# Patient Record
Sex: FEMALE | Race: WHITE | HISPANIC OR LATINO | URBAN - METROPOLITAN AREA
[De-identification: names, ages, dates, MRNs, and addresses within clinical notes are randomized per-mention and may not be internally consistent; named-entity substitution may affect disease eponyms.]

---

## 2017-01-15 ENCOUNTER — EMERGENCY (EMERGENCY)
Facility: HOSPITAL | Age: 32
LOS: 1 days | Discharge: HOME | End: 2017-01-15
Admitting: EMERGENCY MEDICINE
Payer: COMMERCIAL

## 2017-01-15 DIAGNOSIS — Z91.040 LATEX ALLERGY STATUS: ICD-10-CM

## 2017-01-15 DIAGNOSIS — R30.0 DYSURIA: ICD-10-CM

## 2017-01-15 DIAGNOSIS — N39.0 URINARY TRACT INFECTION, SITE NOT SPECIFIED: ICD-10-CM

## 2017-01-15 PROCEDURE — 99283 EMERGENCY DEPT VISIT LOW MDM: CPT

## 2017-01-15 PROCEDURE — 99283 EMERGENCY DEPT VISIT LOW MDM: CPT | Mod: 25

## 2017-01-15 PROCEDURE — 81025 URINE PREGNANCY TEST: CPT

## 2017-01-15 PROCEDURE — 81003 URINALYSIS AUTO W/O SCOPE: CPT

## 2017-01-15 PROCEDURE — 87491 CHLMYD TRACH DNA AMP PROBE: CPT

## 2017-01-15 PROCEDURE — 87591 N.GONORRHOEAE DNA AMP PROB: CPT

## 2017-01-15 PROCEDURE — 87086 URINE CULTURE/COLONY COUNT: CPT

## 2017-05-28 ENCOUNTER — EMERGENCY (EMERGENCY)
Facility: HOSPITAL | Age: 32
LOS: 1 days | Discharge: HOME | End: 2017-05-28
Attending: EMERGENCY MEDICINE | Admitting: EMERGENCY MEDICINE
Payer: COMMERCIAL

## 2017-05-28 VITALS
SYSTOLIC BLOOD PRESSURE: 104 MMHG | OXYGEN SATURATION: 97 % | TEMPERATURE: 99 F | DIASTOLIC BLOOD PRESSURE: 70 MMHG | RESPIRATION RATE: 14 BRPM | HEART RATE: 66 BPM

## 2017-05-28 VITALS
WEIGHT: 104.94 LBS | TEMPERATURE: 98 F | SYSTOLIC BLOOD PRESSURE: 124 MMHG | OXYGEN SATURATION: 98 % | HEIGHT: 66 IN | DIASTOLIC BLOOD PRESSURE: 84 MMHG | HEART RATE: 88 BPM | RESPIRATION RATE: 15 BRPM

## 2017-05-28 DIAGNOSIS — N30.00 ACUTE CYSTITIS WITHOUT HEMATURIA: ICD-10-CM

## 2017-05-28 DIAGNOSIS — Z91.040 LATEX ALLERGY STATUS: ICD-10-CM

## 2017-05-28 DIAGNOSIS — K52.9 NONINFECTIVE GASTROENTERITIS AND COLITIS, UNSPECIFIED: ICD-10-CM

## 2017-05-28 LAB
ALBUMIN SERPL ELPH-MCNC: 3.9 G/DL — SIGNIFICANT CHANGE UP (ref 3.3–5)
ALP SERPL-CCNC: 61 U/L — SIGNIFICANT CHANGE UP (ref 40–120)
ALT FLD-CCNC: 17 U/L — SIGNIFICANT CHANGE UP (ref 12–78)
ANION GAP SERPL CALC-SCNC: 10 MMOL/L — SIGNIFICANT CHANGE UP (ref 5–17)
APPEARANCE UR: CLEAR — SIGNIFICANT CHANGE UP
AST SERPL-CCNC: 20 U/L — SIGNIFICANT CHANGE UP (ref 15–37)
BILIRUB SERPL-MCNC: 0.3 MG/DL — SIGNIFICANT CHANGE UP (ref 0.2–1.2)
BILIRUB UR-MCNC: NEGATIVE — SIGNIFICANT CHANGE UP
BUN SERPL-MCNC: 6 MG/DL — LOW (ref 7–23)
CALCIUM SERPL-MCNC: 8.8 MG/DL — SIGNIFICANT CHANGE UP (ref 8.5–10.1)
CHLORIDE SERPL-SCNC: 109 MMOL/L — HIGH (ref 96–108)
CO2 SERPL-SCNC: 21 MMOL/L — LOW (ref 22–31)
COLOR SPEC: YELLOW — SIGNIFICANT CHANGE UP
CREAT SERPL-MCNC: 0.55 MG/DL — SIGNIFICANT CHANGE UP (ref 0.5–1.3)
DIFF PNL FLD: NEGATIVE — SIGNIFICANT CHANGE UP
GLUCOSE SERPL-MCNC: 116 MG/DL — HIGH (ref 70–99)
GLUCOSE UR QL: NEGATIVE — SIGNIFICANT CHANGE UP
HCG SERPL-ACNC: <1 MIU/ML — SIGNIFICANT CHANGE UP
HCT VFR BLD CALC: 43.3 % — SIGNIFICANT CHANGE UP (ref 34.5–45)
HGB BLD-MCNC: 14.3 G/DL — SIGNIFICANT CHANGE UP (ref 11.5–15.5)
KETONES UR-MCNC: ABNORMAL
LACTATE SERPL-SCNC: 2.9 MMOL/L — HIGH (ref 0.7–2)
LEUKOCYTE ESTERASE UR-ACNC: NEGATIVE — SIGNIFICANT CHANGE UP
LIDOCAIN IGE QN: 135 U/L — SIGNIFICANT CHANGE UP (ref 73–393)
LYMPHOCYTES # BLD AUTO: 20 % — SIGNIFICANT CHANGE UP (ref 13–44)
MCHC RBC-ENTMCNC: 31.3 PG — SIGNIFICANT CHANGE UP (ref 27–34)
MCHC RBC-ENTMCNC: 33 GM/DL — SIGNIFICANT CHANGE UP (ref 32–36)
MCV RBC AUTO: 94.8 FL — SIGNIFICANT CHANGE UP (ref 80–100)
MONOCYTES NFR BLD AUTO: 4 % — SIGNIFICANT CHANGE UP (ref 1–9)
NEUTROPHILS NFR BLD AUTO: 74 % — SIGNIFICANT CHANGE UP (ref 43–77)
NITRITE UR-MCNC: POSITIVE
PH UR: 9 — HIGH (ref 5–8)
PLATELET # BLD AUTO: 302 K/UL — SIGNIFICANT CHANGE UP (ref 150–400)
POTASSIUM SERPL-MCNC: 4.6 MMOL/L — SIGNIFICANT CHANGE UP (ref 3.5–5.3)
POTASSIUM SERPL-SCNC: 4.6 MMOL/L — SIGNIFICANT CHANGE UP (ref 3.5–5.3)
PROT SERPL-MCNC: 7.4 G/DL — SIGNIFICANT CHANGE UP (ref 6–8.3)
PROT UR-MCNC: NEGATIVE — SIGNIFICANT CHANGE UP
RBC # BLD: 4.57 M/UL — SIGNIFICANT CHANGE UP (ref 3.8–5.2)
RBC # FLD: 12.5 % — SIGNIFICANT CHANGE UP (ref 10.3–14.5)
SODIUM SERPL-SCNC: 140 MMOL/L — SIGNIFICANT CHANGE UP (ref 135–145)
SP GR SPEC: 1.01 — SIGNIFICANT CHANGE UP (ref 1.01–1.02)
UROBILINOGEN FLD QL: NEGATIVE — SIGNIFICANT CHANGE UP
WBC # BLD: 8.2 K/UL — SIGNIFICANT CHANGE UP (ref 3.8–10.5)
WBC # FLD AUTO: 8.2 K/UL — SIGNIFICANT CHANGE UP (ref 3.8–10.5)

## 2017-05-28 PROCEDURE — 74177 CT ABD & PELVIS W/CONTRAST: CPT

## 2017-05-28 PROCEDURE — 81001 URINALYSIS AUTO W/SCOPE: CPT

## 2017-05-28 PROCEDURE — 83605 ASSAY OF LACTIC ACID: CPT

## 2017-05-28 PROCEDURE — 96375 TX/PRO/DX INJ NEW DRUG ADDON: CPT

## 2017-05-28 PROCEDURE — 80307 DRUG TEST PRSMV CHEM ANLYZR: CPT

## 2017-05-28 PROCEDURE — 96376 TX/PRO/DX INJ SAME DRUG ADON: CPT

## 2017-05-28 PROCEDURE — 99284 EMERGENCY DEPT VISIT MOD MDM: CPT | Mod: 25

## 2017-05-28 PROCEDURE — 96361 HYDRATE IV INFUSION ADD-ON: CPT

## 2017-05-28 PROCEDURE — 96365 THER/PROPH/DIAG IV INF INIT: CPT

## 2017-05-28 PROCEDURE — 74177 CT ABD & PELVIS W/CONTRAST: CPT | Mod: 26

## 2017-05-28 PROCEDURE — 80053 COMPREHEN METABOLIC PANEL: CPT

## 2017-05-28 PROCEDURE — 84702 CHORIONIC GONADOTROPIN TEST: CPT

## 2017-05-28 PROCEDURE — 83690 ASSAY OF LIPASE: CPT

## 2017-05-28 PROCEDURE — 85027 COMPLETE CBC AUTOMATED: CPT

## 2017-05-28 PROCEDURE — 99285 EMERGENCY DEPT VISIT HI MDM: CPT

## 2017-05-28 RX ORDER — ONDANSETRON 8 MG/1
4 TABLET, FILM COATED ORAL ONCE
Qty: 0 | Refills: 0 | Status: COMPLETED | OUTPATIENT
Start: 2017-05-28 | End: 2017-05-28

## 2017-05-28 RX ORDER — CIPROFLOXACIN LACTATE 400MG/40ML
400 VIAL (ML) INTRAVENOUS EVERY 12 HOURS
Qty: 0 | Refills: 0 | Status: DISCONTINUED | OUTPATIENT
Start: 2017-05-28 | End: 2017-06-01

## 2017-05-28 RX ORDER — OXYCODONE HYDROCHLORIDE 5 MG/1
1 TABLET ORAL
Qty: 20 | Refills: 0 | OUTPATIENT
Start: 2017-05-28 | End: 2017-06-02

## 2017-05-28 RX ORDER — SODIUM CHLORIDE 9 MG/ML
1000 INJECTION INTRAMUSCULAR; INTRAVENOUS; SUBCUTANEOUS ONCE
Qty: 0 | Refills: 0 | Status: COMPLETED | OUTPATIENT
Start: 2017-05-28 | End: 2017-05-28

## 2017-05-28 RX ORDER — MOXIFLOXACIN HYDROCHLORIDE TABLETS, 400 MG 400 MG/1
1 TABLET, FILM COATED ORAL
Qty: 20 | Refills: 0 | OUTPATIENT
Start: 2017-05-28 | End: 2017-06-07

## 2017-05-28 RX ORDER — MORPHINE SULFATE 50 MG/1
4 CAPSULE, EXTENDED RELEASE ORAL ONCE
Qty: 0 | Refills: 0 | Status: DISCONTINUED | OUTPATIENT
Start: 2017-05-28 | End: 2017-05-28

## 2017-05-28 RX ORDER — FAMOTIDINE 10 MG/ML
20 INJECTION INTRAVENOUS ONCE
Qty: 0 | Refills: 0 | Status: COMPLETED | OUTPATIENT
Start: 2017-05-28 | End: 2017-05-28

## 2017-05-28 RX ORDER — METRONIDAZOLE 500 MG
1 TABLET ORAL
Qty: 30 | Refills: 0 | OUTPATIENT
Start: 2017-05-28 | End: 2017-06-07

## 2017-05-28 RX ORDER — METRONIDAZOLE 500 MG
500 TABLET ORAL ONCE
Qty: 0 | Refills: 0 | Status: COMPLETED | OUTPATIENT
Start: 2017-05-28 | End: 2017-05-28

## 2017-05-28 RX ORDER — METOCLOPRAMIDE HCL 10 MG
10 TABLET ORAL ONCE
Qty: 0 | Refills: 0 | Status: COMPLETED | OUTPATIENT
Start: 2017-05-28 | End: 2017-05-28

## 2017-05-28 RX ORDER — KETOROLAC TROMETHAMINE 30 MG/ML
30 SYRINGE (ML) INJECTION ONCE
Qty: 0 | Refills: 0 | Status: DISCONTINUED | OUTPATIENT
Start: 2017-05-28 | End: 2017-05-28

## 2017-05-28 RX ADMIN — Medication 100 MILLIGRAM(S): at 16:21

## 2017-05-28 RX ADMIN — ONDANSETRON 4 MILLIGRAM(S): 8 TABLET, FILM COATED ORAL at 15:39

## 2017-05-28 RX ADMIN — Medication 30 MILLIGRAM(S): at 16:21

## 2017-05-28 RX ADMIN — FAMOTIDINE 20 MILLIGRAM(S): 10 INJECTION INTRAVENOUS at 14:08

## 2017-05-28 RX ADMIN — ONDANSETRON 4 MILLIGRAM(S): 8 TABLET, FILM COATED ORAL at 13:23

## 2017-05-28 RX ADMIN — MORPHINE SULFATE 4 MILLIGRAM(S): 50 CAPSULE, EXTENDED RELEASE ORAL at 15:37

## 2017-05-28 RX ADMIN — SODIUM CHLORIDE 2000 MILLILITER(S): 9 INJECTION INTRAMUSCULAR; INTRAVENOUS; SUBCUTANEOUS at 14:00

## 2017-05-28 RX ADMIN — SODIUM CHLORIDE 2000 MILLILITER(S): 9 INJECTION INTRAMUSCULAR; INTRAVENOUS; SUBCUTANEOUS at 13:23

## 2017-05-28 RX ADMIN — ONDANSETRON 4 MILLIGRAM(S): 8 TABLET, FILM COATED ORAL at 14:32

## 2017-05-28 RX ADMIN — MORPHINE SULFATE 4 MILLIGRAM(S): 50 CAPSULE, EXTENDED RELEASE ORAL at 16:21

## 2017-05-28 RX ADMIN — Medication 200 MILLIGRAM(S): at 16:19

## 2017-05-28 RX ADMIN — Medication 30 MILLIGRAM(S): at 16:19

## 2017-05-28 NOTE — ED PROVIDER NOTE - OBJECTIVE STATEMENT
32 y/o F with hx of gastritis with c/o diffuse abdominal pain associated with nausea dn vomiting since this morning.  pt took Zofran at home with no relief of symptoms.  Pt is a poor historian and uncooperative with exam and history.  pt denies fevers, chills, diarrhea, constipations, recent travel or sick contacts.

## 2017-05-28 NOTE — ED PROVIDER NOTE - CONSTITUTIONAL, MLM
normal... well nourished, awake, alert, oriented to person, place, time/situation and in moderate apparent distress.

## 2017-05-28 NOTE — ED PROVIDER NOTE - MEDICAL DECISION MAKING DETAILS
pt with hx of gastritis with diffuse abd tenderness, burning and vomiting.  labs, UA, IVFs, pain control

## 2017-07-09 ENCOUNTER — EMERGENCY (EMERGENCY)
Facility: HOSPITAL | Age: 32
LOS: 1 days | Discharge: HOME | End: 2017-07-09
Attending: EMERGENCY MEDICINE | Admitting: EMERGENCY MEDICINE
Payer: COMMERCIAL

## 2017-07-09 VITALS
HEART RATE: 78 BPM | SYSTOLIC BLOOD PRESSURE: 102 MMHG | RESPIRATION RATE: 14 BRPM | DIASTOLIC BLOOD PRESSURE: 60 MMHG | TEMPERATURE: 98 F | OXYGEN SATURATION: 98 %

## 2017-07-09 VITALS
HEIGHT: 66 IN | TEMPERATURE: 98 F | SYSTOLIC BLOOD PRESSURE: 91 MMHG | WEIGHT: 110.01 LBS | DIASTOLIC BLOOD PRESSURE: 66 MMHG | OXYGEN SATURATION: 100 % | RESPIRATION RATE: 15 BRPM | HEART RATE: 81 BPM

## 2017-07-09 DIAGNOSIS — Z98.891 HISTORY OF UTERINE SCAR FROM PREVIOUS SURGERY: Chronic | ICD-10-CM

## 2017-07-09 DIAGNOSIS — Z87.442 PERSONAL HISTORY OF URINARY CALCULI: ICD-10-CM

## 2017-07-09 DIAGNOSIS — R11.2 NAUSEA WITH VOMITING, UNSPECIFIED: ICD-10-CM

## 2017-07-09 DIAGNOSIS — Z91.040 LATEX ALLERGY STATUS: ICD-10-CM

## 2017-07-09 DIAGNOSIS — Z98.890 OTHER SPECIFIED POSTPROCEDURAL STATES: ICD-10-CM

## 2017-07-09 DIAGNOSIS — N94.6 DYSMENORRHEA, UNSPECIFIED: ICD-10-CM

## 2017-07-09 LAB
ALBUMIN SERPL ELPH-MCNC: 4.1 G/DL — SIGNIFICANT CHANGE UP (ref 3.3–5)
ALP SERPL-CCNC: 74 U/L — SIGNIFICANT CHANGE UP (ref 40–120)
ALT FLD-CCNC: 19 U/L — SIGNIFICANT CHANGE UP (ref 12–78)
AMYLASE P1 CFR SERPL: 114 U/L — SIGNIFICANT CHANGE UP (ref 25–115)
ANION GAP SERPL CALC-SCNC: 5 MMOL/L — SIGNIFICANT CHANGE UP (ref 5–17)
APPEARANCE UR: ABNORMAL
AST SERPL-CCNC: 16 U/L — SIGNIFICANT CHANGE UP (ref 15–37)
BILIRUB SERPL-MCNC: 0.4 MG/DL — SIGNIFICANT CHANGE UP (ref 0.2–1.2)
BILIRUB UR-MCNC: NEGATIVE — SIGNIFICANT CHANGE UP
BUN SERPL-MCNC: 7 MG/DL — SIGNIFICANT CHANGE UP (ref 7–23)
CALCIUM SERPL-MCNC: 9 MG/DL — SIGNIFICANT CHANGE UP (ref 8.5–10.1)
CHLORIDE SERPL-SCNC: 109 MMOL/L — HIGH (ref 96–108)
CO2 SERPL-SCNC: 29 MMOL/L — SIGNIFICANT CHANGE UP (ref 22–31)
COLOR SPEC: YELLOW — SIGNIFICANT CHANGE UP
CREAT SERPL-MCNC: 0.66 MG/DL — SIGNIFICANT CHANGE UP (ref 0.5–1.3)
DIFF PNL FLD: ABNORMAL
GLUCOSE SERPL-MCNC: 92 MG/DL — SIGNIFICANT CHANGE UP (ref 70–99)
GLUCOSE UR QL: NEGATIVE — SIGNIFICANT CHANGE UP
HCT VFR BLD CALC: 44.9 % — SIGNIFICANT CHANGE UP (ref 34.5–45)
HGB BLD-MCNC: 14.6 G/DL — SIGNIFICANT CHANGE UP (ref 11.5–15.5)
KETONES UR-MCNC: NEGATIVE — SIGNIFICANT CHANGE UP
LEUKOCYTE ESTERASE UR-ACNC: NEGATIVE — SIGNIFICANT CHANGE UP
LIDOCAIN IGE QN: 163 U/L — SIGNIFICANT CHANGE UP (ref 73–393)
MCHC RBC-ENTMCNC: 31.2 PG — SIGNIFICANT CHANGE UP (ref 27–34)
MCHC RBC-ENTMCNC: 32.6 GM/DL — SIGNIFICANT CHANGE UP (ref 32–36)
MCV RBC AUTO: 95.7 FL — SIGNIFICANT CHANGE UP (ref 80–100)
NITRITE UR-MCNC: NEGATIVE — SIGNIFICANT CHANGE UP
PH UR: 7 — SIGNIFICANT CHANGE UP (ref 5–8)
PLATELET # BLD AUTO: 404 K/UL — HIGH (ref 150–400)
POTASSIUM SERPL-MCNC: 4.2 MMOL/L — SIGNIFICANT CHANGE UP (ref 3.5–5.3)
POTASSIUM SERPL-SCNC: 4.2 MMOL/L — SIGNIFICANT CHANGE UP (ref 3.5–5.3)
PROT SERPL-MCNC: 8 G/DL — SIGNIFICANT CHANGE UP (ref 6–8.3)
PROT UR-MCNC: NEGATIVE — SIGNIFICANT CHANGE UP
RBC # BLD: 4.69 M/UL — SIGNIFICANT CHANGE UP (ref 3.8–5.2)
RBC # FLD: 12.7 % — SIGNIFICANT CHANGE UP (ref 10.3–14.5)
SODIUM SERPL-SCNC: 143 MMOL/L — SIGNIFICANT CHANGE UP (ref 135–145)
SP GR SPEC: 1 — LOW (ref 1.01–1.02)
UROBILINOGEN FLD QL: NEGATIVE — SIGNIFICANT CHANGE UP
WBC # BLD: 13.8 K/UL — HIGH (ref 3.8–10.5)
WBC # FLD AUTO: 13.8 K/UL — HIGH (ref 3.8–10.5)

## 2017-07-09 PROCEDURE — 80053 COMPREHEN METABOLIC PANEL: CPT

## 2017-07-09 PROCEDURE — 96374 THER/PROPH/DIAG INJ IV PUSH: CPT

## 2017-07-09 PROCEDURE — 99284 EMERGENCY DEPT VISIT MOD MDM: CPT | Mod: 25

## 2017-07-09 PROCEDURE — 83690 ASSAY OF LIPASE: CPT

## 2017-07-09 PROCEDURE — 84702 CHORIONIC GONADOTROPIN TEST: CPT

## 2017-07-09 PROCEDURE — 85027 COMPLETE CBC AUTOMATED: CPT

## 2017-07-09 PROCEDURE — 82150 ASSAY OF AMYLASE: CPT

## 2017-07-09 PROCEDURE — 99285 EMERGENCY DEPT VISIT HI MDM: CPT

## 2017-07-09 PROCEDURE — 96375 TX/PRO/DX INJ NEW DRUG ADDON: CPT

## 2017-07-09 PROCEDURE — 81001 URINALYSIS AUTO W/SCOPE: CPT

## 2017-07-09 PROCEDURE — 87086 URINE CULTURE/COLONY COUNT: CPT

## 2017-07-09 RX ORDER — KETOROLAC TROMETHAMINE 30 MG/ML
30 SYRINGE (ML) INJECTION ONCE
Qty: 0 | Refills: 0 | Status: DISCONTINUED | OUTPATIENT
Start: 2017-07-09 | End: 2017-07-09

## 2017-07-09 RX ORDER — ONDANSETRON 8 MG/1
4 TABLET, FILM COATED ORAL ONCE
Qty: 0 | Refills: 0 | Status: COMPLETED | OUTPATIENT
Start: 2017-07-09 | End: 2017-07-09

## 2017-07-09 RX ORDER — SODIUM CHLORIDE 9 MG/ML
1000 INJECTION INTRAMUSCULAR; INTRAVENOUS; SUBCUTANEOUS ONCE
Qty: 0 | Refills: 0 | Status: COMPLETED | OUTPATIENT
Start: 2017-07-09 | End: 2017-07-09

## 2017-07-09 RX ORDER — FAMOTIDINE 10 MG/ML
20 INJECTION INTRAVENOUS ONCE
Qty: 0 | Refills: 0 | Status: COMPLETED | OUTPATIENT
Start: 2017-07-09 | End: 2017-07-09

## 2017-07-09 RX ADMIN — Medication 30 MILLIGRAM(S): at 16:08

## 2017-07-09 RX ADMIN — Medication 30 MILLIGRAM(S): at 16:27

## 2017-07-09 RX ADMIN — ONDANSETRON 4 MILLIGRAM(S): 8 TABLET, FILM COATED ORAL at 14:19

## 2017-07-09 RX ADMIN — FAMOTIDINE 20 MILLIGRAM(S): 10 INJECTION INTRAVENOUS at 14:18

## 2017-07-09 RX ADMIN — SODIUM CHLORIDE 2000 MILLILITER(S): 9 INJECTION INTRAMUSCULAR; INTRAVENOUS; SUBCUTANEOUS at 14:19

## 2017-07-09 NOTE — ED PROVIDER NOTE - CARE PLAN
Principal Discharge DX:	Non-intractable vomiting with nausea, unspecified vomiting type  Secondary Diagnosis:	Menses painful

## 2017-07-09 NOTE — ED ADULT NURSE NOTE - NS ED NURSE DC INFO COMPLEXITY
Patient asked questions/Returned Demonstration/Simple: Patient demonstrates quick and easy understanding/Verbalized Understanding

## 2017-07-09 NOTE — ED PROVIDER NOTE - OBJECTIVE STATEMENT
30 yo female with few hours of nausea and vomiting associated with weakness now here for evaluation. Menses began today with abdominal cramps.

## 2017-07-09 NOTE — ED PROVIDER NOTE - CONSTITUTIONAL, MLM
normal... Thin female, well nourished, awake, alert, oriented to person, place, time/situation and in mild apparent distress.

## 2017-07-10 LAB
CULTURE RESULTS: SIGNIFICANT CHANGE UP
SPECIMEN SOURCE: SIGNIFICANT CHANGE UP

## 2017-08-23 ENCOUNTER — EMERGENCY (EMERGENCY)
Facility: HOSPITAL | Age: 32
LOS: 1 days | Discharge: HOME | End: 2017-08-23
Admitting: EMERGENCY MEDICINE
Payer: COMMERCIAL

## 2017-08-23 DIAGNOSIS — Z91.040 LATEX ALLERGY STATUS: ICD-10-CM

## 2017-08-23 DIAGNOSIS — S70.11XA CONTUSION OF RIGHT THIGH, INITIAL ENCOUNTER: ICD-10-CM
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
## 2017-08-23 DIAGNOSIS — Z98.891 HISTORY OF UTERINE SCAR FROM PREVIOUS SURGERY: Chronic | ICD-10-CM
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
## 2017-08-23 DIAGNOSIS — Z79.899 OTHER LONG TERM (CURRENT) DRUG THERAPY: ICD-10-CM

## 2017-08-23 DIAGNOSIS — M79.1 MYALGIA: ICD-10-CM

## 2017-08-23 PROCEDURE — 99284 EMERGENCY DEPT VISIT MOD MDM: CPT

## 2017-08-24 PROCEDURE — 81025 URINE PREGNANCY TEST: CPT

## 2017-08-24 PROCEDURE — 99283 EMERGENCY DEPT VISIT LOW MDM: CPT | Mod: 25

## 2017-08-24 PROCEDURE — 96372 THER/PROPH/DIAG INJ SC/IM: CPT

## 2017-09-21 RX ORDER — ONDANSETRON 8 MG/1
1 TABLET, FILM COATED ORAL
Qty: 0 | Refills: 0 | COMMUNITY

## 2017-11-07 ENCOUNTER — EMERGENCY (EMERGENCY)
Facility: HOSPITAL | Age: 32
LOS: 1 days | End: 2017-11-07
Admitting: EMERGENCY MEDICINE
Payer: COMMERCIAL

## 2017-11-07 DIAGNOSIS — R11.2 NAUSEA WITH VOMITING, UNSPECIFIED: ICD-10-CM

## 2017-11-07 DIAGNOSIS — Z98.891 HISTORY OF UTERINE SCAR FROM PREVIOUS SURGERY: Chronic | ICD-10-CM

## 2017-11-07 DIAGNOSIS — Z98.890 OTHER SPECIFIED POSTPROCEDURAL STATES: ICD-10-CM

## 2017-11-07 DIAGNOSIS — F17.200 NICOTINE DEPENDENCE, UNSPECIFIED, UNCOMPLICATED: ICD-10-CM

## 2017-11-07 DIAGNOSIS — Z91.040 LATEX ALLERGY STATUS: ICD-10-CM

## 2017-11-07 DIAGNOSIS — Z79.899 OTHER LONG TERM (CURRENT) DRUG THERAPY: ICD-10-CM

## 2017-11-07 DIAGNOSIS — R10.9 UNSPECIFIED ABDOMINAL PAIN: ICD-10-CM

## 2017-11-07 PROCEDURE — 96375 TX/PRO/DX INJ NEW DRUG ADDON: CPT

## 2017-11-07 PROCEDURE — 81002 URINALYSIS NONAUTO W/O SCOPE: CPT

## 2017-11-07 PROCEDURE — 99284 EMERGENCY DEPT VISIT MOD MDM: CPT | Mod: 25

## 2017-11-07 PROCEDURE — 99284 EMERGENCY DEPT VISIT MOD MDM: CPT

## 2017-11-07 PROCEDURE — 96361 HYDRATE IV INFUSION ADD-ON: CPT

## 2017-11-07 PROCEDURE — 81025 URINE PREGNANCY TEST: CPT

## 2017-11-07 PROCEDURE — 96374 THER/PROPH/DIAG INJ IV PUSH: CPT

## 2017-11-28 ENCOUNTER — EMERGENCY (EMERGENCY)
Facility: HOSPITAL | Age: 32
LOS: 1 days | Discharge: HOME | End: 2017-11-28
Admitting: EMERGENCY MEDICINE
Payer: COMMERCIAL

## 2017-11-28 DIAGNOSIS — Z98.891 HISTORY OF UTERINE SCAR FROM PREVIOUS SURGERY: Chronic | ICD-10-CM

## 2017-11-28 DIAGNOSIS — Z79.899 OTHER LONG TERM (CURRENT) DRUG THERAPY: ICD-10-CM

## 2017-11-28 DIAGNOSIS — R10.84 GENERALIZED ABDOMINAL PAIN: ICD-10-CM

## 2017-11-28 DIAGNOSIS — M54.9 DORSALGIA, UNSPECIFIED: ICD-10-CM

## 2017-11-28 DIAGNOSIS — Z91.040 LATEX ALLERGY STATUS: ICD-10-CM

## 2017-11-28 DIAGNOSIS — R30.0 DYSURIA: ICD-10-CM

## 2017-11-28 PROCEDURE — 85027 COMPLETE CBC AUTOMATED: CPT

## 2017-11-28 PROCEDURE — 80053 COMPREHEN METABOLIC PANEL: CPT

## 2017-11-28 PROCEDURE — 81002 URINALYSIS NONAUTO W/O SCOPE: CPT

## 2017-11-28 PROCEDURE — 96374 THER/PROPH/DIAG INJ IV PUSH: CPT

## 2017-11-28 PROCEDURE — 81025 URINE PREGNANCY TEST: CPT

## 2017-11-28 PROCEDURE — 96361 HYDRATE IV INFUSION ADD-ON: CPT

## 2017-11-28 PROCEDURE — 96375 TX/PRO/DX INJ NEW DRUG ADDON: CPT

## 2017-11-28 PROCEDURE — 99284 EMERGENCY DEPT VISIT MOD MDM: CPT | Mod: 25

## 2017-11-28 PROCEDURE — 83690 ASSAY OF LIPASE: CPT

## 2017-11-28 PROCEDURE — 99284 EMERGENCY DEPT VISIT MOD MDM: CPT

## 2017-11-28 PROCEDURE — 81003 URINALYSIS AUTO W/O SCOPE: CPT

## 2018-01-04 ENCOUNTER — APPOINTMENT (OUTPATIENT)
Dept: PAIN MANAGEMENT | Facility: CLINIC | Age: 33
End: 2018-01-04

## 2018-01-28 RX ADMIN — Medication 30 MILLIGRAM(S): at 21:56

## 2018-01-31 ENCOUNTER — EMERGENCY (EMERGENCY)
Facility: HOSPITAL | Age: 33
LOS: 1 days | Discharge: HOME | End: 2018-01-31
Attending: EMERGENCY MEDICINE | Admitting: EMERGENCY MEDICINE
Payer: COMMERCIAL

## 2018-01-31 VITALS
TEMPERATURE: 98 F | HEART RATE: 78 BPM | DIASTOLIC BLOOD PRESSURE: 67 MMHG | OXYGEN SATURATION: 98 % | RESPIRATION RATE: 12 BRPM | WEIGHT: 110.01 LBS | SYSTOLIC BLOOD PRESSURE: 117 MMHG | HEIGHT: 66 IN

## 2018-01-31 DIAGNOSIS — Z98.891 HISTORY OF UTERINE SCAR FROM PREVIOUS SURGERY: Chronic | ICD-10-CM

## 2018-01-31 LAB
ALBUMIN SERPL ELPH-MCNC: 3.6 G/DL — SIGNIFICANT CHANGE UP (ref 3.3–5)
ALP SERPL-CCNC: 68 U/L — SIGNIFICANT CHANGE UP (ref 40–120)
ALT FLD-CCNC: 15 U/L — SIGNIFICANT CHANGE UP (ref 12–78)
AMYLASE P1 CFR SERPL: 98 U/L — SIGNIFICANT CHANGE UP (ref 25–115)
ANION GAP SERPL CALC-SCNC: 6 MMOL/L — SIGNIFICANT CHANGE UP (ref 5–17)
APPEARANCE UR: CLEAR — SIGNIFICANT CHANGE UP
AST SERPL-CCNC: 15 U/L — SIGNIFICANT CHANGE UP (ref 15–37)
BASOPHILS # BLD AUTO: 0.1 K/UL — SIGNIFICANT CHANGE UP (ref 0–0.2)
BASOPHILS NFR BLD AUTO: 0.9 % — SIGNIFICANT CHANGE UP (ref 0–2)
BILIRUB SERPL-MCNC: 0.4 MG/DL — SIGNIFICANT CHANGE UP (ref 0.2–1.2)
BILIRUB UR-MCNC: ABNORMAL
BUN SERPL-MCNC: 16 MG/DL — SIGNIFICANT CHANGE UP (ref 7–23)
CALCIUM SERPL-MCNC: 8.4 MG/DL — LOW (ref 8.5–10.1)
CHLORIDE SERPL-SCNC: 107 MMOL/L — SIGNIFICANT CHANGE UP (ref 96–108)
CO2 SERPL-SCNC: 28 MMOL/L — SIGNIFICANT CHANGE UP (ref 22–31)
COLOR SPEC: ABNORMAL
CREAT SERPL-MCNC: 0.62 MG/DL — SIGNIFICANT CHANGE UP (ref 0.5–1.3)
DIFF PNL FLD: NEGATIVE — SIGNIFICANT CHANGE UP
EOSINOPHIL # BLD AUTO: 0.1 K/UL — SIGNIFICANT CHANGE UP (ref 0–0.5)
EOSINOPHIL NFR BLD AUTO: 0.7 % — SIGNIFICANT CHANGE UP (ref 0–6)
GLUCOSE SERPL-MCNC: 84 MG/DL — SIGNIFICANT CHANGE UP (ref 70–99)
GLUCOSE UR QL: NEGATIVE — SIGNIFICANT CHANGE UP
HCG SERPL-ACNC: <1 MIU/ML — SIGNIFICANT CHANGE UP
HCT VFR BLD CALC: 37.9 % — SIGNIFICANT CHANGE UP (ref 34.5–45)
HGB BLD-MCNC: 12.4 G/DL — SIGNIFICANT CHANGE UP (ref 11.5–15.5)
KETONES UR-MCNC: ABNORMAL
LEUKOCYTE ESTERASE UR-ACNC: ABNORMAL
LIDOCAIN IGE QN: 206 U/L — SIGNIFICANT CHANGE UP (ref 73–393)
LYMPHOCYTES # BLD AUTO: 3.4 K/UL — HIGH (ref 1–3.3)
LYMPHOCYTES # BLD AUTO: 35.6 % — SIGNIFICANT CHANGE UP (ref 13–44)
MCHC RBC-ENTMCNC: 30.1 PG — SIGNIFICANT CHANGE UP (ref 27–34)
MCHC RBC-ENTMCNC: 32.8 GM/DL — SIGNIFICANT CHANGE UP (ref 32–36)
MCV RBC AUTO: 91.9 FL — SIGNIFICANT CHANGE UP (ref 80–100)
MONOCYTES # BLD AUTO: 0.8 K/UL — SIGNIFICANT CHANGE UP (ref 0–0.9)
MONOCYTES NFR BLD AUTO: 8.3 % — SIGNIFICANT CHANGE UP (ref 1–9)
NEUTROPHILS # BLD AUTO: 5.2 K/UL — SIGNIFICANT CHANGE UP (ref 1.8–7.4)
NEUTROPHILS NFR BLD AUTO: 54.5 % — SIGNIFICANT CHANGE UP (ref 43–77)
NITRITE UR-MCNC: POSITIVE
PH UR: 7 — SIGNIFICANT CHANGE UP (ref 5–8)
PLATELET # BLD AUTO: 346 K/UL — SIGNIFICANT CHANGE UP (ref 150–400)
POTASSIUM SERPL-MCNC: 4.1 MMOL/L — SIGNIFICANT CHANGE UP (ref 3.5–5.3)
POTASSIUM SERPL-SCNC: 4.1 MMOL/L — SIGNIFICANT CHANGE UP (ref 3.5–5.3)
PROT SERPL-MCNC: 7 G/DL — SIGNIFICANT CHANGE UP (ref 6–8.3)
PROT UR-MCNC: NEGATIVE — SIGNIFICANT CHANGE UP
RBC # BLD: 4.13 M/UL — SIGNIFICANT CHANGE UP (ref 3.8–5.2)
RBC # FLD: 12.2 % — SIGNIFICANT CHANGE UP (ref 10.3–14.5)
SODIUM SERPL-SCNC: 141 MMOL/L — SIGNIFICANT CHANGE UP (ref 135–145)
SP GR SPEC: 1.01 — SIGNIFICANT CHANGE UP (ref 1.01–1.02)
UROBILINOGEN FLD QL: 12
WBC # BLD: 9.6 K/UL — SIGNIFICANT CHANGE UP (ref 3.8–10.5)
WBC # FLD AUTO: 9.6 K/UL — SIGNIFICANT CHANGE UP (ref 3.8–10.5)

## 2018-01-31 PROCEDURE — 74176 CT ABD & PELVIS W/O CONTRAST: CPT | Mod: 26

## 2018-01-31 PROCEDURE — 99284 EMERGENCY DEPT VISIT MOD MDM: CPT

## 2018-01-31 RX ORDER — SODIUM CHLORIDE 9 MG/ML
1000 INJECTION INTRAMUSCULAR; INTRAVENOUS; SUBCUTANEOUS ONCE
Qty: 0 | Refills: 0 | Status: COMPLETED | OUTPATIENT
Start: 2018-01-31 | End: 2018-01-31

## 2018-01-31 RX ORDER — SODIUM CHLORIDE 9 MG/ML
3 INJECTION INTRAMUSCULAR; INTRAVENOUS; SUBCUTANEOUS ONCE
Qty: 0 | Refills: 0 | Status: COMPLETED | OUTPATIENT
Start: 2018-01-31 | End: 2018-01-31

## 2018-01-31 RX ORDER — KETOROLAC TROMETHAMINE 30 MG/ML
30 SYRINGE (ML) INJECTION ONCE
Qty: 0 | Refills: 0 | Status: DISCONTINUED | OUTPATIENT
Start: 2018-01-31 | End: 2018-01-31

## 2018-01-31 RX ORDER — OXYCODONE AND ACETAMINOPHEN 5; 325 MG/1; MG/1
1 TABLET ORAL ONCE
Qty: 0 | Refills: 0 | Status: DISCONTINUED | OUTPATIENT
Start: 2018-01-31 | End: 2018-01-31

## 2018-01-31 RX ORDER — ONDANSETRON 8 MG/1
4 TABLET, FILM COATED ORAL ONCE
Qty: 0 | Refills: 0 | Status: COMPLETED | OUTPATIENT
Start: 2018-01-31 | End: 2018-01-31

## 2018-01-31 RX ADMIN — SODIUM CHLORIDE 1000 MILLILITER(S): 9 INJECTION INTRAMUSCULAR; INTRAVENOUS; SUBCUTANEOUS at 21:25

## 2018-01-31 RX ADMIN — SODIUM CHLORIDE 3 MILLILITER(S): 9 INJECTION INTRAMUSCULAR; INTRAVENOUS; SUBCUTANEOUS at 20:02

## 2018-01-31 RX ADMIN — SODIUM CHLORIDE 1000 MILLILITER(S): 9 INJECTION INTRAMUSCULAR; INTRAVENOUS; SUBCUTANEOUS at 20:05

## 2018-01-31 RX ADMIN — ONDANSETRON 4 MILLIGRAM(S): 8 TABLET, FILM COATED ORAL at 20:08

## 2018-01-31 RX ADMIN — Medication 30 MILLIGRAM(S): at 21:26

## 2018-01-31 NOTE — ED PROVIDER NOTE - PLAN OF CARE
Return to the ED for any new or worsening symptoms  Take your medication as prescribed  Ceftin 1 tab 2 times a day you had your first dose here   Motrin per label instructions as needed for pain   Pyridium per label instructions as needed for pain   Zofran per label instructions as needed for nausea   Advance activity as tolerated   Follow up with your PMD in 2-3 days for a recheck

## 2018-01-31 NOTE — ED PROVIDER NOTE - OBJECTIVE STATEMENT
Pt is a 33 yo female who presents to the ED with cc of abdominal pain, dysuria.  PMHx of gastritis, h/o kidney stone did not require surgical intervention.  Pt reports the for the last several days she has been experiencing diffuse lower abdominal pain with radiation to her back.  She further reports subjective fevers, chills, nausea, dysuria, frequency, and urgency.  Denies V/D/C, CP, SOB, ext numbness or weakness.  Pt has taken Pyridium OTC with little to no relief.  She has not followed up with her PMD regarding these symptoms.  LMP 1 1/2 weeks ago

## 2018-01-31 NOTE — ED ADULT TRIAGE NOTE - CHIEF COMPLAINT QUOTE
patient with complain of lower abdomen and lower back pains, chills, nausea, frequency and burning with urination, hematuria

## 2018-01-31 NOTE — ED PROVIDER NOTE - CARE PLAN
Principal Discharge DX:	UTI (urinary tract infection)  Assessment and plan of treatment:	Return to the ED for any new or worsening symptoms  Take your medication as prescribed  Ceftin 1 tab 2 times a day you had your first dose here   Motrin per label instructions as needed for pain   Pyridium per label instructions as needed for pain   Zofran per label instructions as needed for nausea   Advance activity as tolerated   Follow up with your PMD in 2-3 days for a recheck  Secondary Diagnosis:	Abdominal pain  Secondary Diagnosis:	Nausea

## 2018-02-01 VITALS
DIASTOLIC BLOOD PRESSURE: 66 MMHG | HEART RATE: 65 BPM | OXYGEN SATURATION: 100 % | TEMPERATURE: 98 F | RESPIRATION RATE: 15 BRPM | SYSTOLIC BLOOD PRESSURE: 100 MMHG

## 2018-02-01 LAB
CULTURE RESULTS: SIGNIFICANT CHANGE UP
SPECIMEN SOURCE: SIGNIFICANT CHANGE UP

## 2018-02-01 PROCEDURE — 81001 URINALYSIS AUTO W/SCOPE: CPT

## 2018-02-01 PROCEDURE — 80053 COMPREHEN METABOLIC PANEL: CPT

## 2018-02-01 PROCEDURE — 99284 EMERGENCY DEPT VISIT MOD MDM: CPT | Mod: 25

## 2018-02-01 PROCEDURE — 74176 CT ABD & PELVIS W/O CONTRAST: CPT

## 2018-02-01 PROCEDURE — 83690 ASSAY OF LIPASE: CPT

## 2018-02-01 PROCEDURE — 84702 CHORIONIC GONADOTROPIN TEST: CPT

## 2018-02-01 PROCEDURE — 87086 URINE CULTURE/COLONY COUNT: CPT

## 2018-02-01 PROCEDURE — 96374 THER/PROPH/DIAG INJ IV PUSH: CPT

## 2018-02-01 PROCEDURE — 96375 TX/PRO/DX INJ NEW DRUG ADDON: CPT

## 2018-02-01 PROCEDURE — 85027 COMPLETE CBC AUTOMATED: CPT

## 2018-02-01 PROCEDURE — 82150 ASSAY OF AMYLASE: CPT

## 2018-02-01 RX ORDER — CEFUROXIME AXETIL 250 MG
500 TABLET ORAL ONCE
Qty: 0 | Refills: 0 | Status: COMPLETED | OUTPATIENT
Start: 2018-02-01 | End: 2018-02-01

## 2018-02-01 RX ORDER — PHENAZOPYRIDINE HCL 100 MG
1 TABLET ORAL
Qty: 6 | Refills: 0
Start: 2018-02-01 | End: 2018-02-02

## 2018-02-01 RX ORDER — IBUPROFEN 200 MG
1 TABLET ORAL
Qty: 24 | Refills: 0
Start: 2018-02-01 | End: 2018-02-06

## 2018-02-01 RX ORDER — CEFUROXIME AXETIL 250 MG
1 TABLET ORAL
Qty: 14 | Refills: 0
Start: 2018-02-01 | End: 2018-02-07

## 2018-02-01 RX ORDER — ONDANSETRON 8 MG/1
1 TABLET, FILM COATED ORAL
Qty: 12 | Refills: 0
Start: 2018-02-01 | End: 2018-02-04

## 2018-02-01 RX ADMIN — Medication 500 MILLIGRAM(S): at 00:32

## 2018-02-01 RX ADMIN — OXYCODONE AND ACETAMINOPHEN 1 TABLET(S): 5; 325 TABLET ORAL at 00:40

## 2018-02-01 RX ADMIN — OXYCODONE AND ACETAMINOPHEN 1 TABLET(S): 5; 325 TABLET ORAL at 00:10

## 2018-05-09 ENCOUNTER — EMERGENCY (EMERGENCY)
Facility: HOSPITAL | Age: 33
LOS: 1 days | Discharge: HOME | End: 2018-05-09
Admitting: EMERGENCY MEDICINE
Payer: COMMERCIAL

## 2018-05-09 DIAGNOSIS — Z87.09 PERSONAL HISTORY OF OTHER DISEASES OF THE RESPIRATORY SYSTEM: ICD-10-CM

## 2018-05-09 DIAGNOSIS — Z79.899 OTHER LONG TERM (CURRENT) DRUG THERAPY: ICD-10-CM

## 2018-05-09 DIAGNOSIS — Z87.19 PERSONAL HISTORY OF OTHER DISEASES OF THE DIGESTIVE SYSTEM: ICD-10-CM

## 2018-05-09 DIAGNOSIS — Z98.891 HISTORY OF UTERINE SCAR FROM PREVIOUS SURGERY: Chronic | ICD-10-CM

## 2018-05-09 DIAGNOSIS — R53.81 OTHER MALAISE: ICD-10-CM

## 2018-05-09 DIAGNOSIS — R51 HEADACHE: ICD-10-CM

## 2018-05-09 DIAGNOSIS — R30.0 DYSURIA: ICD-10-CM

## 2018-05-09 DIAGNOSIS — Z91.040 LATEX ALLERGY STATUS: ICD-10-CM

## 2018-05-09 DIAGNOSIS — R10.9 UNSPECIFIED ABDOMINAL PAIN: ICD-10-CM

## 2018-05-09 PROCEDURE — 99284 EMERGENCY DEPT VISIT MOD MDM: CPT

## 2018-05-10 PROCEDURE — 96374 THER/PROPH/DIAG INJ IV PUSH: CPT

## 2018-05-10 PROCEDURE — 81003 URINALYSIS AUTO W/O SCOPE: CPT

## 2018-05-10 PROCEDURE — 87086 URINE CULTURE/COLONY COUNT: CPT

## 2018-05-10 PROCEDURE — 99284 EMERGENCY DEPT VISIT MOD MDM: CPT | Mod: 25

## 2018-05-10 PROCEDURE — 85610 PROTHROMBIN TIME: CPT

## 2018-05-10 PROCEDURE — 80053 COMPREHEN METABOLIC PANEL: CPT

## 2018-05-10 PROCEDURE — 81025 URINE PREGNANCY TEST: CPT

## 2018-05-10 PROCEDURE — 85730 THROMBOPLASTIN TIME PARTIAL: CPT

## 2018-05-10 PROCEDURE — 85027 COMPLETE CBC AUTOMATED: CPT

## 2018-06-01 ENCOUNTER — EMERGENCY (EMERGENCY)
Facility: HOSPITAL | Age: 33
LOS: 1 days | Discharge: HOME | End: 2018-06-01
Admitting: INTERNAL MEDICINE
Payer: COMMERCIAL

## 2018-06-01 DIAGNOSIS — M54.2 CERVICALGIA: ICD-10-CM

## 2018-06-01 DIAGNOSIS — Z91.040 LATEX ALLERGY STATUS: ICD-10-CM
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
## 2018-06-01 DIAGNOSIS — Z98.891 HISTORY OF UTERINE SCAR FROM PREVIOUS SURGERY: Chronic | ICD-10-CM
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
## 2018-06-01 DIAGNOSIS — S16.1XXA STRAIN OF MUSCLE, FASCIA AND TENDON AT NECK LEVEL, INITIAL ENCOUNTER: ICD-10-CM

## 2018-06-01 DIAGNOSIS — Z79.899 OTHER LONG TERM (CURRENT) DRUG THERAPY: ICD-10-CM

## 2018-06-01 PROCEDURE — 99284 EMERGENCY DEPT VISIT MOD MDM: CPT | Mod: 25

## 2018-06-01 PROCEDURE — 72040 X-RAY EXAM NECK SPINE 2-3 VW: CPT

## 2018-06-01 PROCEDURE — 99283 EMERGENCY DEPT VISIT LOW MDM: CPT

## 2018-06-01 PROCEDURE — 81025 URINE PREGNANCY TEST: CPT

## 2018-06-01 PROCEDURE — 96372 THER/PROPH/DIAG INJ SC/IM: CPT

## 2018-06-01 PROCEDURE — 72040 X-RAY EXAM NECK SPINE 2-3 VW: CPT | Mod: 26

## 2018-07-31 ENCOUNTER — EMERGENCY (EMERGENCY)
Facility: HOSPITAL | Age: 33
LOS: 1 days | Discharge: HOME | End: 2018-07-31
Attending: EMERGENCY MEDICINE | Admitting: EMERGENCY MEDICINE
Payer: MEDICAID

## 2018-07-31 VITALS
OXYGEN SATURATION: 97 % | DIASTOLIC BLOOD PRESSURE: 71 MMHG | SYSTOLIC BLOOD PRESSURE: 102 MMHG | HEIGHT: 66 IN | WEIGHT: 110.89 LBS | RESPIRATION RATE: 17 BRPM | TEMPERATURE: 98 F | HEART RATE: 78 BPM

## 2018-07-31 VITALS
OXYGEN SATURATION: 98 % | SYSTOLIC BLOOD PRESSURE: 98 MMHG | DIASTOLIC BLOOD PRESSURE: 56 MMHG | RESPIRATION RATE: 18 BRPM | HEART RATE: 73 BPM

## 2018-07-31 DIAGNOSIS — Z98.891 HISTORY OF UTERINE SCAR FROM PREVIOUS SURGERY: Chronic | ICD-10-CM

## 2018-07-31 PROBLEM — K29.70 GASTRITIS, UNSPECIFIED, WITHOUT BLEEDING: Chronic | Status: ACTIVE | Noted: 2017-05-28

## 2018-07-31 PROBLEM — N20.0 CALCULUS OF KIDNEY: Chronic | Status: ACTIVE | Noted: 2017-07-09

## 2018-07-31 LAB
ALBUMIN SERPL ELPH-MCNC: 3.5 G/DL — SIGNIFICANT CHANGE UP (ref 3.3–5)
ALP SERPL-CCNC: 62 U/L — SIGNIFICANT CHANGE UP (ref 40–120)
ALT FLD-CCNC: 20 U/L DA — SIGNIFICANT CHANGE UP (ref 10–45)
ANION GAP SERPL CALC-SCNC: 6 MMOL/L — SIGNIFICANT CHANGE UP (ref 5–17)
AST SERPL-CCNC: 52 U/L — HIGH (ref 10–40)
BASOPHILS # BLD AUTO: 0.1 K/UL — SIGNIFICANT CHANGE UP (ref 0–0.2)
BASOPHILS NFR BLD AUTO: 1 % — SIGNIFICANT CHANGE UP (ref 0–2)
BILIRUB SERPL-MCNC: 0.5 MG/DL — SIGNIFICANT CHANGE UP (ref 0.2–1.2)
BUN SERPL-MCNC: 13 MG/DL — SIGNIFICANT CHANGE UP (ref 7–23)
CALCIUM SERPL-MCNC: 8.2 MG/DL — LOW (ref 8.4–10.5)
CHLORIDE SERPL-SCNC: 110 MMOL/L — HIGH (ref 96–108)
CO2 SERPL-SCNC: 25 MMOL/L — SIGNIFICANT CHANGE UP (ref 22–31)
CREAT SERPL-MCNC: 0.64 MG/DL — SIGNIFICANT CHANGE UP (ref 0.5–1.3)
EOSINOPHIL # BLD AUTO: 0.1 K/UL — SIGNIFICANT CHANGE UP (ref 0–0.5)
EOSINOPHIL NFR BLD AUTO: 1.1 % — SIGNIFICANT CHANGE UP (ref 0–6)
GLUCOSE SERPL-MCNC: 95 MG/DL — SIGNIFICANT CHANGE UP (ref 70–99)
HCT VFR BLD CALC: 40.6 % — SIGNIFICANT CHANGE UP (ref 34.5–45)
HGB BLD-MCNC: 13 G/DL — SIGNIFICANT CHANGE UP (ref 11.5–15.5)
LIDOCAIN IGE QN: 153 U/L — SIGNIFICANT CHANGE UP (ref 73–393)
LYMPHOCYTES # BLD AUTO: 1.6 K/UL — SIGNIFICANT CHANGE UP (ref 1–3.3)
LYMPHOCYTES # BLD AUTO: 27.6 % — SIGNIFICANT CHANGE UP (ref 13–44)
MCHC RBC-ENTMCNC: 29.7 PG — SIGNIFICANT CHANGE UP (ref 27–34)
MCHC RBC-ENTMCNC: 32 GM/DL — SIGNIFICANT CHANGE UP (ref 32–36)
MCV RBC AUTO: 92.8 FL — SIGNIFICANT CHANGE UP (ref 80–100)
MONOCYTES # BLD AUTO: 0.5 K/UL — SIGNIFICANT CHANGE UP (ref 0–0.9)
MONOCYTES NFR BLD AUTO: 8.1 % — SIGNIFICANT CHANGE UP (ref 1–9)
NEUTROPHILS # BLD AUTO: 3.5 K/UL — SIGNIFICANT CHANGE UP (ref 1.8–7.4)
NEUTROPHILS NFR BLD AUTO: 62.2 % — SIGNIFICANT CHANGE UP (ref 43–77)
PLATELET # BLD AUTO: 363 K/UL — SIGNIFICANT CHANGE UP (ref 150–400)
POTASSIUM SERPL-MCNC: 5.1 MMOL/L — SIGNIFICANT CHANGE UP (ref 3.5–5.3)
POTASSIUM SERPL-SCNC: 5.1 MMOL/L — SIGNIFICANT CHANGE UP (ref 3.5–5.3)
PROT SERPL-MCNC: 7.5 G/DL — SIGNIFICANT CHANGE UP (ref 6–8.3)
RBC # BLD: 4.38 M/UL — SIGNIFICANT CHANGE UP (ref 3.8–5.2)
RBC # FLD: 13 % — SIGNIFICANT CHANGE UP (ref 10.3–14.5)
SODIUM SERPL-SCNC: 141 MMOL/L — SIGNIFICANT CHANGE UP (ref 135–145)
WBC # BLD: 5.7 K/UL — SIGNIFICANT CHANGE UP (ref 3.8–10.5)
WBC # FLD AUTO: 5.7 K/UL — SIGNIFICANT CHANGE UP (ref 3.8–10.5)

## 2018-07-31 PROCEDURE — 99284 EMERGENCY DEPT VISIT MOD MDM: CPT | Mod: 25

## 2018-07-31 PROCEDURE — 81025 URINE PREGNANCY TEST: CPT

## 2018-07-31 PROCEDURE — 80053 COMPREHEN METABOLIC PANEL: CPT

## 2018-07-31 PROCEDURE — 96375 TX/PRO/DX INJ NEW DRUG ADDON: CPT

## 2018-07-31 PROCEDURE — 85027 COMPLETE CBC AUTOMATED: CPT

## 2018-07-31 PROCEDURE — 83690 ASSAY OF LIPASE: CPT

## 2018-07-31 PROCEDURE — 99284 EMERGENCY DEPT VISIT MOD MDM: CPT

## 2018-07-31 PROCEDURE — 96374 THER/PROPH/DIAG INJ IV PUSH: CPT

## 2018-07-31 RX ORDER — SODIUM CHLORIDE 9 MG/ML
3 INJECTION INTRAMUSCULAR; INTRAVENOUS; SUBCUTANEOUS ONCE
Qty: 0 | Refills: 0 | Status: COMPLETED | OUTPATIENT
Start: 2018-07-31 | End: 2018-07-31

## 2018-07-31 RX ORDER — KETOROLAC TROMETHAMINE 30 MG/ML
30 SYRINGE (ML) INJECTION ONCE
Qty: 0 | Refills: 0 | Status: DISCONTINUED | OUTPATIENT
Start: 2018-07-31 | End: 2018-07-31

## 2018-07-31 RX ORDER — ONDANSETRON 8 MG/1
4 TABLET, FILM COATED ORAL ONCE
Qty: 0 | Refills: 0 | Status: COMPLETED | OUTPATIENT
Start: 2018-07-31 | End: 2018-07-31

## 2018-07-31 RX ORDER — SODIUM CHLORIDE 9 MG/ML
1000 INJECTION INTRAMUSCULAR; INTRAVENOUS; SUBCUTANEOUS
Qty: 0 | Refills: 0 | Status: DISCONTINUED | OUTPATIENT
Start: 2018-07-31 | End: 2018-08-04

## 2018-07-31 RX ADMIN — SODIUM CHLORIDE 125 MILLILITER(S): 9 INJECTION INTRAMUSCULAR; INTRAVENOUS; SUBCUTANEOUS at 08:40

## 2018-07-31 RX ADMIN — Medication 30 MILLIGRAM(S): at 08:41

## 2018-07-31 RX ADMIN — SODIUM CHLORIDE 3 MILLILITER(S): 9 INJECTION INTRAMUSCULAR; INTRAVENOUS; SUBCUTANEOUS at 08:41

## 2018-07-31 RX ADMIN — ONDANSETRON 4 MILLIGRAM(S): 8 TABLET, FILM COATED ORAL at 08:47

## 2018-07-31 NOTE — ED ADULT NURSE NOTE - CAS DISCH CONDITION
Improved/pt. states abdominal cramping pain improved and denies any nausea upon discharge. States "I want to go home I feel better."

## 2018-07-31 NOTE — ED ADULT NURSE NOTE - OBJECTIVE STATEMENT
pt. presents to ED complaining of nausea and lower abdominal discomfort and lower back pain, LMP 6/21/18, denies any recent travel or fever

## 2018-07-31 NOTE — ED ADULT NURSE NOTE - NSIMPLEMENTINTERV_GEN_ALL_ED
Implemented All Universal Safety Interventions:  [place] to call system. Call bell, personal items and telephone within reach. Instruct patient to call for assistance. Room bathroom lighting operational. Non-slip footwear when patient is off stretcher. Physically safe environment: no spills, clutter or unnecessary equipment. Stretcher in lowest position, wheels locked, appropriate side rails in place.

## 2018-10-17 ENCOUNTER — EMERGENCY (EMERGENCY)
Facility: HOSPITAL | Age: 33
LOS: 1 days | Discharge: HOME | End: 2018-10-17
Attending: INTERNAL MEDICINE | Admitting: INTERNAL MEDICINE
Payer: MEDICAID

## 2018-10-17 VITALS
TEMPERATURE: 98 F | HEART RATE: 70 BPM | RESPIRATION RATE: 17 BRPM | WEIGHT: 115.08 LBS | HEIGHT: 66 IN | DIASTOLIC BLOOD PRESSURE: 68 MMHG | OXYGEN SATURATION: 99 % | SYSTOLIC BLOOD PRESSURE: 104 MMHG

## 2018-10-17 DIAGNOSIS — Z98.891 HISTORY OF UTERINE SCAR FROM PREVIOUS SURGERY: Chronic | ICD-10-CM

## 2018-10-17 DIAGNOSIS — R35.0 FREQUENCY OF MICTURITION: ICD-10-CM

## 2018-10-17 LAB
APPEARANCE UR: CLEAR — SIGNIFICANT CHANGE UP
BACTERIA # UR AUTO: NEGATIVE /HPF — SIGNIFICANT CHANGE UP
BILIRUB UR-MCNC: ABNORMAL
COLOR SPEC: YELLOW — SIGNIFICANT CHANGE UP
COMMENT - URINE: SIGNIFICANT CHANGE UP
DIFF PNL FLD: NEGATIVE — SIGNIFICANT CHANGE UP
EPI CELLS # UR: SIGNIFICANT CHANGE UP
GLUCOSE UR QL: NEGATIVE — SIGNIFICANT CHANGE UP
KETONES UR-MCNC: NEGATIVE — SIGNIFICANT CHANGE UP
LEUKOCYTE ESTERASE UR-ACNC: ABNORMAL
NITRITE UR-MCNC: NEGATIVE — SIGNIFICANT CHANGE UP
PH UR: 6.5 — SIGNIFICANT CHANGE UP (ref 5–8)
PROT UR-MCNC: NEGATIVE — SIGNIFICANT CHANGE UP
RBC CASTS # UR COMP ASSIST: NEGATIVE /HPF — SIGNIFICANT CHANGE UP (ref 0–4)
SP GR SPEC: 1.01 — SIGNIFICANT CHANGE UP (ref 1.01–1.02)
UROBILINOGEN FLD QL: NEGATIVE — SIGNIFICANT CHANGE UP
WBC UR QL: SIGNIFICANT CHANGE UP /HPF (ref 0–5)

## 2018-10-17 PROCEDURE — 87086 URINE CULTURE/COLONY COUNT: CPT

## 2018-10-17 PROCEDURE — 99283 EMERGENCY DEPT VISIT LOW MDM: CPT

## 2018-10-17 PROCEDURE — 81001 URINALYSIS AUTO W/SCOPE: CPT

## 2018-10-17 PROCEDURE — 81025 URINE PREGNANCY TEST: CPT

## 2018-10-17 PROCEDURE — 87186 SC STD MICRODIL/AGAR DIL: CPT

## 2018-10-17 RX ORDER — CEFDINIR 250 MG/5ML
1 POWDER, FOR SUSPENSION ORAL
Qty: 20 | Refills: 0 | OUTPATIENT
Start: 2018-10-17 | End: 2018-10-26

## 2018-10-17 RX ORDER — L.ACIDOPH/B.ANIMALIS/B.LONGUM 15B CELL
1 CAPSULE ORAL
Qty: 14 | Refills: 0 | OUTPATIENT
Start: 2018-10-17 | End: 2018-10-30

## 2018-10-17 RX ORDER — CEFTRIAXONE 500 MG/1
1 INJECTION, POWDER, FOR SOLUTION INTRAMUSCULAR; INTRAVENOUS ONCE
Qty: 0 | Refills: 0 | Status: COMPLETED | OUTPATIENT
Start: 2018-10-17 | End: 2018-10-17

## 2018-10-17 RX ORDER — CEFDINIR 250 MG/5ML
1 POWDER, FOR SUSPENSION ORAL
Qty: 20 | Refills: 0
Start: 2018-10-17 | End: 2018-10-26

## 2018-10-17 RX ORDER — L.ACIDOPH/B.ANIMALIS/B.LONGUM 15B CELL
1 CAPSULE ORAL
Qty: 14 | Refills: 0
Start: 2018-10-17 | End: 2018-10-30

## 2018-10-17 NOTE — ED PROVIDER NOTE - PLAN OF CARE
Rest, drink plenty of fluids  Take Bactrim DS two times a day for 10 days  Take OTC Motrin or Tylenol as directed, as needed for pain  Follow up with your PMD 2-3 days  Return to the ER for worsening Rest, drink plenty of fluids  Take Omnicef 300mg two times a day for 14 days.  Take OTC Motrin or Tylenol as directed, as needed for pain  Follow up with your PMD 2-3 days  Return to the ER for worsening Rest, drink plenty of fluids  Take Omnicef 300mg two times a day for 10 days.  Take OTC Motrin or Tylenol as directed, as needed for pain  Follow up with your PMD 2-3 days  Return to the ER for worsening

## 2018-10-17 NOTE — ED PROVIDER NOTE - CARE PLAN
Principal Discharge DX:	UTI (urinary tract infection)  Assessment and plan of treatment:	Rest, drink plenty of fluids  Take Bactrim DS two times a day for 10 days  Take OTC Motrin or Tylenol as directed, as needed for pain  Follow up with your PMD 2-3 days  Return to the ER for worsening Principal Discharge DX:	UTI (urinary tract infection)  Assessment and plan of treatment:	Rest, drink plenty of fluids  Take Omnicef 300mg two times a day for 14 days.  Take OTC Motrin or Tylenol as directed, as needed for pain  Follow up with your PMD 2-3 days  Return to the ER for worsening Principal Discharge DX:	UTI (urinary tract infection)  Assessment and plan of treatment:	Rest, drink plenty of fluids  Take Omnicef 300mg two times a day for 10 days.  Take OTC Motrin or Tylenol as directed, as needed for pain  Follow up with your PMD 2-3 days  Return to the ER for worsening

## 2018-10-17 NOTE — ED PROVIDER NOTE - OBJECTIVE STATEMENT
33 year old female, PMHx of nephrolithiasis, recurrent UTIs; presents to the ED complaining of "I have a UTI" x 4 days. Patient states she has been having worsening dysuria described as burning, urinary frequency x 4 days. For the past day she has also c/o bilateral low back pain described as a dull ache "not like my kidney stone pain". She also endorses mild nausea and chills but no fevers. She denies hematuria, vaginal discharge, vomiting, diarrhea or other complaints. Pts GYN is on vacation.

## 2018-10-17 NOTE — ED ADULT NURSE NOTE - NSIMPLEMENTINTERV_GEN_ALL_ED
Implemented All Universal Safety Interventions:  [place] to call system. Call bell, personal items and telephone within reach. Instruct patient to call for assistance. Room bathroom lighting operational. Non-slip footwear when patient is off stretcher. Physically safe environment: no spills, clutter or unnecessary equipment. Stretcher in lowest position, wheels locked, appropriate side rails in place.

## 2018-10-17 NOTE — ED PROVIDER NOTE - ATTENDING CONTRIBUTION TO CARE
33 y f cc dysuria , hx of renal stones, uti  pe suprapubic, bilateral cva tenderness   rx as pyelo  Dr. Lai:  I have reviewed and discussed with the PA/ resident the case specifics, including the history, physical assessment, evaluation, conclusion, laboratory results, and medical plan. I agree with the contents, and conclusions. I have personally examined, and interviewed the patient.

## 2018-10-17 NOTE — ED ADULT NURSE NOTE - OBJECTIVE STATEMENT
Pt presents to ED with complaints of lower back pain and lower abdominal cramping that began about 5 days ago. Pt reports burning on urination.

## 2018-12-19 NOTE — ED ADULT NURSE NOTE - BREATHING, MLM
[address] [facility] [place]. [place], [address]  Tel.  [phone]  Fax. [address] [facility] [address]  [place], [address] [facility]  Tel.  [phone]  Fax. [phone] [address]  Tel.  [phone]  Fax. [phone]       S>Sally Sueanne Cabot is a 59 y.o. female seen today with a donated positive airway pressure machine for regular home use. Patient got device from her brother. (Air Sense 10 - Auto Set) She also has a new mask she got from her brother (ResMed AirFit F-20, medium). · Physician orders were reviewed and pressure was set at 5-10 cmH2O. Minimum pressure was increased from 4 cm to 5 cm for patient comfort. · Mask evaluation was performed. She was likewise informed on the use of her mask. · Patient will use the mask she has for right now, but will contact her DME for supplies. · The patient demonstrated good understanding of the positive airway pressure device. O>    There were no vitals taken for this visit. A>  1. Obstructive sleep apnea (adult) (pediatric)      AHI = 14.      P>  · Follow-up Disposition:  · Return in about 2 months (around 2/20/2019). · General information regarding operations and maintenance of the device was provided. · She was provided information on sleep apnea including coresponding risk factors and the importance of proper treatment. · Provided patient with troubleshooting and device cleaning/maintainence information. · She was advised this donated device is a one time offer and referred to other sources for additional resupply and equipment. · She was asked to contact our office for any problems regarding her PAP therapy. Spontaneous, unlabored and symmetrical

## 2018-12-20 NOTE — ED PROVIDER NOTE - DISPOSITION TYPE
I have not received any consultations from [facility] [place].  Please request DISCHARGE

## 2019-01-15 ENCOUNTER — EMERGENCY (EMERGENCY)
Facility: HOSPITAL | Age: 34
LOS: 1 days | Discharge: HOME | End: 2019-01-15
Attending: EMERGENCY MEDICINE | Admitting: EMERGENCY MEDICINE
Payer: MEDICAID

## 2019-01-15 VITALS
OXYGEN SATURATION: 99 % | HEART RATE: 76 BPM | RESPIRATION RATE: 16 BRPM | TEMPERATURE: 98 F | SYSTOLIC BLOOD PRESSURE: 93 MMHG | DIASTOLIC BLOOD PRESSURE: 54 MMHG

## 2019-01-15 VITALS
SYSTOLIC BLOOD PRESSURE: 94 MMHG | RESPIRATION RATE: 18 BRPM | DIASTOLIC BLOOD PRESSURE: 69 MMHG | HEIGHT: 66 IN | TEMPERATURE: 97 F | HEART RATE: 81 BPM | WEIGHT: 110.01 LBS | OXYGEN SATURATION: 100 %

## 2019-01-15 DIAGNOSIS — Z98.891 HISTORY OF UTERINE SCAR FROM PREVIOUS SURGERY: Chronic | ICD-10-CM

## 2019-01-15 LAB
ALBUMIN SERPL ELPH-MCNC: 3.7 G/DL — SIGNIFICANT CHANGE UP (ref 3.3–5)
ALP SERPL-CCNC: 47 U/L — SIGNIFICANT CHANGE UP (ref 40–120)
ALT FLD-CCNC: 11 U/L DA — SIGNIFICANT CHANGE UP (ref 10–45)
ANION GAP SERPL CALC-SCNC: 7 MMOL/L — SIGNIFICANT CHANGE UP (ref 5–17)
ANISOCYTOSIS BLD QL: SLIGHT — SIGNIFICANT CHANGE UP
APPEARANCE UR: CLEAR — SIGNIFICANT CHANGE UP
AST SERPL-CCNC: 24 U/L — SIGNIFICANT CHANGE UP (ref 10–40)
BACTERIA # UR AUTO: NEGATIVE /HPF — SIGNIFICANT CHANGE UP
BASOPHILS NFR BLD AUTO: 1 % — SIGNIFICANT CHANGE UP (ref 0–2)
BILIRUB SERPL-MCNC: 0.4 MG/DL — SIGNIFICANT CHANGE UP (ref 0.2–1.2)
BILIRUB UR-MCNC: ABNORMAL
BUN SERPL-MCNC: 10 MG/DL — SIGNIFICANT CHANGE UP (ref 7–23)
CALCIUM SERPL-MCNC: 9 MG/DL — SIGNIFICANT CHANGE UP (ref 8.4–10.5)
CHLORIDE SERPL-SCNC: 103 MMOL/L — SIGNIFICANT CHANGE UP (ref 96–108)
CO2 SERPL-SCNC: 25 MMOL/L — SIGNIFICANT CHANGE UP (ref 22–31)
COLOR SPEC: YELLOW — SIGNIFICANT CHANGE UP
COMMENT - URINE: SIGNIFICANT CHANGE UP
CREAT SERPL-MCNC: 0.59 MG/DL — SIGNIFICANT CHANGE UP (ref 0.5–1.3)
DIFF PNL FLD: NEGATIVE — SIGNIFICANT CHANGE UP
EPI CELLS # UR: ABNORMAL
GLUCOSE SERPL-MCNC: 89 MG/DL — SIGNIFICANT CHANGE UP (ref 70–99)
GLUCOSE UR QL: NEGATIVE — SIGNIFICANT CHANGE UP
HCG SERPL-ACNC: HIGH MIU/ML
HCT VFR BLD CALC: 42.3 % — SIGNIFICANT CHANGE UP (ref 34.5–45)
HGB BLD-MCNC: 14.1 G/DL — SIGNIFICANT CHANGE UP (ref 11.5–15.5)
KETONES UR-MCNC: NEGATIVE — SIGNIFICANT CHANGE UP
LEUKOCYTE ESTERASE UR-ACNC: NEGATIVE — SIGNIFICANT CHANGE UP
LYMPHOCYTES # BLD AUTO: 28 % — SIGNIFICANT CHANGE UP (ref 13–44)
MAGNESIUM SERPL-MCNC: 1.7 MG/DL — SIGNIFICANT CHANGE UP (ref 1.6–2.6)
MCHC RBC-ENTMCNC: 30.6 PG — SIGNIFICANT CHANGE UP (ref 27–34)
MCHC RBC-ENTMCNC: 33.2 GM/DL — SIGNIFICANT CHANGE UP (ref 32–36)
MCV RBC AUTO: 92.2 FL — SIGNIFICANT CHANGE UP (ref 80–100)
MONOCYTES NFR BLD AUTO: 4 % — SIGNIFICANT CHANGE UP (ref 2–14)
NEUTROPHILS NFR BLD AUTO: 64 % — SIGNIFICANT CHANGE UP (ref 43–77)
NITRITE UR-MCNC: NEGATIVE — SIGNIFICANT CHANGE UP
PH UR: 7 — SIGNIFICANT CHANGE UP (ref 5–8)
PLAT MORPH BLD: NORMAL — SIGNIFICANT CHANGE UP
PLATELET # BLD AUTO: 244 K/UL — SIGNIFICANT CHANGE UP (ref 150–400)
POTASSIUM SERPL-MCNC: 5 MMOL/L — SIGNIFICANT CHANGE UP (ref 3.5–5.3)
POTASSIUM SERPL-SCNC: 5 MMOL/L — SIGNIFICANT CHANGE UP (ref 3.5–5.3)
PROT SERPL-MCNC: 7.3 G/DL — SIGNIFICANT CHANGE UP (ref 6–8.3)
PROT UR-MCNC: 15
RBC # BLD: 4.59 M/UL — SIGNIFICANT CHANGE UP (ref 3.8–5.2)
RBC # FLD: 12 % — SIGNIFICANT CHANGE UP (ref 10.3–14.5)
RBC BLD AUTO: ABNORMAL
RBC CASTS # UR COMP ASSIST: SIGNIFICANT CHANGE UP /HPF (ref 0–4)
SODIUM SERPL-SCNC: 135 MMOL/L — SIGNIFICANT CHANGE UP (ref 135–145)
SP GR SPEC: 1.01 — SIGNIFICANT CHANGE UP (ref 1.01–1.02)
UROBILINOGEN FLD QL: NEGATIVE — SIGNIFICANT CHANGE UP
VARIANT LYMPHS # BLD: 3 % — SIGNIFICANT CHANGE UP (ref 0–6)
WBC # BLD: 7.3 K/UL — SIGNIFICANT CHANGE UP (ref 3.8–10.5)
WBC # FLD AUTO: 7.3 K/UL — SIGNIFICANT CHANGE UP (ref 3.8–10.5)
WBC UR QL: SIGNIFICANT CHANGE UP /HPF (ref 0–5)

## 2019-01-15 PROCEDURE — 99284 EMERGENCY DEPT VISIT MOD MDM: CPT

## 2019-01-15 PROCEDURE — 87086 URINE CULTURE/COLONY COUNT: CPT

## 2019-01-15 PROCEDURE — 99284 EMERGENCY DEPT VISIT MOD MDM: CPT | Mod: 25

## 2019-01-15 PROCEDURE — 96374 THER/PROPH/DIAG INJ IV PUSH: CPT

## 2019-01-15 PROCEDURE — 85027 COMPLETE CBC AUTOMATED: CPT

## 2019-01-15 PROCEDURE — 96375 TX/PRO/DX INJ NEW DRUG ADDON: CPT

## 2019-01-15 PROCEDURE — 80053 COMPREHEN METABOLIC PANEL: CPT

## 2019-01-15 PROCEDURE — 84702 CHORIONIC GONADOTROPIN TEST: CPT

## 2019-01-15 PROCEDURE — 81001 URINALYSIS AUTO W/SCOPE: CPT

## 2019-01-15 PROCEDURE — 83735 ASSAY OF MAGNESIUM: CPT

## 2019-01-15 RX ORDER — FAMOTIDINE 10 MG/ML
20 INJECTION INTRAVENOUS ONCE
Qty: 0 | Refills: 0 | Status: COMPLETED | OUTPATIENT
Start: 2019-01-15 | End: 2019-01-15

## 2019-01-15 RX ORDER — ONDANSETRON 8 MG/1
1 TABLET, FILM COATED ORAL
Qty: 15 | Refills: 0
Start: 2019-01-15 | End: 2019-01-19

## 2019-01-15 RX ORDER — ONDANSETRON 8 MG/1
4 TABLET, FILM COATED ORAL ONCE
Qty: 0 | Refills: 0 | Status: COMPLETED | OUTPATIENT
Start: 2019-01-15 | End: 2019-01-15

## 2019-01-15 RX ORDER — SODIUM CHLORIDE 9 MG/ML
2000 INJECTION INTRAMUSCULAR; INTRAVENOUS; SUBCUTANEOUS ONCE
Qty: 0 | Refills: 0 | Status: COMPLETED | OUTPATIENT
Start: 2019-01-15 | End: 2019-01-15

## 2019-01-15 RX ADMIN — ONDANSETRON 4 MILLIGRAM(S): 8 TABLET, FILM COATED ORAL at 10:21

## 2019-01-15 RX ADMIN — SODIUM CHLORIDE 1333.33 MILLILITER(S): 9 INJECTION INTRAMUSCULAR; INTRAVENOUS; SUBCUTANEOUS at 10:21

## 2019-01-15 RX ADMIN — FAMOTIDINE 100 MILLIGRAM(S): 10 INJECTION INTRAVENOUS at 10:21

## 2019-01-15 NOTE — ED PROVIDER NOTE - MEDICAL DECISION MAKING DETAILS
Nausea and vomiting in pregnancy. Will give Pepcid and Zofran with fluids for hydration. Check lytes.

## 2019-01-15 NOTE — ED PROVIDER NOTE - NSFOLLOWUPINSTRUCTIONS_ED_ALL_ED_FT
Return to the ER should there be worsening or concerns.   Take Zofran Dissolving tabs every 8 hours as needed for nausea or vomiting  Take your vitamins.   Follow up with your doctor this week.

## 2019-01-15 NOTE — ED PROVIDER NOTE - OBJECTIVE STATEMENT
Patient presents  7 weeks preg LMP was on Thanksgiving and she is reporting intermittent daily vomiting with nausea. She was prescribed zofran by her PCP but she states that she can't take it because taking a pill makes her nauseous. She tries to eat and drink but often times has voimting. No abd pain, No vaginal bleeding. C/O weakness and dehydration.

## 2019-01-16 LAB
CULTURE RESULTS: SIGNIFICANT CHANGE UP
SPECIMEN SOURCE: SIGNIFICANT CHANGE UP

## 2019-01-27 ENCOUNTER — EMERGENCY (EMERGENCY)
Facility: HOSPITAL | Age: 34
LOS: 1 days | Discharge: HOME | End: 2019-01-27
Attending: EMERGENCY MEDICINE | Admitting: EMERGENCY MEDICINE
Payer: MEDICAID

## 2019-01-27 VITALS
HEART RATE: 100 BPM | DIASTOLIC BLOOD PRESSURE: 85 MMHG | SYSTOLIC BLOOD PRESSURE: 116 MMHG | TEMPERATURE: 98 F | RESPIRATION RATE: 18 BRPM | OXYGEN SATURATION: 100 % | WEIGHT: 110.01 LBS | HEIGHT: 66 IN

## 2019-01-27 DIAGNOSIS — Z98.891 HISTORY OF UTERINE SCAR FROM PREVIOUS SURGERY: Chronic | ICD-10-CM

## 2019-01-27 LAB
APPEARANCE UR: CLEAR — SIGNIFICANT CHANGE UP
BILIRUB UR-MCNC: ABNORMAL
COLOR SPEC: YELLOW — SIGNIFICANT CHANGE UP
DIFF PNL FLD: NEGATIVE — SIGNIFICANT CHANGE UP
GLUCOSE UR QL: NEGATIVE — SIGNIFICANT CHANGE UP
KETONES UR-MCNC: NEGATIVE — SIGNIFICANT CHANGE UP
LEUKOCYTE ESTERASE UR-ACNC: NEGATIVE — SIGNIFICANT CHANGE UP
NITRITE UR-MCNC: NEGATIVE — SIGNIFICANT CHANGE UP
PH UR: 6.5 — SIGNIFICANT CHANGE UP (ref 5–8)
PROT UR-MCNC: NEGATIVE — SIGNIFICANT CHANGE UP
SP GR SPEC: 1.02 — SIGNIFICANT CHANGE UP (ref 1.01–1.02)
UROBILINOGEN FLD QL: NEGATIVE — SIGNIFICANT CHANGE UP

## 2019-01-27 PROCEDURE — 99283 EMERGENCY DEPT VISIT LOW MDM: CPT | Mod: 25

## 2019-01-27 RX ORDER — NITROFURANTOIN MACROCRYSTAL 50 MG
100 CAPSULE ORAL ONCE
Qty: 0 | Refills: 0 | Status: COMPLETED | OUTPATIENT
Start: 2019-01-27 | End: 2019-01-27

## 2019-01-27 NOTE — ED PROVIDER NOTE - OBJECTIVE STATEMENT
pt with dysuria symptoms in early pregnancy, seen here a few days ago, had negative urine culture but with some bacteria in urine.  pt states that she went to her GYN who said she had a urine infection and was calling in a prescription but her pharmacy closed and is coming to have macrobid sent to a 24 hour pharmacy.  has confirmed IUP by gyn as per pt, no vaginal spotting or bleeding

## 2019-01-27 NOTE — ED ADULT NURSE NOTE - NSIMPLEMENTINTERV_GEN_ALL_ED
Implemented All Universal Safety Interventions:  [place] to call system. Call bell, personal items and telephone within reach. Instruct patient to call for assistance. Room bathroom lighting operational. Non-slip footwear when patient is off stretcher. Physically safe environment: no spills, clutter or unnecessary equipment. Stretcher in lowest position, wheels locked, appropriate side rails in place.

## 2019-01-27 NOTE — ED ADULT TRIAGE NOTE - CHIEF COMPLAINT QUOTE
C/O lower abdominal pain with nausea and painful urination. Pt states that "she is 9 weeks pregnant"

## 2019-01-27 NOTE — ED PROVIDER NOTE - NSFOLLOWUPINSTRUCTIONS_ED_ALL_ED_FT
-- Continue all previously prescribed medications as directed unless otherwise instructed.    -- Follow up with your primary care physician in 48-72 hours- bring copies of your results.    -- Return to the ER for worsening or persistent symptoms, and/or ANY NEW OR CONCERNING SYMPTOMS.    -- If you have issues obtaining follow up, please call: [phone] to obtain a [facility] doctor or specialist who takes your insurance in your area.

## 2019-01-28 VITALS
HEART RATE: 88 BPM | SYSTOLIC BLOOD PRESSURE: 112 MMHG | OXYGEN SATURATION: 100 % | RESPIRATION RATE: 18 BRPM | DIASTOLIC BLOOD PRESSURE: 80 MMHG

## 2019-01-28 PROCEDURE — 99283 EMERGENCY DEPT VISIT LOW MDM: CPT

## 2019-01-28 RX ORDER — NITROFURANTOIN MACROCRYSTAL 50 MG
1 CAPSULE ORAL
Qty: 20 | Refills: 0
Start: 2019-01-28 | End: 2019-02-06

## 2019-01-28 RX ADMIN — Medication 100 MILLIGRAM(S): at 00:07

## 2019-02-16 ENCOUNTER — EMERGENCY (EMERGENCY)
Facility: HOSPITAL | Age: 34
LOS: 1 days | Discharge: HOME | End: 2019-02-16
Attending: EMERGENCY MEDICINE | Admitting: EMERGENCY MEDICINE
Payer: MEDICAID

## 2019-02-16 VITALS
HEIGHT: 66 IN | WEIGHT: 111.99 LBS | SYSTOLIC BLOOD PRESSURE: 113 MMHG | RESPIRATION RATE: 18 BRPM | OXYGEN SATURATION: 95 % | DIASTOLIC BLOOD PRESSURE: 82 MMHG | HEART RATE: 86 BPM | TEMPERATURE: 99 F

## 2019-02-16 VITALS
RESPIRATION RATE: 17 BRPM | HEART RATE: 76 BPM | OXYGEN SATURATION: 99 % | TEMPERATURE: 99 F | SYSTOLIC BLOOD PRESSURE: 106 MMHG | DIASTOLIC BLOOD PRESSURE: 61 MMHG

## 2019-02-16 DIAGNOSIS — R51 HEADACHE: ICD-10-CM

## 2019-02-16 DIAGNOSIS — Z98.891 HISTORY OF UTERINE SCAR FROM PREVIOUS SURGERY: Chronic | ICD-10-CM

## 2019-02-16 LAB
ALBUMIN SERPL ELPH-MCNC: 3 G/DL — LOW (ref 3.3–5)
ALP SERPL-CCNC: 30 U/L — LOW (ref 40–120)
ALT FLD-CCNC: 17 U/L DA — SIGNIFICANT CHANGE UP (ref 10–45)
ANION GAP SERPL CALC-SCNC: 8 MMOL/L — SIGNIFICANT CHANGE UP (ref 5–17)
APPEARANCE UR: CLEAR — SIGNIFICANT CHANGE UP
AST SERPL-CCNC: 24 U/L — SIGNIFICANT CHANGE UP (ref 10–40)
BASOPHILS # BLD AUTO: 0.1 K/UL — SIGNIFICANT CHANGE UP (ref 0–0.2)
BASOPHILS NFR BLD AUTO: 0.7 % — SIGNIFICANT CHANGE UP (ref 0–2)
BILIRUB SERPL-MCNC: 0.3 MG/DL — SIGNIFICANT CHANGE UP (ref 0.2–1.2)
BILIRUB UR-MCNC: NEGATIVE — SIGNIFICANT CHANGE UP
BUN SERPL-MCNC: 10 MG/DL — SIGNIFICANT CHANGE UP (ref 7–23)
CALCIUM SERPL-MCNC: 8.7 MG/DL — SIGNIFICANT CHANGE UP (ref 8.4–10.5)
CHLORIDE SERPL-SCNC: 103 MMOL/L — SIGNIFICANT CHANGE UP (ref 96–108)
CO2 SERPL-SCNC: 26 MMOL/L — SIGNIFICANT CHANGE UP (ref 22–31)
COLOR SPEC: YELLOW — SIGNIFICANT CHANGE UP
CREAT SERPL-MCNC: 0.57 MG/DL — SIGNIFICANT CHANGE UP (ref 0.5–1.3)
DIFF PNL FLD: NEGATIVE — SIGNIFICANT CHANGE UP
EOSINOPHIL # BLD AUTO: 0.1 K/UL — SIGNIFICANT CHANGE UP (ref 0–0.5)
EOSINOPHIL NFR BLD AUTO: 1 % — SIGNIFICANT CHANGE UP (ref 0–6)
GLUCOSE SERPL-MCNC: 102 MG/DL — HIGH (ref 70–99)
GLUCOSE UR QL: NEGATIVE — SIGNIFICANT CHANGE UP
HCT VFR BLD CALC: 38.9 % — SIGNIFICANT CHANGE UP (ref 34.5–45)
HGB BLD-MCNC: 12.8 G/DL — SIGNIFICANT CHANGE UP (ref 11.5–15.5)
KETONES UR-MCNC: NEGATIVE — SIGNIFICANT CHANGE UP
LEUKOCYTE ESTERASE UR-ACNC: NEGATIVE — SIGNIFICANT CHANGE UP
LYMPHOCYTES # BLD AUTO: 1.9 K/UL — SIGNIFICANT CHANGE UP (ref 1–3.3)
LYMPHOCYTES # BLD AUTO: 25 % — SIGNIFICANT CHANGE UP (ref 13–44)
MCHC RBC-ENTMCNC: 30.8 PG — SIGNIFICANT CHANGE UP (ref 27–34)
MCHC RBC-ENTMCNC: 32.8 GM/DL — SIGNIFICANT CHANGE UP (ref 32–36)
MCV RBC AUTO: 94 FL — SIGNIFICANT CHANGE UP (ref 80–100)
MONOCYTES # BLD AUTO: 0.5 K/UL — SIGNIFICANT CHANGE UP (ref 0–0.9)
MONOCYTES NFR BLD AUTO: 6.3 % — SIGNIFICANT CHANGE UP (ref 2–14)
NEUTROPHILS # BLD AUTO: 5.1 K/UL — SIGNIFICANT CHANGE UP (ref 1.8–7.4)
NEUTROPHILS NFR BLD AUTO: 67 % — SIGNIFICANT CHANGE UP (ref 43–77)
NITRITE UR-MCNC: NEGATIVE — SIGNIFICANT CHANGE UP
PH UR: 7 — SIGNIFICANT CHANGE UP (ref 5–8)
PLATELET # BLD AUTO: 334 K/UL — SIGNIFICANT CHANGE UP (ref 150–400)
POTASSIUM SERPL-MCNC: 4.2 MMOL/L — SIGNIFICANT CHANGE UP (ref 3.5–5.3)
POTASSIUM SERPL-SCNC: 4.2 MMOL/L — SIGNIFICANT CHANGE UP (ref 3.5–5.3)
PROT SERPL-MCNC: 6.7 G/DL — SIGNIFICANT CHANGE UP (ref 6–8.3)
PROT UR-MCNC: NEGATIVE — SIGNIFICANT CHANGE UP
RBC # BLD: 4.14 M/UL — SIGNIFICANT CHANGE UP (ref 3.8–5.2)
RBC # FLD: 11.7 % — SIGNIFICANT CHANGE UP (ref 10.3–14.5)
SODIUM SERPL-SCNC: 137 MMOL/L — SIGNIFICANT CHANGE UP (ref 135–145)
SP GR SPEC: 1.01 — SIGNIFICANT CHANGE UP (ref 1.01–1.02)
UROBILINOGEN FLD QL: NEGATIVE — SIGNIFICANT CHANGE UP
WBC # BLD: 7.6 K/UL — SIGNIFICANT CHANGE UP (ref 3.8–10.5)
WBC # FLD AUTO: 7.6 K/UL — SIGNIFICANT CHANGE UP (ref 3.8–10.5)

## 2019-02-16 PROCEDURE — 85027 COMPLETE CBC AUTOMATED: CPT

## 2019-02-16 PROCEDURE — 96361 HYDRATE IV INFUSION ADD-ON: CPT

## 2019-02-16 PROCEDURE — 99284 EMERGENCY DEPT VISIT MOD MDM: CPT

## 2019-02-16 PROCEDURE — 80053 COMPREHEN METABOLIC PANEL: CPT

## 2019-02-16 PROCEDURE — 36415 COLL VENOUS BLD VENIPUNCTURE: CPT

## 2019-02-16 PROCEDURE — 87086 URINE CULTURE/COLONY COUNT: CPT

## 2019-02-16 PROCEDURE — 99284 EMERGENCY DEPT VISIT MOD MDM: CPT | Mod: 25

## 2019-02-16 PROCEDURE — 96374 THER/PROPH/DIAG INJ IV PUSH: CPT

## 2019-02-16 RX ORDER — SODIUM CHLORIDE 9 MG/ML
1000 INJECTION INTRAMUSCULAR; INTRAVENOUS; SUBCUTANEOUS ONCE
Qty: 0 | Refills: 0 | Status: COMPLETED | OUTPATIENT
Start: 2019-02-16 | End: 2019-02-16

## 2019-02-16 RX ORDER — ONDANSETRON 8 MG/1
4 TABLET, FILM COATED ORAL ONCE
Qty: 0 | Refills: 0 | Status: COMPLETED | OUTPATIENT
Start: 2019-02-16 | End: 2019-02-16

## 2019-02-16 RX ORDER — ACETAMINOPHEN 500 MG
975 TABLET ORAL ONCE
Qty: 0 | Refills: 0 | Status: COMPLETED | OUTPATIENT
Start: 2019-02-16 | End: 2019-02-16

## 2019-02-16 RX ORDER — SODIUM CHLORIDE 9 MG/ML
3 INJECTION INTRAMUSCULAR; INTRAVENOUS; SUBCUTANEOUS ONCE
Qty: 0 | Refills: 0 | Status: COMPLETED | OUTPATIENT
Start: 2019-02-16 | End: 2019-02-16

## 2019-02-16 RX ADMIN — SODIUM CHLORIDE 1000 MILLILITER(S): 9 INJECTION INTRAMUSCULAR; INTRAVENOUS; SUBCUTANEOUS at 09:52

## 2019-02-16 RX ADMIN — SODIUM CHLORIDE 1000 MILLILITER(S): 9 INJECTION INTRAMUSCULAR; INTRAVENOUS; SUBCUTANEOUS at 08:33

## 2019-02-16 RX ADMIN — ONDANSETRON 4 MILLIGRAM(S): 8 TABLET, FILM COATED ORAL at 10:18

## 2019-02-16 RX ADMIN — SODIUM CHLORIDE 2000 MILLILITER(S): 9 INJECTION INTRAMUSCULAR; INTRAVENOUS; SUBCUTANEOUS at 10:18

## 2019-02-16 RX ADMIN — SODIUM CHLORIDE 3 MILLILITER(S): 9 INJECTION INTRAMUSCULAR; INTRAVENOUS; SUBCUTANEOUS at 08:31

## 2019-02-16 NOTE — ED PROVIDER NOTE - NS ED ROS FT
Except as otherwise indicated in HPI:  CONSTITUTIONAL: Neg  HEENT: neg  CV: neg  Resp: neg  GI: +nausea, +emesis, no abd pain  : Neg  MSK: Neg  SKIN: Neg  NEURO: +ha  PSYCHIATRIC: Neg  Heme/Onc: Neg

## 2019-02-16 NOTE — ED PROVIDER NOTE - PHYSICAL EXAMINATION
Gen:  alert, awake, no acute distress  Head:  atraumatic, normocephalic  HEENT: PERRLA, EOMI, normal nose, dry oropharynx, no tonsillar edema, erythema, or exudate; full painless rom neck  CV:  rrr, nl S1, S2, no m/r/g  Pulm:  lungs CTA b/l  Abd: s/nt/nd, +BS  MSK:  moving all extremities, no back midline ttp, no stepoffs, no cva TTP  Neuro:  grossly intact, no focal deficits  Skin:  clear, dry, intact  Psych: AOx3, normal affect, no apparent risk to self or others

## 2019-02-16 NOTE — ED PROVIDER NOTE - OBJECTIVE STATEMENT
Pt , 3 months pregnant. C/o diffuse ha that started on , generalzed, also nausea and nbnb emesis 5 times since . no abd pain, fever or chills. n o cp, no sob. no neck pain or stiffness. no urinary symptoms.

## 2019-02-16 NOTE — ED PROVIDER NOTE - CLINICAL SUMMARY MEDICAL DECISION MAKING FREE TEXT BOX
pt pregnant with ha nausea and emesis, will hydrate, check bloodwork and urine, analgesia, re-assess

## 2019-02-16 NOTE — ED ADULT NURSE NOTE - NSIMPLEMENTINTERV_GEN_ALL_ED
Implemented All Universal Safety Interventions:  [place] to call system. Call bell, personal items and telephone within reach. Instruct patient to call for assistance. Room bathroom lighting operational. Non-slip footwear when patient is off stretcher. Physically safe environment: no spills, clutter or unnecessary equipment. Stretcher in lowest position, wheels locked, appropriate side rails in place.

## 2019-02-17 LAB
CULTURE RESULTS: SIGNIFICANT CHANGE UP
SPECIMEN SOURCE: SIGNIFICANT CHANGE UP

## 2019-02-22 ENCOUNTER — APPOINTMENT (OUTPATIENT)
Dept: ANTEPARTUM | Facility: CLINIC | Age: 34
End: 2019-02-22
Payer: MEDICAID

## 2019-02-22 ENCOUNTER — ASOB RESULT (OUTPATIENT)
Age: 34
End: 2019-02-22

## 2019-02-22 ENCOUNTER — APPOINTMENT (OUTPATIENT)
Dept: MATERNAL FETAL MEDICINE | Facility: CLINIC | Age: 34
End: 2019-02-22

## 2019-02-22 PROCEDURE — 36416 COLLJ CAPILLARY BLOOD SPEC: CPT

## 2019-02-22 PROCEDURE — 76813 OB US NUCHAL MEAS 1 GEST: CPT

## 2019-02-27 LAB
1ST TRIMESTER DATA: NORMAL
ADDENDUM DOC: NORMAL
AFP PNL SERPL: NORMAL
AFP SERPL-ACNC: NORMAL
CLINICAL BIOCHEMIST REVIEW: NORMAL
FREE BETA HCG 1ST TRIMESTER: NORMAL
Lab: NORMAL
NASAL BONE: PRESENT
NOTES NTD: NORMAL
NT: NORMAL
PAPP-A SERPL-ACNC: NORMAL
TRISOMY 18/3: NORMAL

## 2019-03-02 ENCOUNTER — EMERGENCY (EMERGENCY)
Facility: HOSPITAL | Age: 34
LOS: 1 days | Discharge: HOME | End: 2019-03-02
Attending: EMERGENCY MEDICINE | Admitting: EMERGENCY MEDICINE
Payer: MEDICAID

## 2019-03-02 VITALS
SYSTOLIC BLOOD PRESSURE: 113 MMHG | HEIGHT: 66 IN | DIASTOLIC BLOOD PRESSURE: 76 MMHG | OXYGEN SATURATION: 97 % | TEMPERATURE: 98 F | WEIGHT: 117.07 LBS | HEART RATE: 83 BPM | RESPIRATION RATE: 18 BRPM

## 2019-03-02 DIAGNOSIS — Z98.891 HISTORY OF UTERINE SCAR FROM PREVIOUS SURGERY: Chronic | ICD-10-CM

## 2019-03-02 LAB
ANION GAP SERPL CALC-SCNC: 7 MMOL/L — SIGNIFICANT CHANGE UP (ref 5–17)
BUN SERPL-MCNC: 14 MG/DL — SIGNIFICANT CHANGE UP (ref 7–23)
CALCIUM SERPL-MCNC: 9.6 MG/DL — SIGNIFICANT CHANGE UP (ref 8.4–10.5)
CHLORIDE SERPL-SCNC: 99 MMOL/L — SIGNIFICANT CHANGE UP (ref 96–108)
CO2 SERPL-SCNC: 25 MMOL/L — SIGNIFICANT CHANGE UP (ref 22–31)
CREAT SERPL-MCNC: 0.49 MG/DL — LOW (ref 0.5–1.3)
GLUCOSE SERPL-MCNC: 82 MG/DL — SIGNIFICANT CHANGE UP (ref 70–99)
POTASSIUM SERPL-MCNC: 5 MMOL/L — SIGNIFICANT CHANGE UP (ref 3.5–5.3)
POTASSIUM SERPL-SCNC: 5 MMOL/L — SIGNIFICANT CHANGE UP (ref 3.5–5.3)
SODIUM SERPL-SCNC: 131 MMOL/L — LOW (ref 135–145)

## 2019-03-02 PROCEDURE — 99284 EMERGENCY DEPT VISIT MOD MDM: CPT | Mod: 25

## 2019-03-02 PROCEDURE — 80053 COMPREHEN METABOLIC PANEL: CPT

## 2019-03-02 PROCEDURE — 96375 TX/PRO/DX INJ NEW DRUG ADDON: CPT

## 2019-03-02 PROCEDURE — 96365 THER/PROPH/DIAG IV INF INIT: CPT

## 2019-03-02 PROCEDURE — 85027 COMPLETE CBC AUTOMATED: CPT

## 2019-03-02 RX ORDER — FAMOTIDINE 10 MG/ML
20 INJECTION INTRAVENOUS ONCE
Qty: 0 | Refills: 0 | Status: COMPLETED | OUTPATIENT
Start: 2019-03-02 | End: 2019-03-02

## 2019-03-02 RX ORDER — ONDANSETRON 8 MG/1
4 TABLET, FILM COATED ORAL ONCE
Qty: 0 | Refills: 0 | Status: COMPLETED | OUTPATIENT
Start: 2019-03-02 | End: 2019-03-02

## 2019-03-02 RX ORDER — SODIUM CHLORIDE 9 MG/ML
1000 INJECTION INTRAMUSCULAR; INTRAVENOUS; SUBCUTANEOUS ONCE
Qty: 0 | Refills: 0 | Status: COMPLETED | OUTPATIENT
Start: 2019-03-02 | End: 2019-03-02

## 2019-03-02 RX ADMIN — FAMOTIDINE 100 MILLIGRAM(S): 10 INJECTION INTRAVENOUS at 23:38

## 2019-03-02 RX ADMIN — SODIUM CHLORIDE 2000 MILLILITER(S): 9 INJECTION INTRAMUSCULAR; INTRAVENOUS; SUBCUTANEOUS at 23:39

## 2019-03-02 RX ADMIN — ONDANSETRON 4 MILLIGRAM(S): 8 TABLET, FILM COATED ORAL at 23:38

## 2019-03-02 NOTE — ED PROVIDER NOTE - OBJECTIVE STATEMENT
Pertinent PMH/PSH/FHx/SHx and Review of Systems contained within:  32 y/o f G2PO about 14 weeks preg presents to ed c/o multiple episodes of vomiting all day today. No abdominal pain, vaginal bleeding or pelvic pain

## 2019-03-03 LAB
ALBUMIN SERPL ELPH-MCNC: 3.1 G/DL — LOW (ref 3.3–5)
ALP SERPL-CCNC: 37 U/L — LOW (ref 40–120)
ALT FLD-CCNC: 14 U/L DA — SIGNIFICANT CHANGE UP (ref 10–45)
APPEARANCE UR: CLEAR — SIGNIFICANT CHANGE UP
AST SERPL-CCNC: 42 U/L — HIGH (ref 10–40)
BASOPHILS # BLD AUTO: 0.1 K/UL — SIGNIFICANT CHANGE UP (ref 0–0.2)
BASOPHILS NFR BLD AUTO: 0.9 % — SIGNIFICANT CHANGE UP (ref 0–2)
BILIRUB SERPL-MCNC: 0.3 MG/DL — SIGNIFICANT CHANGE UP (ref 0.2–1.2)
BILIRUB UR-MCNC: NEGATIVE — SIGNIFICANT CHANGE UP
COLOR SPEC: YELLOW — SIGNIFICANT CHANGE UP
DIFF PNL FLD: NEGATIVE — SIGNIFICANT CHANGE UP
EOSINOPHIL # BLD AUTO: 0.1 K/UL — SIGNIFICANT CHANGE UP (ref 0–0.5)
EOSINOPHIL NFR BLD AUTO: 1.3 % — SIGNIFICANT CHANGE UP (ref 0–6)
GLUCOSE UR QL: NEGATIVE — SIGNIFICANT CHANGE UP
HCT VFR BLD CALC: 41.7 % — SIGNIFICANT CHANGE UP (ref 34.5–45)
HGB BLD-MCNC: 13.4 G/DL — SIGNIFICANT CHANGE UP (ref 11.5–15.5)
KETONES UR-MCNC: NEGATIVE — SIGNIFICANT CHANGE UP
LEUKOCYTE ESTERASE UR-ACNC: NEGATIVE — SIGNIFICANT CHANGE UP
LYMPHOCYTES # BLD AUTO: 29.2 % — SIGNIFICANT CHANGE UP (ref 13–44)
LYMPHOCYTES # BLD AUTO: 3 K/UL — SIGNIFICANT CHANGE UP (ref 1–3.3)
MCHC RBC-ENTMCNC: 29.9 PG — SIGNIFICANT CHANGE UP (ref 27–34)
MCHC RBC-ENTMCNC: 32.2 GM/DL — SIGNIFICANT CHANGE UP (ref 32–36)
MCV RBC AUTO: 92.8 FL — SIGNIFICANT CHANGE UP (ref 80–100)
MONOCYTES # BLD AUTO: 0.7 K/UL — SIGNIFICANT CHANGE UP (ref 0–0.9)
MONOCYTES NFR BLD AUTO: 7.1 % — SIGNIFICANT CHANGE UP (ref 1–9)
NEUTROPHILS # BLD AUTO: 6.2 K/UL — SIGNIFICANT CHANGE UP (ref 1.8–7.4)
NEUTROPHILS NFR BLD AUTO: 61.4 % — SIGNIFICANT CHANGE UP (ref 43–77)
NITRITE UR-MCNC: NEGATIVE — SIGNIFICANT CHANGE UP
PH UR: 7 — SIGNIFICANT CHANGE UP (ref 5–8)
PLATELET # BLD AUTO: 348 K/UL — SIGNIFICANT CHANGE UP (ref 150–400)
PROT SERPL-MCNC: 7.5 G/DL — SIGNIFICANT CHANGE UP (ref 6–8.3)
PROT UR-MCNC: NEGATIVE — SIGNIFICANT CHANGE UP
RBC # BLD: 4.49 M/UL — SIGNIFICANT CHANGE UP (ref 3.8–5.2)
RBC # FLD: 12 % — SIGNIFICANT CHANGE UP (ref 10.3–14.5)
SP GR SPEC: 1.01 — SIGNIFICANT CHANGE UP (ref 1.01–1.02)
UROBILINOGEN FLD QL: NEGATIVE — SIGNIFICANT CHANGE UP
WBC # BLD: 10.2 K/UL — SIGNIFICANT CHANGE UP (ref 3.8–10.5)
WBC # FLD AUTO: 10.2 K/UL — SIGNIFICANT CHANGE UP (ref 3.8–10.5)

## 2019-03-03 RX ADMIN — SODIUM CHLORIDE 1000 MILLILITER(S): 9 INJECTION INTRAMUSCULAR; INTRAVENOUS; SUBCUTANEOUS at 00:09

## 2019-03-03 RX ADMIN — FAMOTIDINE 20 MILLIGRAM(S): 10 INJECTION INTRAVENOUS at 00:08

## 2019-03-04 ENCOUNTER — APPOINTMENT (OUTPATIENT)
Dept: MATERNAL FETAL MEDICINE | Facility: CLINIC | Age: 34
End: 2019-03-04
Payer: MEDICAID

## 2019-03-04 VITALS
HEART RATE: 92 BPM | HEIGHT: 66 IN | WEIGHT: 119 LBS | BODY MASS INDEX: 19.13 KG/M2 | DIASTOLIC BLOOD PRESSURE: 70 MMHG | SYSTOLIC BLOOD PRESSURE: 98 MMHG

## 2019-03-04 DIAGNOSIS — O09.299 SUPERVISION OF PREGNANCY WITH OTHER POOR REPRODUCTIVE OR OBSTETRIC HISTORY, UNSPECIFIED TRIMESTER: ICD-10-CM

## 2019-03-04 DIAGNOSIS — Z87.59 PERSONAL HISTORY OF OTHER COMPLICATIONS OF PREGNANCY, CHILDBIRTH AND THE PUERPERIUM: ICD-10-CM

## 2019-03-04 DIAGNOSIS — Z87.51 PERSONAL HISTORY OF PRE-TERM LABOR: ICD-10-CM

## 2019-03-04 DIAGNOSIS — B97.7 PAPILLOMAVIRUS AS THE CAUSE OF DISEASES CLASSIFIED ELSEWHERE: ICD-10-CM

## 2019-03-04 DIAGNOSIS — F41.9 OTHER MENTAL DISORDERS COMPLICATING PREGNANCY, THIRD TRIMESTER: ICD-10-CM

## 2019-03-04 DIAGNOSIS — G43.909 MIGRAINE, UNSPECIFIED, NOT INTRACTABLE, W/OUT STATUS MIGRAINOSUS: ICD-10-CM

## 2019-03-04 DIAGNOSIS — Z83.3 FAMILY HISTORY OF DIABETES MELLITUS: ICD-10-CM

## 2019-03-04 DIAGNOSIS — O99.343 OTHER MENTAL DISORDERS COMPLICATING PREGNANCY, THIRD TRIMESTER: ICD-10-CM

## 2019-03-04 DIAGNOSIS — Z78.9 OTHER SPECIFIED HEALTH STATUS: ICD-10-CM

## 2019-03-04 DIAGNOSIS — Z82.49 FAMILY HISTORY OF ISCHEMIC HEART DISEASE AND OTHER DISEASES OF THE CIRCULATORY SYSTEM: ICD-10-CM

## 2019-03-04 PROCEDURE — 99203 OFFICE O/P NEW LOW 30 MIN: CPT

## 2019-03-04 RX ORDER — ASPIRIN 81 MG/1
81 TABLET, CHEWABLE ORAL DAILY
Refills: 0 | Status: ACTIVE | COMMUNITY
Start: 2019-03-04

## 2019-03-04 RX ORDER — MULTIVIT-MIN/FOLIC/VIT K/LYCOP 400-300MCG
28-0.8 TABLET ORAL DAILY
Refills: 0 | Status: ACTIVE | COMMUNITY
Start: 2019-03-04

## 2019-03-04 NOTE — DISCUSSION/SUMMARY
[FreeTextEntry1] : Patient is a 33-year-old  1010 being seen at  14 weeks for previous history of preeclampsia with  delivery. Her obstetrical history is significant for delivery in  of a live-born male  weighing 3 lbs. 10 oz. delivered at 31 weeks via  section for severe preeclampsia. Patient states that she was admitted approximately 2-3 days prior to delivery and was placed on medication and given  steroids prior to delivery. She had a routine postoperative course and denies antihypertensives in her postpartum period.\par \par Ultrascreen evaluation was performed on  which revealed low risk for fetal aneuploidy. Ultrasound was consistent with dates with no abnormalities noted and a normal nuchal translucency was seen. Vital signs today reveal blood pressure 90/70 and her weight is 119 pounds consistent with a BMI of 19.21KG.\par \par Management of this pregnancy was discussed. At this time the patient will continue on baby aspirin 81 mg p.o. daily until 36 weeks. The father of this pregnancy is a new father. Etiology of preeclampsia was discussed. Patient was advised that with any new father her risk of recurrent preeclampsia with elevated and we reviewed the utility of baby aspirin and its benefits in this clinical sitution. The patient understands that there is an increased risk for  delivery again with its inherent morbidity and mortality associated with this process. At this time routine prenatal care is recommended with followup as clinically indicated. She does complain of Hyperemesis as well as intermittent headaches,  but denies a history of migraine headaches. All of the above was discussed with the patient and the father of this pregnancy and all of their questions were answered.\par \par The patient's family history is significant for her mother having congestive heart failure, hypertensive disease and diabetes. Her past medical history is noncontributory. Her past surgical history significant for one  section. She has no known allergies to medications and denies alcohol, tobacco or drug use.\par \par Recommendations;\par \par #1. Baby aspirin 81 mg p.o. daily until 36 weeks.\par #2. Neurological evaluation for recurrent headaches.\par #3. Ultrasound at 20 weeks is recommended.\par #4. 3 hour glucose tolerance test between 24 and 28 weeks is recommended.\par #5. Follow maternal fetal medicine consultation as clinically indicated

## 2019-03-07 ENCOUNTER — APPOINTMENT (OUTPATIENT)
Dept: MATERNAL FETAL MEDICINE | Facility: CLINIC | Age: 34
End: 2019-03-07
Payer: MEDICAID

## 2019-03-07 ENCOUNTER — ASOB RESULT (OUTPATIENT)
Age: 34
End: 2019-03-07

## 2019-03-07 PROCEDURE — 99213 OFFICE O/P EST LOW 20 MIN: CPT

## 2019-03-18 ENCOUNTER — APPOINTMENT (OUTPATIENT)
Dept: MATERNAL FETAL MEDICINE | Facility: CLINIC | Age: 34
End: 2019-03-18

## 2019-03-25 LAB
1ST TRIMESTER DATA: NORMAL
2ND TRIMESTER DATA: NORMAL
AFP PNL SERPL: NORMAL
AFP SERPL-ACNC: NORMAL
AFP SERPL-ACNC: NORMAL
B-HCG FREE SERPL-MCNC: NORMAL
CLINICAL BIOCHEMIST REVIEW: NORMAL
FREE BETA HCG 1ST TRIMESTER: NORMAL
INHIBIN A SERPL-MCNC: NORMAL
NASAL BONE: PRESENT
NOTES NTD: NORMAL
NT: NORMAL
PAPP-A SERPL-ACNC: NORMAL
U ESTRIOL SERPL-SCNC: NORMAL

## 2019-04-15 ENCOUNTER — APPOINTMENT (OUTPATIENT)
Dept: MATERNAL FETAL MEDICINE | Facility: CLINIC | Age: 34
End: 2019-04-15
Payer: MEDICAID

## 2019-04-15 ENCOUNTER — ASOB RESULT (OUTPATIENT)
Age: 34
End: 2019-04-15

## 2019-04-15 ENCOUNTER — APPOINTMENT (OUTPATIENT)
Dept: ANTEPARTUM | Facility: CLINIC | Age: 34
End: 2019-04-15
Payer: MEDICAID

## 2019-04-15 PROCEDURE — 76811 OB US DETAILED SNGL FETUS: CPT

## 2019-04-15 PROCEDURE — 99214 OFFICE O/P EST MOD 30 MIN: CPT

## 2019-04-15 PROCEDURE — 99213 OFFICE O/P EST LOW 20 MIN: CPT | Mod: 25

## 2019-07-08 ENCOUNTER — ASOB RESULT (OUTPATIENT)
Age: 34
End: 2019-07-08

## 2019-07-08 ENCOUNTER — APPOINTMENT (OUTPATIENT)
Dept: ANTEPARTUM | Facility: CLINIC | Age: 34
End: 2019-07-08
Payer: MEDICAID

## 2019-07-08 PROCEDURE — 76816 OB US FOLLOW-UP PER FETUS: CPT

## 2019-07-08 PROCEDURE — 76819 FETAL BIOPHYS PROFIL W/O NST: CPT

## 2019-08-15 ENCOUNTER — APPOINTMENT (OUTPATIENT)
Dept: ANTEPARTUM | Facility: CLINIC | Age: 34
End: 2019-08-15
Payer: MEDICAID

## 2019-08-15 ENCOUNTER — ASOB RESULT (OUTPATIENT)
Age: 34
End: 2019-08-15

## 2019-08-15 PROCEDURE — 76819 FETAL BIOPHYS PROFIL W/O NST: CPT

## 2019-08-15 PROCEDURE — 76816 OB US FOLLOW-UP PER FETUS: CPT

## 2019-08-29 ENCOUNTER — TRANSCRIPTION ENCOUNTER (OUTPATIENT)
Age: 34
End: 2019-08-29

## 2019-09-04 ENCOUNTER — INPATIENT (INPATIENT)
Facility: HOSPITAL | Age: 34
LOS: 2 days | Discharge: HOME | DRG: 560 | End: 2019-09-07
Attending: OBSTETRICS & GYNECOLOGY | Admitting: OBSTETRICS & GYNECOLOGY
Payer: MEDICAID

## 2019-09-04 VITALS — WEIGHT: 171.96 LBS | HEIGHT: 66 IN

## 2019-09-04 DIAGNOSIS — Z98.891 HISTORY OF UTERINE SCAR FROM PREVIOUS SURGERY: Chronic | ICD-10-CM

## 2019-09-04 DIAGNOSIS — Z3A.00 WEEKS OF GESTATION OF PREGNANCY NOT SPECIFIED: ICD-10-CM

## 2019-09-04 LAB
APPEARANCE UR: CLEAR — SIGNIFICANT CHANGE UP
BASOPHILS # BLD AUTO: 0.03 K/UL — SIGNIFICANT CHANGE UP (ref 0–0.2)
BASOPHILS NFR BLD AUTO: 0.2 % — SIGNIFICANT CHANGE UP (ref 0–2)
BILIRUB UR-MCNC: NEGATIVE — SIGNIFICANT CHANGE UP
COLOR SPEC: YELLOW — SIGNIFICANT CHANGE UP
DIFF PNL FLD: NEGATIVE — SIGNIFICANT CHANGE UP
EOSINOPHIL # BLD AUTO: 0.03 K/UL — SIGNIFICANT CHANGE UP (ref 0–0.5)
EOSINOPHIL NFR BLD AUTO: 0.2 % — SIGNIFICANT CHANGE UP (ref 0–6)
GLUCOSE UR QL: NEGATIVE MG/DL — SIGNIFICANT CHANGE UP
HCT VFR BLD CALC: 39.6 % — SIGNIFICANT CHANGE UP (ref 34.5–45)
HGB BLD-MCNC: 12.5 G/DL — SIGNIFICANT CHANGE UP (ref 11.5–15.5)
IMM GRANULOCYTES NFR BLD AUTO: 0.7 % — SIGNIFICANT CHANGE UP (ref 0–1.5)
KETONES UR-MCNC: ABNORMAL
LEUKOCYTE ESTERASE UR-ACNC: NEGATIVE — SIGNIFICANT CHANGE UP
LYMPHOCYTES # BLD AUTO: 1.62 K/UL — SIGNIFICANT CHANGE UP (ref 1–3.3)
LYMPHOCYTES # BLD AUTO: 12.2 % — LOW (ref 13–44)
MCHC RBC-ENTMCNC: 28 PG — SIGNIFICANT CHANGE UP (ref 27–34)
MCHC RBC-ENTMCNC: 31.6 GM/DL — LOW (ref 32–36)
MCV RBC AUTO: 88.8 FL — SIGNIFICANT CHANGE UP (ref 80–100)
MONOCYTES # BLD AUTO: 0.94 K/UL — HIGH (ref 0–0.9)
MONOCYTES NFR BLD AUTO: 7.1 % — SIGNIFICANT CHANGE UP (ref 2–14)
NEUTROPHILS # BLD AUTO: 10.52 K/UL — HIGH (ref 1.8–7.4)
NEUTROPHILS NFR BLD AUTO: 79.6 % — HIGH (ref 43–77)
NITRITE UR-MCNC: NEGATIVE — SIGNIFICANT CHANGE UP
PH UR: 6.5 — SIGNIFICANT CHANGE UP (ref 5–8)
PLATELET # BLD AUTO: 259 K/UL — SIGNIFICANT CHANGE UP (ref 150–400)
PROT UR-MCNC: 15 MG/DL
RBC # BLD: 4.46 M/UL — SIGNIFICANT CHANGE UP (ref 3.8–5.2)
RBC # FLD: 15.1 % — HIGH (ref 10.3–14.5)
SP GR SPEC: 1.01 — SIGNIFICANT CHANGE UP (ref 1.01–1.02)
UROBILINOGEN FLD QL: NEGATIVE MG/DL — SIGNIFICANT CHANGE UP
WBC # BLD: 13.23 K/UL — HIGH (ref 3.8–10.5)
WBC # FLD AUTO: 13.23 K/UL — HIGH (ref 3.8–10.5)

## 2019-09-04 PROCEDURE — 85025 COMPLETE CBC W/AUTO DIFF WBC: CPT

## 2019-09-04 PROCEDURE — 90715 TDAP VACCINE 7 YRS/> IM: CPT

## 2019-09-04 PROCEDURE — 86850 RBC ANTIBODY SCREEN: CPT

## 2019-09-04 PROCEDURE — 85014 HEMATOCRIT: CPT

## 2019-09-04 PROCEDURE — 94760 N-INVAS EAR/PLS OXIMETRY 1: CPT

## 2019-09-04 PROCEDURE — 59050 FETAL MONITOR W/REPORT: CPT

## 2019-09-04 PROCEDURE — 86901 BLOOD TYPING SEROLOGIC RH(D): CPT

## 2019-09-04 PROCEDURE — 81001 URINALYSIS AUTO W/SCOPE: CPT

## 2019-09-04 PROCEDURE — G0463: CPT

## 2019-09-04 PROCEDURE — 85018 HEMOGLOBIN: CPT

## 2019-09-04 PROCEDURE — 86778 TOXOPLASMA ANTIBODY IGM: CPT

## 2019-09-04 PROCEDURE — 36415 COLL VENOUS BLD VENIPUNCTURE: CPT

## 2019-09-04 PROCEDURE — 86900 BLOOD TYPING SEROLOGIC ABO: CPT

## 2019-09-04 PROCEDURE — 86780 TREPONEMA PALLIDUM: CPT

## 2019-09-04 RX ORDER — AMPICILLIN TRIHYDRATE 250 MG
1 CAPSULE ORAL EVERY 4 HOURS
Refills: 0 | Status: DISCONTINUED | OUTPATIENT
Start: 2019-09-04 | End: 2019-09-05

## 2019-09-04 RX ORDER — AMPICILLIN TRIHYDRATE 250 MG
2 CAPSULE ORAL ONCE
Refills: 0 | Status: COMPLETED | OUTPATIENT
Start: 2019-09-04 | End: 2019-09-04

## 2019-09-04 RX ORDER — INFLUENZA VIRUS VACCINE 15; 15; 15; 15 UG/.5ML; UG/.5ML; UG/.5ML; UG/.5ML
0.5 SUSPENSION INTRAMUSCULAR ONCE
Refills: 0 | Status: DISCONTINUED | OUTPATIENT
Start: 2019-09-04 | End: 2019-09-07

## 2019-09-04 RX ORDER — BUTORPHANOL TARTRATE 2 MG/ML
2 INJECTION, SOLUTION INTRAMUSCULAR; INTRAVENOUS ONCE
Refills: 0 | Status: DISCONTINUED | OUTPATIENT
Start: 2019-09-04 | End: 2019-09-04

## 2019-09-04 RX ORDER — OXYTOCIN 10 UNIT/ML
333.33 VIAL (ML) INJECTION
Qty: 20 | Refills: 0 | Status: DISCONTINUED | OUTPATIENT
Start: 2019-09-04 | End: 2019-09-07

## 2019-09-04 RX ORDER — SODIUM CHLORIDE 9 MG/ML
1000 INJECTION, SOLUTION INTRAVENOUS
Refills: 0 | Status: DISCONTINUED | OUTPATIENT
Start: 2019-09-04 | End: 2019-09-05

## 2019-09-04 RX ORDER — CITRIC ACID/SODIUM CITRATE 300-500 MG
30 SOLUTION, ORAL ORAL ONCE
Refills: 0 | Status: COMPLETED | OUTPATIENT
Start: 2019-09-04 | End: 2019-09-04

## 2019-09-04 RX ADMIN — BUTORPHANOL TARTRATE 2 MILLIGRAM(S): 2 INJECTION, SOLUTION INTRAMUSCULAR; INTRAVENOUS at 21:33

## 2019-09-04 RX ADMIN — Medication 30 MILLILITER(S): at 21:20

## 2019-09-04 RX ADMIN — BUTORPHANOL TARTRATE 2 MILLIGRAM(S): 2 INJECTION, SOLUTION INTRAMUSCULAR; INTRAVENOUS at 22:15

## 2019-09-04 RX ADMIN — SODIUM CHLORIDE 125 MILLILITER(S): 9 INJECTION, SOLUTION INTRAVENOUS at 21:21

## 2019-09-04 RX ADMIN — Medication 216 GRAM(S): at 21:52

## 2019-09-04 NOTE — PATIENT PROFILE OB - BREASTFEEDING PROVIDES STABLE TEMPERATURE THROUGH SKIN TO SKIN CONTACT
Eyes with no visual disturbances.  Ears clean and dry and no hearing difficulties. Nose with pink mucosa and no drainage.  Mouth mucous membranes moist and pink.  No tenderness or swelling to throat or neck. Statement Selected

## 2019-09-04 NOTE — PATIENT PROFILE OB - NS PRO TDAP RECEIVED Y/N
Care of patient assumed.  Patient resting comfortably. Updated on plan of care. Refer to flow sheet or progress note for assessment and vital signs. Call bell in reach/patient voiced that she would call PRN. Bed locked and in lowest position, side rail up X1. Patient in no acute distress. Awaiting additional orders. Will continue to monitor/cardiac monitoring continued.     
Discharge instructions and rx x1 given, patient voiced understanding.  Discharged to home in stable condition, ambulatory out of ER w steady gait, NAD.    
no...

## 2019-09-05 LAB — T PALLIDUM AB TITR SER: NEGATIVE — SIGNIFICANT CHANGE UP

## 2019-09-05 RX ORDER — SODIUM CHLORIDE 9 MG/ML
3 INJECTION INTRAMUSCULAR; INTRAVENOUS; SUBCUTANEOUS EVERY 8 HOURS
Refills: 0 | Status: DISCONTINUED | OUTPATIENT
Start: 2019-09-05 | End: 2019-09-07

## 2019-09-05 RX ORDER — TETANUS TOXOID, REDUCED DIPHTHERIA TOXOID AND ACELLULAR PERTUSSIS VACCINE, ADSORBED 5; 2.5; 8; 8; 2.5 [IU]/.5ML; [IU]/.5ML; UG/.5ML; UG/.5ML; UG/.5ML
0.5 SUSPENSION INTRAMUSCULAR ONCE
Refills: 0 | Status: COMPLETED | OUTPATIENT
Start: 2019-09-05

## 2019-09-05 RX ORDER — OXYTOCIN 10 UNIT/ML
2 VIAL (ML) INJECTION
Qty: 30 | Refills: 0 | Status: DISCONTINUED | OUTPATIENT
Start: 2019-09-05 | End: 2019-09-07

## 2019-09-05 RX ORDER — DIPHENHYDRAMINE HCL 50 MG
25 CAPSULE ORAL EVERY 6 HOURS
Refills: 0 | Status: DISCONTINUED | OUTPATIENT
Start: 2019-09-05 | End: 2019-09-07

## 2019-09-05 RX ORDER — ONDANSETRON 8 MG/1
4 TABLET, FILM COATED ORAL ONCE
Refills: 0 | Status: COMPLETED | OUTPATIENT
Start: 2019-09-05 | End: 2019-09-05

## 2019-09-05 RX ORDER — GLYCERIN ADULT
1 SUPPOSITORY, RECTAL RECTAL AT BEDTIME
Refills: 0 | Status: DISCONTINUED | OUTPATIENT
Start: 2019-09-05 | End: 2019-09-07

## 2019-09-05 RX ORDER — KETOROLAC TROMETHAMINE 30 MG/ML
30 SYRINGE (ML) INJECTION ONCE
Refills: 0 | Status: DISCONTINUED | OUTPATIENT
Start: 2019-09-05 | End: 2019-09-05

## 2019-09-05 RX ORDER — ACETAMINOPHEN 500 MG
1000 TABLET ORAL ONCE
Refills: 0 | Status: COMPLETED | OUTPATIENT
Start: 2019-09-05 | End: 2019-09-05

## 2019-09-05 RX ORDER — OXYCODONE HYDROCHLORIDE 5 MG/1
5 TABLET ORAL ONCE
Refills: 0 | Status: DISCONTINUED | OUTPATIENT
Start: 2019-09-05 | End: 2019-09-07

## 2019-09-05 RX ORDER — ACETAMINOPHEN 500 MG
975 TABLET ORAL
Refills: 0 | Status: DISCONTINUED | OUTPATIENT
Start: 2019-09-05 | End: 2019-09-07

## 2019-09-05 RX ORDER — DOCUSATE SODIUM 100 MG
100 CAPSULE ORAL
Refills: 0 | Status: DISCONTINUED | OUTPATIENT
Start: 2019-09-05 | End: 2019-09-07

## 2019-09-05 RX ORDER — BENZOCAINE 10 %
1 GEL (GRAM) MUCOUS MEMBRANE EVERY 6 HOURS
Refills: 0 | Status: DISCONTINUED | OUTPATIENT
Start: 2019-09-05 | End: 2019-09-07

## 2019-09-05 RX ORDER — HYDROCORTISONE 1 %
1 OINTMENT (GRAM) TOPICAL EVERY 6 HOURS
Refills: 0 | Status: DISCONTINUED | OUTPATIENT
Start: 2019-09-05 | End: 2019-09-07

## 2019-09-05 RX ORDER — LANOLIN
1 OINTMENT (GRAM) TOPICAL EVERY 6 HOURS
Refills: 0 | Status: DISCONTINUED | OUTPATIENT
Start: 2019-09-05 | End: 2019-09-07

## 2019-09-05 RX ORDER — OXYTOCIN 10 UNIT/ML
333.33 VIAL (ML) INJECTION
Qty: 20 | Refills: 0 | Status: DISCONTINUED | OUTPATIENT
Start: 2019-09-05 | End: 2019-09-07

## 2019-09-05 RX ORDER — MAGNESIUM HYDROXIDE 400 MG/1
30 TABLET, CHEWABLE ORAL
Refills: 0 | Status: DISCONTINUED | OUTPATIENT
Start: 2019-09-05 | End: 2019-09-07

## 2019-09-05 RX ORDER — PRAMOXINE HYDROCHLORIDE 150 MG/15G
1 AEROSOL, FOAM RECTAL EVERY 4 HOURS
Refills: 0 | Status: DISCONTINUED | OUTPATIENT
Start: 2019-09-05 | End: 2019-09-07

## 2019-09-05 RX ORDER — OXYCODONE HYDROCHLORIDE 5 MG/1
5 TABLET ORAL
Refills: 0 | Status: DISCONTINUED | OUTPATIENT
Start: 2019-09-05 | End: 2019-09-07

## 2019-09-05 RX ORDER — DIBUCAINE 1 %
1 OINTMENT (GRAM) RECTAL EVERY 6 HOURS
Refills: 0 | Status: DISCONTINUED | OUTPATIENT
Start: 2019-09-05 | End: 2019-09-07

## 2019-09-05 RX ORDER — IBUPROFEN 200 MG
600 TABLET ORAL EVERY 6 HOURS
Refills: 0 | Status: COMPLETED | OUTPATIENT
Start: 2019-09-05 | End: 2020-08-03

## 2019-09-05 RX ORDER — AER TRAVELER 0.5 G/1
1 SOLUTION RECTAL; TOPICAL EVERY 4 HOURS
Refills: 0 | Status: DISCONTINUED | OUTPATIENT
Start: 2019-09-05 | End: 2019-09-07

## 2019-09-05 RX ORDER — SIMETHICONE 80 MG/1
80 TABLET, CHEWABLE ORAL EVERY 4 HOURS
Refills: 0 | Status: DISCONTINUED | OUTPATIENT
Start: 2019-09-05 | End: 2019-09-07

## 2019-09-05 RX ADMIN — Medication 30 MILLIGRAM(S): at 20:00

## 2019-09-05 RX ADMIN — Medication 400 MILLIGRAM(S): at 20:40

## 2019-09-05 RX ADMIN — ONDANSETRON 4 MILLIGRAM(S): 8 TABLET, FILM COATED ORAL at 06:18

## 2019-09-05 RX ADMIN — Medication 108 GRAM(S): at 02:02

## 2019-09-05 RX ADMIN — Medication 108 GRAM(S): at 09:59

## 2019-09-05 RX ADMIN — Medication 108 GRAM(S): at 17:50

## 2019-09-05 RX ADMIN — SODIUM CHLORIDE 125 MILLILITER(S): 9 INJECTION, SOLUTION INTRAVENOUS at 09:17

## 2019-09-05 RX ADMIN — Medication 108 GRAM(S): at 06:14

## 2019-09-05 RX ADMIN — Medication 1000 MILLIGRAM(S): at 21:52

## 2019-09-05 RX ADMIN — SODIUM CHLORIDE 125 MILLILITER(S): 9 INJECTION, SOLUTION INTRAVENOUS at 18:59

## 2019-09-05 RX ADMIN — Medication 2 MILLIUNIT(S)/MIN: at 12:23

## 2019-09-05 RX ADMIN — Medication 108 GRAM(S): at 14:04

## 2019-09-05 RX ADMIN — Medication 1000 MILLIUNIT(S)/MIN: at 20:15

## 2019-09-06 ENCOUNTER — TRANSCRIPTION ENCOUNTER (OUTPATIENT)
Age: 34
End: 2019-09-06

## 2019-09-06 LAB
HCT VFR BLD CALC: 33.4 % — LOW (ref 34.5–45)
HGB BLD-MCNC: 10.8 G/DL — LOW (ref 11.5–15.5)
T GONDII IGM SER QL: 3 AU/ML — SIGNIFICANT CHANGE UP
T GONDII IGM SER QL: NEGATIVE — SIGNIFICANT CHANGE UP

## 2019-09-06 RX ORDER — AER TRAVELER 0.5 G/1
1 SOLUTION RECTAL; TOPICAL
Qty: 0 | Refills: 0 | DISCHARGE
Start: 2019-09-06

## 2019-09-06 RX ORDER — DIBUCAINE 1 %
1 OINTMENT (GRAM) RECTAL
Qty: 0 | Refills: 0 | DISCHARGE
Start: 2019-09-06

## 2019-09-06 RX ORDER — IBUPROFEN 200 MG
600 TABLET ORAL EVERY 6 HOURS
Refills: 0 | Status: DISCONTINUED | OUTPATIENT
Start: 2019-09-06 | End: 2019-09-07

## 2019-09-06 RX ORDER — SIMETHICONE 80 MG/1
1 TABLET, CHEWABLE ORAL
Qty: 0 | Refills: 0 | DISCHARGE
Start: 2019-09-06

## 2019-09-06 RX ORDER — PRAMOXINE HYDROCHLORIDE 150 MG/15G
1 AEROSOL, FOAM RECTAL
Qty: 0 | Refills: 0 | DISCHARGE
Start: 2019-09-06

## 2019-09-06 RX ORDER — ACETAMINOPHEN 500 MG
3 TABLET ORAL
Qty: 0 | Refills: 0 | DISCHARGE
Start: 2019-09-06

## 2019-09-06 RX ADMIN — Medication 600 MILLIGRAM(S): at 06:31

## 2019-09-06 RX ADMIN — Medication 975 MILLIGRAM(S): at 04:25

## 2019-09-06 RX ADMIN — Medication 975 MILLIGRAM(S): at 23:45

## 2019-09-06 RX ADMIN — Medication 600 MILLIGRAM(S): at 12:06

## 2019-09-06 RX ADMIN — Medication 1 TABLET(S): at 12:06

## 2019-09-06 RX ADMIN — Medication 600 MILLIGRAM(S): at 22:00

## 2019-09-06 RX ADMIN — Medication 975 MILLIGRAM(S): at 09:46

## 2019-09-06 RX ADMIN — Medication 975 MILLIGRAM(S): at 03:28

## 2019-09-06 RX ADMIN — Medication 600 MILLIGRAM(S): at 22:30

## 2019-09-06 RX ADMIN — Medication 975 MILLIGRAM(S): at 17:50

## 2019-09-06 NOTE — DISCHARGE NOTE OB - SWOLLEN AREA ON THE LEG THAT IS PAINFUL, RED OR HOT
Statement Selected V-Y Flap Text: The defect edges were debeveled with a #15 scalpel blade.  Given the location of the defect, shape of the defect and the proximity to free margins a V-Y flap was deemed most appropriate.  Using a sterile surgical marker, an appropriate advancement flap was drawn incorporating the defect and placing the expected incisions within the relaxed skin tension lines where possible.    The area thus outlined was incised deep to adipose tissue with a #15 scalpel blade.  The skin margins were undermined to an appropriate distance in all directions utilizing iris scissors.

## 2019-09-06 NOTE — DISCHARGE NOTE OB - CARE PROVIDER_API CALL
Eve Munoz)  Obstetrics and Gynecology  [address] [facility] [address], [address]  [place] [address]  Phone: [phone]  Fax: [phone]  Follow Up Time:

## 2019-09-06 NOTE — DISCHARGE NOTE OB - PATIENT PORTAL LINK FT
You can access the FollowMyHealth Patient Portal offered by [facility] by registering at the following website: [email or web address]. By joining [employer]’s FollowMyHealth portal, you will also be able to view your health information using other applications (apps) compatible with our system.

## 2019-09-06 NOTE — PROGRESS NOTE ADULT - SUBJECTIVE AND OBJECTIVE BOX
Patient is a 32yo  now PPD#1 s/p spontaneous vaginal delivery    S:    No acute events overnight.  Pt seen and examined at bedside. Pt doing well.  Has no complaints.  Pain well controlled on current regiment.  Pt ambulating, tolerating PO, voiding w/o difficulty, +flatus/-BM.    O:    T(C): 36.9 (19 @ 06:00), Max: 37.2 (19 @ 02:01)  HR: 83 (19 @ 06:00) (83 - 111)  BP: 97/56 (19 @ 06:00) (97/56 - 135/76)  RR: 16 (19 @ 06:00) (16 - 18)  SpO2: 97% (19 @ 06:00) (96% - 99%)    Physical Exam  Breast: NT, non-engorged  Abdomen:  soft, NT, ND, BS+  Uterus:  firm below umbilicus  VE:  expectant lochia  Ext:  NT, nonedematous                          12.5   13.23 )-----------( 259      ( 04 Sep 2019 21:14 )             39.6

## 2019-09-06 NOTE — DISCHARGE NOTE OB - HOSPITAL COURSE
s/p vaginal delivery after c/s   both mom and infant remains in a stable condition during the course of stay

## 2019-09-06 NOTE — DISCHARGE NOTE OB - MEDICATION SUMMARY - MEDICATIONS TO TAKE
I will START or STAY ON the medications listed below when I get home from the hospital:     mg oral tablet  -- 1 tab(s) by mouth every 6 hours MDD:4 tabs  -- Do not take this drug if you are pregnant.  It is very important that you take or use this exactly as directed.  Do not skip doses or discontinue unless directed by your doctor.  May cause drowsiness or dizziness.  Obtain medical advice before taking any non-prescription drugs as some may affect the action of this medication.  Take with food or milk.    -- Indication: For vaginal delivery    acetaminophen 325 mg oral tablet  -- 3 tab(s) by mouth   -- Indication: For vaginal delivery     dibucaine 1% topical ointment  -- 1 application on skin every 6 hours, As needed, Perineal discomfort  -- Indication: For vaginal delivery     witch hazel 50% topical pad  -- 1 application on skin every 4 hours, As needed, Perineal discomfort  -- Indication: For vaginal delivery     pramoxine 1% topical cream  -- 1 application on skin every 4 hours, As needed, Moderate Pain (4-6)  -- Indication: For vaginal delivery     Prenatal Multivitamins with Folic Acid 1 mg oral tablet  -- 1 tab(s) by mouth once a day  -- Indication: For vaginal delivery     simethicone 80 mg oral tablet, chewable  -- 1 tab(s) by mouth every 4 hours, As needed, Gas  -- Indication: For vaginal delivery

## 2019-09-06 NOTE — DISCHARGE NOTE OB - CARE PLAN
Principal Discharge DX:	, delivered  Goal:	postpartum care  Assessment and plan of treatment:	pelvic rest a nd f/up in 2 wks

## 2019-09-06 NOTE — PROGRESS NOTE ADULT - SUBJECTIVE AND OBJECTIVE BOX
pt is s/p TOLAC   doing well    no complaints   on exam vss stable   breast nml b/l    abd is soft and uterus is firm   extremities nml   a/p   routine postpartum care   d/c home tomorrow    arnulfo garcia md

## 2019-09-06 NOTE — PROGRESS NOTE ADULT - ASSESSMENT
A/P:  Patient is a 32yo  now PPD#1 s/p spontaneous vaginal delivery  -Stable  -Voiding, tolerating PO, bowel function nml   -Advance care as tolerated   -Continue routine postpartum/postoperative care and education.  -Pt encouraged to increase ambulation and PO intake  -D/C day 2 if meeting all expected milestones

## 2019-09-07 VITALS
OXYGEN SATURATION: 98 % | SYSTOLIC BLOOD PRESSURE: 104 MMHG | HEART RATE: 77 BPM | DIASTOLIC BLOOD PRESSURE: 80 MMHG | RESPIRATION RATE: 16 BRPM | TEMPERATURE: 98 F

## 2019-09-07 RX ORDER — TETANUS TOXOID, REDUCED DIPHTHERIA TOXOID AND ACELLULAR PERTUSSIS VACCINE, ADSORBED 5; 2.5; 8; 8; 2.5 [IU]/.5ML; [IU]/.5ML; UG/.5ML; UG/.5ML; UG/.5ML
0.5 SUSPENSION INTRAMUSCULAR ONCE
Refills: 0 | Status: COMPLETED | OUTPATIENT
Start: 2019-09-07 | End: 2019-09-07

## 2019-09-07 RX ADMIN — TETANUS TOXOID, REDUCED DIPHTHERIA TOXOID AND ACELLULAR PERTUSSIS VACCINE, ADSORBED 0.5 MILLILITER(S): 5; 2.5; 8; 8; 2.5 SUSPENSION INTRAMUSCULAR at 06:53

## 2019-09-07 RX ADMIN — Medication 975 MILLIGRAM(S): at 06:53

## 2019-09-07 RX ADMIN — Medication 600 MILLIGRAM(S): at 04:03

## 2019-09-07 RX ADMIN — Medication 600 MILLIGRAM(S): at 09:31

## 2019-09-07 RX ADMIN — Medication 975 MILLIGRAM(S): at 00:30

## 2019-09-07 RX ADMIN — Medication 600 MILLIGRAM(S): at 04:30

## 2019-09-07 RX ADMIN — Medication 975 MILLIGRAM(S): at 07:15

## 2019-09-07 NOTE — PROGRESS NOTE ADULT - ASSESSMENT
Patient is a 33y woman PPD# 2    Plan:  Follow up with OB gyn in 1 week  Increase H20 for urinary urgency  Routine perineal care bottle

## 2019-09-07 NOTE — PROGRESS NOTE ADULT - SUBJECTIVE AND OBJECTIVE BOX
Patient is a 33y woman PPD# 2    Subjective:  -  No acute overnight events.   - She feels well, pain is well controlled.   - She is ambulating and tolerating a regular diet.   - +Flatus, +BM. Patient is voiding without difficulty but admits urgency.  - She denies nausea/vomiting, breathing problems, headache and visual changes.  - Lochia wnl.  - Breastfeeding without difficulty.    Vital Signs Last 24 Hrs  T(C): 36.4 (07 Sep 2019 08:15), Max: 36.7 (06 Sep 2019 20:37)  T(F): 97.6 (07 Sep 2019 08:15), Max: 98 (06 Sep 2019 20:37)  HR: 77 (07 Sep 2019 08:15) (77 - 103)  BP: 104/80 (07 Sep 2019 08:15) (97/65 - 114/60)  BP(mean): --  RR: 16 (07 Sep 2019 08:15) (16 - 16)  SpO2: 98% (07 Sep 2019 08:15) (98% - 100%)    Physical exam:  General: NAD. Appears well.  Cardio: RRR  Pulm: CTABL, no increased work of breathing   Breast: not engorged, no erythema   Abdomen: soft, nontender, nondistended, firm uterine fundus.  Pelvic: Normal lochia noted.  Ext: No DVT signs, warm extremities, no edema.    Allergies    latex (Unknown)  No Known Drug Allergies    Intolerances    Reglan (Unknown)      LABS:                        10.8   x     )-----------( x        ( 06 Sep 2019 07:15 )             33.4

## 2019-09-10 DIAGNOSIS — R39.15 URGENCY OF URINATION: ICD-10-CM

## 2019-09-10 DIAGNOSIS — Z3A.40 40 WEEKS GESTATION OF PREGNANCY: ICD-10-CM

## 2019-09-10 DIAGNOSIS — O34.211 MATERNAL CARE FOR LOW TRANSVERSE SCAR FROM PREVIOUS CESAREAN DELIVERY: ICD-10-CM

## 2020-06-25 ENCOUNTER — EMERGENCY (EMERGENCY)
Facility: HOSPITAL | Age: 35
LOS: 1 days | Discharge: HOME | End: 2020-06-25
Attending: EMERGENCY MEDICINE | Admitting: EMERGENCY MEDICINE
Payer: MEDICAID

## 2020-06-25 VITALS
RESPIRATION RATE: 14 BRPM | SYSTOLIC BLOOD PRESSURE: 112 MMHG | DIASTOLIC BLOOD PRESSURE: 74 MMHG | HEIGHT: 66 IN | WEIGHT: 137.57 LBS | HEART RATE: 79 BPM | OXYGEN SATURATION: 99 % | TEMPERATURE: 98 F

## 2020-06-25 DIAGNOSIS — Z98.891 HISTORY OF UTERINE SCAR FROM PREVIOUS SURGERY: Chronic | ICD-10-CM

## 2020-06-25 LAB
APPEARANCE UR: CLEAR — SIGNIFICANT CHANGE UP
BACTERIA # UR AUTO: ABNORMAL /HPF
BILIRUB UR-MCNC: NEGATIVE — SIGNIFICANT CHANGE UP
COLOR SPEC: YELLOW — SIGNIFICANT CHANGE UP
COMMENT - URINE: SIGNIFICANT CHANGE UP
DIFF PNL FLD: NEGATIVE — SIGNIFICANT CHANGE UP
EPI CELLS # UR: SIGNIFICANT CHANGE UP
GLUCOSE UR QL: NEGATIVE — SIGNIFICANT CHANGE UP
KETONES UR-MCNC: ABNORMAL
LEUKOCYTE ESTERASE UR-ACNC: NEGATIVE — SIGNIFICANT CHANGE UP
NITRITE UR-MCNC: NEGATIVE — SIGNIFICANT CHANGE UP
PH UR: 6.5 — SIGNIFICANT CHANGE UP (ref 5–8)
PROT UR-MCNC: 15
RBC CASTS # UR COMP ASSIST: SIGNIFICANT CHANGE UP /HPF (ref 0–4)
SP GR SPEC: 1.01 — SIGNIFICANT CHANGE UP (ref 1.01–1.02)
UROBILINOGEN FLD QL: 1
WBC UR QL: ABNORMAL /HPF (ref 0–5)

## 2020-06-25 PROCEDURE — 87086 URINE CULTURE/COLONY COUNT: CPT

## 2020-06-25 PROCEDURE — 81001 URINALYSIS AUTO W/SCOPE: CPT

## 2020-06-25 PROCEDURE — 81025 URINE PREGNANCY TEST: CPT

## 2020-06-25 PROCEDURE — 99283 EMERGENCY DEPT VISIT LOW MDM: CPT

## 2020-06-25 PROCEDURE — 99284 EMERGENCY DEPT VISIT MOD MDM: CPT

## 2020-06-25 RX ORDER — CEPHALEXIN 500 MG
1 CAPSULE ORAL
Qty: 14 | Refills: 0
Start: 2020-06-25 | End: 2020-07-01

## 2020-06-25 RX ORDER — PHENAZOPYRIDINE HCL 100 MG
1 TABLET ORAL
Qty: 6 | Refills: 0
Start: 2020-06-25 | End: 2020-06-26

## 2020-06-25 RX ORDER — CEPHALEXIN 500 MG
500 CAPSULE ORAL ONCE
Refills: 0 | Status: COMPLETED | OUTPATIENT
Start: 2020-06-25 | End: 2020-06-25

## 2020-06-25 RX ORDER — PHENAZOPYRIDINE HCL 100 MG
200 TABLET ORAL ONCE
Refills: 0 | Status: COMPLETED | OUTPATIENT
Start: 2020-06-25 | End: 2020-06-25

## 2020-06-25 RX ADMIN — Medication 200 MILLIGRAM(S): at 22:30

## 2020-06-25 RX ADMIN — Medication 500 MILLIGRAM(S): at 22:29

## 2020-06-25 NOTE — ED PROVIDER NOTE - PATIENT PORTAL LINK FT
You can access the FollowMyHealth Patient Portal offered by [facility] by registering at the following website: [email or web address]. By joining [employer]’s FollowMyHealth portal, you will also be able to view your health information using other applications (apps) compatible with our system.

## 2020-06-25 NOTE — ED PROVIDER NOTE - OBJECTIVE STATEMENT
Pt is 35 y/o female with PMhx of kidney stones and frequent UTIs here for eval of dysuria, urinary frequency, urgency and lower back as well as suprapubic pressure x 3 days - the above sx pt has experienced in the past and was diagnosed with UTI. Pt denies fever, chills, denies nausea, vomiting, denies hematuria,. vaginal bleeding or discharge, denies pelvic pain; sexually active in monogamous relationship, denies hx of STDs; LMP - 1 week ago;   pt denies ext weakness or numbness, denies saddle anesthesia, urinary retention,  chronic steroids use, IVDU, denies hx of malignancy, denies hx of spinal surgeries or epidural injections;

## 2020-06-25 NOTE — ED PROVIDER NOTE - CLINICAL SUMMARY MEDICAL DECISION MAKING FREE TEXT BOX
UTI sx x 3 days, pt well appearing afebrile, abd soft, nt/nd, normoactive bs, no CVAT b/l, sx similar to ones pt experienced withy UTI in the past - will tx with keflex, ua cx UTI sx x 3 days, pt well appearing afebrile, abd soft, nt/nd, normoactive bs, no CVAT b/l, sx similar to ones pt experienced withy UTI in the past - will tx with keflex, ua cx sent; Vásquez: UTI sx x 3 days, pt well appearing afebrile, abd soft, nt/nd, normoactive bs, no CVAT b/l, sx similar to ones pt experienced withy UTI in the past - will tx with keflex, ua cx sent;

## 2020-06-25 NOTE — ED PROVIDER NOTE - NSFOLLOWUPINSTRUCTIONS_ED_ALL_ED_FT
PLEASE DRINK PLENTY OF FLUIDS, TAKE THE ANTIBIOTICS AS PRESCRIBED - FINISH THE FULL COURSE EVEN IF YOU START FEELING BETTER. IF THE ANTIBIOTICS DON'T WORK AGAINST THE BACTERIA IN URINE, WE WILL GIVE YOU A CALL AND CHANGE THEM. FOLLOW UP WITH YOUR DOCTOR WITHIN NEXT 4 DAYS. RETURN TO ER FOR FEVER, ABDOMINAL/LOWER BACK PAIN, NAUSEA, VOMITING, FEELING SICK.  Urinary Tract Infection, Adult     A urinary tract infection (UTI) is an infection of any part of the urinary tract. The urinary tract includes the kidneys, ureters, bladder, and urethra. These organs make, store, and get rid of urine in the body.  Your health care provider may use other names to describe the infection. An upper UTI affects the ureters and kidneys (pyelonephritis). A lower UTI affects the bladder (cystitis) and urethra (urethritis).  What are the causes?  Most urinary tract infections are caused by bacteria in your genital area, around the entrance to your urinary tract (urethra). These bacteria grow and cause inflammation of your urinary tract.  What increases the risk?  You are more likely to develop this condition if:  You have a urinary catheter that stays in place (indwelling).You are not able to control when you urinate or have a bowel movement (you have incontinence).You are female and you:  Use a spermicide or diaphragm for birth control.Have low estrogen levels.Are pregnant.You have certain genes that increase your risk (genetics).You are sexually active.You take antibiotic medicines.You have a condition that causes your flow of urine to slow down, such as:  An enlarged prostate, if you are male.Blockage in your urethra (stricture).A kidney stone.A nerve condition that affects your bladder control (neurogenic bladder).Not getting enough to drink, or not urinating often.You have certain medical conditions, such as:  Diabetes.A weak disease-fighting system (immunesystem).Sickle cell disease.Gout.Spinal cord injury.What are the signs or symptoms?  Symptoms of this condition include:  Needing to urinate right away (urgently).Frequent urination or passing small amounts of urine frequently.Pain or burning with urination.Blood in the urine.Urine that smells bad or unusual.Trouble urinating.Cloudy urine.Vaginal discharge, if you are female.Pain in the abdomen or the lower back.You may also have:  Vomiting or a decreased appetite.Confusion.Irritability or tiredness.A fever.Diarrhea.The first symptom in older adults may be confusion. In some cases, they may not have any symptoms until the infection has worsened.  How is this diagnosed?  This condition is diagnosed based on your medical history and a physical exam. You may also have other tests, including:  Urine tests.Blood tests.Tests for sexually transmitted infections (STIs).If you have had more than one UTI, a cystoscopy or imaging studies may be done to determine the cause of the infections.  How is this treated?  Treatment for this condition includes:  Antibiotic medicine.Over-the-counter medicines to treat discomfort.Drinking enough water to stay hydrated.If you have frequent infections or have other conditions such as a kidney stone, you may need to see a health care provider who specializes in the urinary tract (urologist).  In rare cases, urinary tract infections can cause sepsis. Sepsis is a life-threatening condition that occurs when the body responds to an infection. Sepsis is treated in the hospital with IV antibiotics, fluids, and other medicines.  Follow these instructions at home:     Medicines     Take over-the-counter and prescription medicines only as told by your health care provider.If you were prescribed an antibiotic medicine, take it as told by your health care provider. Do not stop using the antibiotic even if you start to feel better.General instructions     Make sure you:  Empty your bladder often and completely. Do not hold urine for long periods of time.Empty your bladder after sex.Wipe from front to back after a bowel movement if you are female. Use each tissue one time when you wipe.Drink enough fluid to keep your urine pale yellow.Keep all follow-up visits as told by your health care provider. This is important.Contact a health care provider if:  Your symptoms do not get better after 1–2 days.Your symptoms go away and then return.Get help right away if you have:  Severe pain in your back or your lower abdomen.A fever.Nausea or vomiting.Summary  A urinary tract infection (UTI) is an infection of any part of the urinary tract, which includes the kidneys, ureters, bladder, and urethra.Most urinary tract infections are caused by bacteria in your genital area, around the entrance to your urinary tract (urethra).Treatment for this condition often includes antibiotic medicines.If you were prescribed an antibiotic medicine, take it as told by your health care provider. Do not stop using the antibiotic even if you start to feel better.Keep all follow-up visits as told by your health care provider. This is important.

## 2020-06-27 LAB
CULTURE RESULTS: NO GROWTH — SIGNIFICANT CHANGE UP
SPECIMEN SOURCE: SIGNIFICANT CHANGE UP

## 2020-08-22 NOTE — ED PROVIDER NOTE - MEDICAL DECISION MAKING DETAILS
food given by EMS
34 yo F pw dysuria and urinary frequency, worsening x 4 days cw previous utis, not cw previous h/o nephrolithiasis. Given back pain and chills will cover for early pyelonephritis, and send culture.

## 2020-08-28 NOTE — PATIENT PROFILE OB - AMNIOTIC FLUID AMOUNT, LABOR
within normal limits
What Type Of Note Output Would You Prefer (Optional)?: Standard Output
How Severe Is Your Rash?: moderate
Is This A New Presentation, Or A Follow-Up?: Rash

## 2020-10-16 ENCOUNTER — EMERGENCY (EMERGENCY)
Facility: HOSPITAL | Age: 35
LOS: 1 days | Discharge: HOME | End: 2020-10-16
Attending: EMERGENCY MEDICINE | Admitting: INTERNAL MEDICINE
Payer: MEDICAID

## 2020-10-16 VITALS
SYSTOLIC BLOOD PRESSURE: 101 MMHG | HEIGHT: 66 IN | TEMPERATURE: 98 F | DIASTOLIC BLOOD PRESSURE: 71 MMHG | OXYGEN SATURATION: 98 % | HEART RATE: 72 BPM | RESPIRATION RATE: 18 BRPM | WEIGHT: 125 LBS

## 2020-10-16 DIAGNOSIS — Z98.891 HISTORY OF UTERINE SCAR FROM PREVIOUS SURGERY: Chronic | ICD-10-CM

## 2020-10-16 DIAGNOSIS — N32.89 OTHER SPECIFIED DISORDERS OF BLADDER: ICD-10-CM

## 2020-10-16 LAB
APPEARANCE UR: ABNORMAL
BACTERIA # UR AUTO: ABNORMAL /HPF
BILIRUB UR-MCNC: NEGATIVE — SIGNIFICANT CHANGE UP
COLOR SPEC: YELLOW — SIGNIFICANT CHANGE UP
COMMENT - URINE: SIGNIFICANT CHANGE UP
DIFF PNL FLD: NEGATIVE — SIGNIFICANT CHANGE UP
EPI CELLS # UR: ABNORMAL
GLUCOSE UR QL: NEGATIVE — SIGNIFICANT CHANGE UP
KETONES UR-MCNC: NEGATIVE — SIGNIFICANT CHANGE UP
LEUKOCYTE ESTERASE UR-ACNC: NEGATIVE — SIGNIFICANT CHANGE UP
NITRITE UR-MCNC: NEGATIVE — SIGNIFICANT CHANGE UP
PH UR: 6 — SIGNIFICANT CHANGE UP (ref 5–8)
PROT UR-MCNC: 15
RBC CASTS # UR COMP ASSIST: SIGNIFICANT CHANGE UP /HPF (ref 0–4)
SP GR SPEC: 1.02 — SIGNIFICANT CHANGE UP (ref 1.01–1.02)
UROBILINOGEN FLD QL: NEGATIVE — SIGNIFICANT CHANGE UP
WBC UR QL: SIGNIFICANT CHANGE UP /HPF (ref 0–5)

## 2020-10-16 PROCEDURE — 81025 URINE PREGNANCY TEST: CPT

## 2020-10-16 PROCEDURE — 81001 URINALYSIS AUTO W/SCOPE: CPT

## 2020-10-16 PROCEDURE — 99284 EMERGENCY DEPT VISIT MOD MDM: CPT

## 2020-10-16 PROCEDURE — 87086 URINE CULTURE/COLONY COUNT: CPT

## 2020-10-16 RX ORDER — PHENAZOPYRIDINE HCL 100 MG
1 TABLET ORAL
Qty: 6 | Refills: 0
Start: 2020-10-16 | End: 2020-10-17

## 2020-10-16 RX ORDER — PHENAZOPYRIDINE HCL 100 MG
200 TABLET ORAL ONCE
Refills: 0 | Status: COMPLETED | OUTPATIENT
Start: 2020-10-16 | End: 2020-10-16

## 2020-10-16 RX ORDER — FLUCONAZOLE 150 MG/1
1 TABLET ORAL
Qty: 1 | Refills: 0
Start: 2020-10-16

## 2020-10-16 RX ORDER — CEFUROXIME AXETIL 250 MG
500 TABLET ORAL ONCE
Refills: 0 | Status: COMPLETED | OUTPATIENT
Start: 2020-10-16 | End: 2020-10-16

## 2020-10-16 RX ORDER — CEFUROXIME AXETIL 250 MG
1 TABLET ORAL
Qty: 14 | Refills: 0
Start: 2020-10-16 | End: 2020-10-22

## 2020-10-16 RX ADMIN — Medication 200 MILLIGRAM(S): at 19:40

## 2020-10-16 RX ADMIN — Medication 500 MILLIGRAM(S): at 19:28

## 2020-10-16 NOTE — ED PROVIDER NOTE - ATTENDING CONTRIBUTION TO CARE
Andrei with BLANCHE Landeros. 36 y/o F with PMH of kidney stones and freq UTIs presents to the ED with c/o dysuria, frequency and lower abd pain. No vomiting. No fever. H/O similar in the past.   Pt states this does not feel like her kidney stones.    Exam WNL except suprapubic tenderness. No CVA tenderness. Non toxic appearing. Vitals reviewed. Ceftin ordered. LMP 10/1 (ended ~10/5). Culture sent. UCG neg.   I performed a face to face bedside interview with patient regarding history of present illness, review of symptoms and past medical history. I completed an independent physical exam.  I have discussed the patient's plan of care with Physician Assistant (PA). I agree with note as stated above, having amended the EMR as needed to reflect my findings.   This includes History of Present Illness, HIV, Past Medical/Surgical/Family/Social History, Allergies and Home Medications, Review of Systems, Physical Exam, and any Progress Notes during the time I functioned as the attending physician for this patient.

## 2020-10-16 NOTE — ED ADULT NURSE NOTE - OBJECTIVE STATEMENT
P kai alert, came to the ER due to burning and frequency on urination for 2 days now. Denies fever or cough or SOB.

## 2020-10-16 NOTE — ED PROVIDER NOTE - CLINICAL SUMMARY MEDICAL DECISION MAKING FREE TEXT BOX
34 y/o F with PMH of kidney stones and freq UTIs presents to the ED with c/o dysuria, frequency and lower abd pain. No vomiting. No fever. H/O similar in the past.   Pt states this does not feel like her kidney stones.   Exam WNL except suprapubic tenderness. No CVA tenderness. Non toxic appearing. Vitals reviewed. Ceftin ordered. LMP 10/1 (ended ~10/5). Culture sent. UCG neg. Worsening, continued or ANY new concerning symptoms return to the emergency department.

## 2020-10-16 NOTE — ED PROVIDER NOTE - OBJECTIVE STATEMENT
34 y/o F with PMH of kidney stones and freq UTIs presents to the ED with c/o 36 y/o F with PMH of kidney stones and freq UTIs presents to the ED with c/o dysuria, frequency and lower abd pain. No vomiting. No fever. H/O similar in the past.   Pt states this does not feel like her kidney stones. 34 y/o F with PMH of kidney stones and freq UTIs presents to the ED with c/o dysuria, frequency and lower abd pain x 2 days. No vomiting. No fever. H/O similar in the past. Pt states this does not feel like her kidney stones.

## 2020-10-16 NOTE — ED PROVIDER NOTE - PATIENT PORTAL LINK FT
You can access the FollowMyHealth Patient Portal offered by [facility] by registering at the following website: [email or web address]. By joining [employer]’s FollowMyHealth portal, you will also be able to view your health information using other applications (apps) compatible with our system.

## 2020-10-17 LAB
CULTURE RESULTS: SIGNIFICANT CHANGE UP
SPECIMEN SOURCE: SIGNIFICANT CHANGE UP

## 2020-10-26 ENCOUNTER — EMERGENCY (EMERGENCY)
Facility: HOSPITAL | Age: 35
LOS: 1 days | Discharge: HOME | End: 2020-10-26
Attending: EMERGENCY MEDICINE | Admitting: EMERGENCY MEDICINE
Payer: MEDICAID

## 2020-10-26 VITALS
DIASTOLIC BLOOD PRESSURE: 62 MMHG | HEART RATE: 101 BPM | OXYGEN SATURATION: 100 % | SYSTOLIC BLOOD PRESSURE: 89 MMHG | TEMPERATURE: 98 F | HEIGHT: 66 IN | RESPIRATION RATE: 20 BRPM

## 2020-10-26 VITALS
SYSTOLIC BLOOD PRESSURE: 126 MMHG | OXYGEN SATURATION: 98 % | RESPIRATION RATE: 18 BRPM | DIASTOLIC BLOOD PRESSURE: 76 MMHG | HEART RATE: 61 BPM

## 2020-10-26 DIAGNOSIS — Z98.891 HISTORY OF UTERINE SCAR FROM PREVIOUS SURGERY: Chronic | ICD-10-CM

## 2020-10-26 DIAGNOSIS — R11.2 NAUSEA WITH VOMITING, UNSPECIFIED: ICD-10-CM

## 2020-10-26 LAB
ALBUMIN SERPL ELPH-MCNC: 4.1 G/DL — SIGNIFICANT CHANGE UP (ref 3.3–5)
ALP SERPL-CCNC: 77 U/L — SIGNIFICANT CHANGE UP (ref 40–120)
ALT FLD-CCNC: 25 U/L — SIGNIFICANT CHANGE UP (ref 10–45)
ANION GAP SERPL CALC-SCNC: 12 MMOL/L — SIGNIFICANT CHANGE UP (ref 5–17)
AST SERPL-CCNC: 26 U/L — SIGNIFICANT CHANGE UP (ref 10–40)
BASOPHILS # BLD AUTO: 0.05 K/UL — SIGNIFICANT CHANGE UP (ref 0–0.2)
BASOPHILS NFR BLD AUTO: 0.3 % — SIGNIFICANT CHANGE UP (ref 0–2)
BILIRUB SERPL-MCNC: 0.4 MG/DL — SIGNIFICANT CHANGE UP (ref 0.2–1.2)
BUN SERPL-MCNC: 15 MG/DL — SIGNIFICANT CHANGE UP (ref 7–23)
CALCIUM SERPL-MCNC: 9 MG/DL — SIGNIFICANT CHANGE UP (ref 8.4–10.5)
CHLORIDE SERPL-SCNC: 104 MMOL/L — SIGNIFICANT CHANGE UP (ref 96–108)
CO2 SERPL-SCNC: 24 MMOL/L — SIGNIFICANT CHANGE UP (ref 22–31)
CREAT SERPL-MCNC: 0.87 MG/DL — SIGNIFICANT CHANGE UP (ref 0.5–1.3)
EOSINOPHIL # BLD AUTO: 0.02 K/UL — SIGNIFICANT CHANGE UP (ref 0–0.5)
EOSINOPHIL NFR BLD AUTO: 0.1 % — SIGNIFICANT CHANGE UP (ref 0–6)
GLUCOSE SERPL-MCNC: 138 MG/DL — HIGH (ref 70–99)
HCG SERPL-ACNC: <1 MIU/ML — SIGNIFICANT CHANGE UP
HCT VFR BLD CALC: 40.6 % — SIGNIFICANT CHANGE UP (ref 34.5–45)
HGB BLD-MCNC: 13.2 G/DL — SIGNIFICANT CHANGE UP (ref 11.5–15.5)
IMM GRANULOCYTES NFR BLD AUTO: 0.6 % — SIGNIFICANT CHANGE UP (ref 0–1.5)
LIDOCAIN IGE QN: 111 U/L — SIGNIFICANT CHANGE UP (ref 73–393)
LYMPHOCYTES # BLD AUTO: 0.97 K/UL — LOW (ref 1–3.3)
LYMPHOCYTES # BLD AUTO: 4.9 % — LOW (ref 13–44)
MCHC RBC-ENTMCNC: 29 PG — SIGNIFICANT CHANGE UP (ref 27–34)
MCHC RBC-ENTMCNC: 32.5 GM/DL — SIGNIFICANT CHANGE UP (ref 32–36)
MCV RBC AUTO: 89.2 FL — SIGNIFICANT CHANGE UP (ref 80–100)
MONOCYTES # BLD AUTO: 0.68 K/UL — SIGNIFICANT CHANGE UP (ref 0–0.9)
MONOCYTES NFR BLD AUTO: 3.5 % — SIGNIFICANT CHANGE UP (ref 2–14)
NEUTROPHILS # BLD AUTO: 17.84 K/UL — HIGH (ref 1.8–7.4)
NEUTROPHILS NFR BLD AUTO: 90.6 % — HIGH (ref 43–77)
NRBC # BLD: 0 /100 WBCS — SIGNIFICANT CHANGE UP (ref 0–0)
PLATELET # BLD AUTO: 349 K/UL — SIGNIFICANT CHANGE UP (ref 150–400)
POTASSIUM SERPL-MCNC: 4.2 MMOL/L — SIGNIFICANT CHANGE UP (ref 3.5–5.3)
POTASSIUM SERPL-SCNC: 4.2 MMOL/L — SIGNIFICANT CHANGE UP (ref 3.5–5.3)
PROT SERPL-MCNC: 8.2 G/DL — SIGNIFICANT CHANGE UP (ref 6–8.3)
RBC # BLD: 4.55 M/UL — SIGNIFICANT CHANGE UP (ref 3.8–5.2)
RBC # FLD: 14.2 % — SIGNIFICANT CHANGE UP (ref 10.3–14.5)
SODIUM SERPL-SCNC: 140 MMOL/L — SIGNIFICANT CHANGE UP (ref 135–145)
WBC # BLD: 19.67 K/UL — HIGH (ref 3.8–10.5)
WBC # FLD AUTO: 19.67 K/UL — HIGH (ref 3.8–10.5)

## 2020-10-26 PROCEDURE — 96375 TX/PRO/DX INJ NEW DRUG ADDON: CPT

## 2020-10-26 PROCEDURE — 96376 TX/PRO/DX INJ SAME DRUG ADON: CPT

## 2020-10-26 PROCEDURE — 99284 EMERGENCY DEPT VISIT MOD MDM: CPT

## 2020-10-26 PROCEDURE — 83690 ASSAY OF LIPASE: CPT

## 2020-10-26 PROCEDURE — 96374 THER/PROPH/DIAG INJ IV PUSH: CPT

## 2020-10-26 PROCEDURE — 85025 COMPLETE CBC W/AUTO DIFF WBC: CPT

## 2020-10-26 PROCEDURE — 96361 HYDRATE IV INFUSION ADD-ON: CPT

## 2020-10-26 PROCEDURE — 99284 EMERGENCY DEPT VISIT MOD MDM: CPT | Mod: 25

## 2020-10-26 PROCEDURE — 84702 CHORIONIC GONADOTROPIN TEST: CPT

## 2020-10-26 PROCEDURE — 36415 COLL VENOUS BLD VENIPUNCTURE: CPT

## 2020-10-26 PROCEDURE — 80053 COMPREHEN METABOLIC PANEL: CPT

## 2020-10-26 RX ORDER — DIPHENHYDRAMINE HCL 50 MG
25 CAPSULE ORAL ONCE
Refills: 0 | Status: COMPLETED | OUTPATIENT
Start: 2020-10-26 | End: 2020-10-26

## 2020-10-26 RX ORDER — ONDANSETRON 8 MG/1
4 TABLET, FILM COATED ORAL ONCE
Refills: 0 | Status: COMPLETED | OUTPATIENT
Start: 2020-10-26 | End: 2020-10-26

## 2020-10-26 RX ORDER — FAMOTIDINE 10 MG/ML
20 INJECTION INTRAVENOUS DAILY
Refills: 0 | Status: DISCONTINUED | OUTPATIENT
Start: 2020-10-26 | End: 2020-10-29

## 2020-10-26 RX ORDER — ONDANSETRON 8 MG/1
1 TABLET, FILM COATED ORAL
Qty: 20 | Refills: 0
Start: 2020-10-26

## 2020-10-26 RX ORDER — SODIUM CHLORIDE 9 MG/ML
2000 INJECTION INTRAMUSCULAR; INTRAVENOUS; SUBCUTANEOUS ONCE
Refills: 0 | Status: COMPLETED | OUTPATIENT
Start: 2020-10-26 | End: 2020-10-26

## 2020-10-26 RX ADMIN — ONDANSETRON 4 MILLIGRAM(S): 8 TABLET, FILM COATED ORAL at 06:00

## 2020-10-26 RX ADMIN — ONDANSETRON 4 MILLIGRAM(S): 8 TABLET, FILM COATED ORAL at 05:00

## 2020-10-26 RX ADMIN — Medication 25 MILLIGRAM(S): at 05:15

## 2020-10-26 RX ADMIN — FAMOTIDINE 20 MILLIGRAM(S): 10 INJECTION INTRAVENOUS at 05:00

## 2020-10-26 RX ADMIN — FAMOTIDINE 100 MILLIGRAM(S): 10 INJECTION INTRAVENOUS at 04:30

## 2020-10-26 RX ADMIN — SODIUM CHLORIDE 2000 MILLILITER(S): 9 INJECTION INTRAMUSCULAR; INTRAVENOUS; SUBCUTANEOUS at 04:30

## 2020-10-26 RX ADMIN — SODIUM CHLORIDE 2000 MILLILITER(S): 9 INJECTION INTRAMUSCULAR; INTRAVENOUS; SUBCUTANEOUS at 06:45

## 2020-10-26 RX ADMIN — Medication 25 MILLIGRAM(S): at 06:14

## 2020-10-26 RX ADMIN — ONDANSETRON 4 MILLIGRAM(S): 8 TABLET, FILM COATED ORAL at 04:30

## 2020-10-26 NOTE — ED PROVIDER NOTE - CLINICAL SUMMARY MEDICAL DECISION MAKING FREE TEXT BOX
34yo F c nausea/vomiting. benign abd. possible gastritis, pud, food poisoining, gastroenteritis. check labs, lytes. give iv fluids, zofran, pepcid. reassess 34yo F c nausea/vomiting. benign abd. possible gastritis, pud, food poisoining, gastroenteritis. check labs, lytes. give iv fluids, zofran, pepcid. reassess    0630 pt feeling better after zofran. no vomiting after triage. abd still nontender. tolerated po fluid.

## 2020-10-26 NOTE — ED PROVIDER NOTE - CARE PROVIDER_API CALL
SHANT FUENTES  GASTROENTEROLOGY  [address], [place] [address]  Phone: [phone]  Fax: [phone]  Follow Up Time: 1-3 Days

## 2020-10-26 NOTE — ED ADULT NURSE NOTE - NSFALLRSKASSESSDT_ED_ALL_ED
26-Oct-2020 04:52
I will SWITCH the dose or number of times a day I take the medications listed below when I get home from the hospital:  None

## 2020-10-26 NOTE — ED PROVIDER NOTE - GASTROINTESTINAL [+], MLM
ABDOMINAL PAIN/VOMITING/NAUSEA Area L Indication Text: Tumors in this location are included in Area L (trunk and extremities).  Mohs surgery is indicated for larger tumors, or tumors with aggressive histologic features, in these anatomic locations.

## 2020-10-26 NOTE — ED PROVIDER NOTE - PATIENT PORTAL LINK FT
You can access the FollowMyHealth Patient Portal offered by [facility] by registering at the following website: [email or web address]. By joining [employer]’s FollowMyHealth portal, you will also be able to view your health information using other applications (apps) compatible with our system.

## 2020-10-26 NOTE — ED PROVIDER NOTE - NSFOLLOWUPINSTRUCTIONS_ED_ALL_ED_FT
take ondansetron (zofran) as directed for nausea      Acute Nausea and Vomiting    WHAT YOU NEED TO KNOW:    Acute nausea and vomiting start suddenly, worsen quickly, and last a short time.    DISCHARGE INSTRUCTIONS:    Return to the emergency department if:   •You see blood in your vomit or your bowel movements.      •You have sudden, severe pain in your chest and upper abdomen after hard vomiting or retching.      •You have swelling in your neck and chest.       •You are dizzy, cold, and thirsty and your eyes and mouth are dry.      •You are urinating very little or not at all.      •You have muscle weakness, leg cramps, and trouble breathing.       •Your heart is beating much faster than normal.       •You continue to vomit for more than 48 hours.       Contact your healthcare provider if:   •You have frequent dry heaves (vomiting but nothing comes out).      •Your nausea and vomiting does not get better or go away after you use medicine.      •You have questions or concerns about your condition or treatment.      Medicines: You may need any of the following:   •Medicines may be given to calm your stomach and stop your vomiting. You may also need medicines to help you feel more relaxed or to stop nausea and vomiting caused by motion sickness.      •Gastrointestinal stimulants are used to help empty your stomach and bowels. This may help decrease nausea and vomiting.      •Take your medicine as directed. Contact your healthcare provider if you think your medicine is not helping or if you have side effects. Tell him or her if you are allergic to any medicine. Keep a list of the medicines, vitamins, and herbs you take. Include the amounts, and when and why you take them. Bring the list or the pill bottles to follow-up visits. Carry your medicine list with you in case of an emergency.      Prevent or manage acute nausea and vomiting:   •Do not drink alcohol. Alcohol may upset or irritate your stomach. Too much alcohol can also cause acute nausea and vomiting.      •Control stress. Headaches due to stress may cause nausea and vomiting. Find ways to relax and manage your stress. Get more rest and sleep.      •Drink more liquids as directed. Vomiting can lead to dehydration. It is important to drink more liquids to help replace lost body fluids. Ask your healthcare provider how much liquid to drink each day and which liquids are best for you. Your provider may recommend that you drink an oral rehydration solution (ORS). ORS contains water, salts, and sugar that are needed to replace the lost body fluids. Ask what kind of ORS to use, how much to drink, and where to get it.      •Eat smaller meals, more often. Eat small amounts of food every 2 to 3 hours, even if you are not hungry. Food in your stomach may decrease your nausea.      •Talk to your healthcare provider before you take over-the-counter (OTC) medicines. These medicines can cause serious problems if you use certain other medicines, or you have a medical condition. You may have problems if you use too much or use them for longer than the label says. Follow directions on the label carefully.       Follow up with your healthcare provider as directed: Write down your questions so you remember to ask them during your follow-up visits.

## 2020-10-26 NOTE — ED PROVIDER NOTE - OBJECTIVE STATEMENT
pt c/o vomiting, severe, nbnb, for last 2 hrs, assoc c stomach cramps. pt states she ate something that didn't agree with her, and that she gets these episodes due to her gastritis.  no diarrhea, no cory/bloody stools. no fever, chest pain, sob.

## 2020-10-27 ENCOUNTER — EMERGENCY (EMERGENCY)
Facility: HOSPITAL | Age: 35
LOS: 1 days | Discharge: HOME | End: 2020-10-27
Attending: EMERGENCY MEDICINE | Admitting: EMERGENCY MEDICINE
Payer: MEDICAID

## 2020-10-27 VITALS
HEIGHT: 66 IN | OXYGEN SATURATION: 100 % | DIASTOLIC BLOOD PRESSURE: 68 MMHG | TEMPERATURE: 98 F | RESPIRATION RATE: 17 BRPM | WEIGHT: 125 LBS | HEART RATE: 83 BPM | SYSTOLIC BLOOD PRESSURE: 118 MMHG

## 2020-10-27 DIAGNOSIS — R11.10 VOMITING, UNSPECIFIED: ICD-10-CM

## 2020-10-27 DIAGNOSIS — Z98.891 HISTORY OF UTERINE SCAR FROM PREVIOUS SURGERY: Chronic | ICD-10-CM

## 2020-10-27 LAB
ALBUMIN SERPL ELPH-MCNC: 4.1 G/DL — SIGNIFICANT CHANGE UP (ref 3.3–5)
ALP SERPL-CCNC: 70 U/L — SIGNIFICANT CHANGE UP (ref 40–120)
ALT FLD-CCNC: 21 U/L — SIGNIFICANT CHANGE UP (ref 10–45)
ANION GAP SERPL CALC-SCNC: 12 MMOL/L — SIGNIFICANT CHANGE UP (ref 5–17)
AST SERPL-CCNC: 24 U/L — SIGNIFICANT CHANGE UP (ref 10–40)
BASOPHILS # BLD AUTO: 0.03 K/UL — SIGNIFICANT CHANGE UP (ref 0–0.2)
BASOPHILS NFR BLD AUTO: 0.2 % — SIGNIFICANT CHANGE UP (ref 0–2)
BILIRUB SERPL-MCNC: 0.4 MG/DL — SIGNIFICANT CHANGE UP (ref 0.2–1.2)
BUN SERPL-MCNC: 12 MG/DL — SIGNIFICANT CHANGE UP (ref 7–23)
CALCIUM SERPL-MCNC: 9.1 MG/DL — SIGNIFICANT CHANGE UP (ref 8.4–10.5)
CHLORIDE SERPL-SCNC: 102 MMOL/L — SIGNIFICANT CHANGE UP (ref 96–108)
CO2 SERPL-SCNC: 24 MMOL/L — SIGNIFICANT CHANGE UP (ref 22–31)
CREAT SERPL-MCNC: 0.91 MG/DL — SIGNIFICANT CHANGE UP (ref 0.5–1.3)
EOSINOPHIL # BLD AUTO: 0 K/UL — SIGNIFICANT CHANGE UP (ref 0–0.5)
EOSINOPHIL NFR BLD AUTO: 0 % — SIGNIFICANT CHANGE UP (ref 0–6)
GLUCOSE SERPL-MCNC: 116 MG/DL — HIGH (ref 70–99)
HCT VFR BLD CALC: 45.1 % — HIGH (ref 34.5–45)
HGB BLD-MCNC: 14.9 G/DL — SIGNIFICANT CHANGE UP (ref 11.5–15.5)
IMM GRANULOCYTES NFR BLD AUTO: 0.3 % — SIGNIFICANT CHANGE UP (ref 0–1.5)
LYMPHOCYTES # BLD AUTO: 1.15 K/UL — SIGNIFICANT CHANGE UP (ref 1–3.3)
LYMPHOCYTES # BLD AUTO: 8.6 % — LOW (ref 13–44)
MCHC RBC-ENTMCNC: 29.1 PG — SIGNIFICANT CHANGE UP (ref 27–34)
MCHC RBC-ENTMCNC: 33 GM/DL — SIGNIFICANT CHANGE UP (ref 32–36)
MCV RBC AUTO: 88.1 FL — SIGNIFICANT CHANGE UP (ref 80–100)
MONOCYTES # BLD AUTO: 0.71 K/UL — SIGNIFICANT CHANGE UP (ref 0–0.9)
MONOCYTES NFR BLD AUTO: 5.3 % — SIGNIFICANT CHANGE UP (ref 2–14)
NEUTROPHILS # BLD AUTO: 11.38 K/UL — HIGH (ref 1.8–7.4)
NEUTROPHILS NFR BLD AUTO: 85.6 % — HIGH (ref 43–77)
NRBC # BLD: 0 /100 WBCS — SIGNIFICANT CHANGE UP (ref 0–0)
PLATELET # BLD AUTO: 361 K/UL — SIGNIFICANT CHANGE UP (ref 150–400)
POTASSIUM SERPL-MCNC: 3.9 MMOL/L — SIGNIFICANT CHANGE UP (ref 3.5–5.3)
POTASSIUM SERPL-SCNC: 3.9 MMOL/L — SIGNIFICANT CHANGE UP (ref 3.5–5.3)
PROT SERPL-MCNC: 8.2 G/DL — SIGNIFICANT CHANGE UP (ref 6–8.3)
RBC # BLD: 5.12 M/UL — SIGNIFICANT CHANGE UP (ref 3.8–5.2)
RBC # FLD: 14.3 % — SIGNIFICANT CHANGE UP (ref 10.3–14.5)
SODIUM SERPL-SCNC: 138 MMOL/L — SIGNIFICANT CHANGE UP (ref 135–145)
WBC # BLD: 13.31 K/UL — HIGH (ref 3.8–10.5)
WBC # FLD AUTO: 13.31 K/UL — HIGH (ref 3.8–10.5)

## 2020-10-27 PROCEDURE — 36415 COLL VENOUS BLD VENIPUNCTURE: CPT

## 2020-10-27 PROCEDURE — 85025 COMPLETE CBC W/AUTO DIFF WBC: CPT

## 2020-10-27 PROCEDURE — 99284 EMERGENCY DEPT VISIT MOD MDM: CPT

## 2020-10-27 PROCEDURE — 96375 TX/PRO/DX INJ NEW DRUG ADDON: CPT

## 2020-10-27 PROCEDURE — 99284 EMERGENCY DEPT VISIT MOD MDM: CPT | Mod: 25

## 2020-10-27 PROCEDURE — 80053 COMPREHEN METABOLIC PANEL: CPT

## 2020-10-27 PROCEDURE — 96365 THER/PROPH/DIAG IV INF INIT: CPT

## 2020-10-27 RX ORDER — SODIUM CHLORIDE 9 MG/ML
2000 INJECTION INTRAMUSCULAR; INTRAVENOUS; SUBCUTANEOUS ONCE
Refills: 0 | Status: COMPLETED | OUTPATIENT
Start: 2020-10-27 | End: 2020-10-27

## 2020-10-27 RX ORDER — ONDANSETRON 8 MG/1
8 TABLET, FILM COATED ORAL ONCE
Refills: 0 | Status: COMPLETED | OUTPATIENT
Start: 2020-10-27 | End: 2020-10-27

## 2020-10-27 RX ORDER — FAMOTIDINE 10 MG/ML
20 INJECTION INTRAVENOUS ONCE
Refills: 0 | Status: COMPLETED | OUTPATIENT
Start: 2020-10-27 | End: 2020-10-27

## 2020-10-27 RX ADMIN — FAMOTIDINE 20 MILLIGRAM(S): 10 INJECTION INTRAVENOUS at 09:41

## 2020-10-27 RX ADMIN — FAMOTIDINE 100 MILLIGRAM(S): 10 INJECTION INTRAVENOUS at 09:11

## 2020-10-27 RX ADMIN — SODIUM CHLORIDE 2000 MILLILITER(S): 9 INJECTION INTRAMUSCULAR; INTRAVENOUS; SUBCUTANEOUS at 09:12

## 2020-10-27 RX ADMIN — ONDANSETRON 8 MILLIGRAM(S): 8 TABLET, FILM COATED ORAL at 09:12

## 2020-10-27 RX ADMIN — Medication 1 MILLIGRAM(S): at 09:12

## 2020-10-27 NOTE — ED PROVIDER NOTE - NONTENDER LOCATION
left upper quadrant/right upper quadrant/left costovertebral angle/right lower quadrant/suprapubic/left lower quadrant/right costovertebral angle

## 2020-10-27 NOTE — ED ADULT NURSE NOTE - CHIEF COMPLAINT
Principal Discharge DX:	Status post total hip replacement, right  Goal:	Improve quality of life  Instructions for follow-up, activity and diet:	Your new total hip replacement requires proper care.  Your surgical care provider is your best resource for information.  Your Physical Therapy /Occupational Therapy will include Ambulation, Transfers , Stairs, ADLs (activities of daily living), range of motion, and isometrics.  Your participation is vital for the fullest recovery and best results.  You may bear full weight as tolerated with rolling walker, cane or assistive device.    TOTAL HIP PRECAUTIONS   Do not bend your hip more than 90 degrees.   Do not rotate your leg drastically inward.   Continue abduction pillow while in bed.   Sit in Hip Chair or a chair that does not allow your to bend more than 90 degrees.  Do not sit on low chairs or low toilet seats.  Do not take a tub bath yet.   Do not resume driving until you have your surgeon’s permission.     Keep incision clean and dry.  Change the dressing daily if there is drainage noted.  When there is no drainage the wound may be open to air.   The wound is closed with either sutures (stiches) or Prineo (glued on mesh tape.)  Both sutures and Prineo are removed 2 weeks after surgery at rehab facility or in surgeon's office.  If there is no wet drainage you may shower and pat dry with a clean towel.
The patient is a 35y Female complaining of vomiting.

## 2020-10-27 NOTE — ED PROVIDER NOTE - PATIENT PORTAL LINK FT
You can access the FollowMyHealth Patient Portal offered by [facility] by registering at the following website: [email or web address]. By joining [employer]’s FollowMyHealth portal, you will also be able to view your health information using other applications (apps) compatible with our system.

## 2020-10-27 NOTE — ED PROVIDER NOTE - NSFOLLOWUPINSTRUCTIONS_ED_ALL_ED_FT
Cyclic Vomiting Syndrome    WHAT YOU NEED TO KNOW:    Cyclic vomiting syndrome is a condition that causes you to vomit many times in a row for no known reason. It is important to prevent dehydration and other serious complications from repeated vomiting. Work with your healthcare provider to manage or prevent episodes.    DISCHARGE INSTRUCTIONS:    Return to the emergency department if:   •You see blood in your vomit or your bowel movements.      •You have sudden, severe pain in your chest and upper abdomen after hard vomiting or retching.      •You have swelling in your neck and chest.       •You are dizzy, cold, and thirsty, and your eyes and mouth are dry.      •You are urinating very little or not at all.      •You have muscle weakness, leg cramps, and trouble breathing.      •Your heart is beating much faster than normal.      •You continue to vomit for more than 48 hours.      Contact your healthcare provider if:   •You have frequent dry heaves (vomiting but nothing comes out).      •You have questions or concerns about your condition or care.      Medicines: You may need any of the following:   •Migraine medicine may be used to prevent or stop migraine headaches. This may be given if you have migraines or are at risk because of a family history of migraines. You may need to take this medicine to prevent migraines or to stop a migraine that has started.      •Antinausea medicine may be needed to control your nausea.      •Medicine may be given to control the amount of acid your stomach makes.      •Take your medicine as directed. Contact your healthcare provider if you think your medicine is not helping or if you have side effects. Tell him or her if you are allergic to any medicine. Keep a list of the medicines, vitamins, and herbs you take. Include the amounts, and when and why you take them. Bring the list or the pill bottles to follow-up visits. Carry your medicine list with you in case of an emergency.      Prevent or manage episodes:   •Avoid triggers. Certain foods can trigger episodes, such as chocolate, cheese, and monosodium glutamate (MSG). Caffeine can also trigger an episode. Your healthcare provider or dietitian can help you identify foods that trigger an episode. This will help you create meal plans to avoid those triggers. Other triggers include too much exercise, motion sickness, or being in hot weather too long.      •Drink more liquids as directed. Vomiting can lead to dehydration. It is important to drink more liquids to help replace lost body fluids. Ask your healthcare provider how much liquid to drink each day and which liquids are best for you. Your provider may recommend that you drink an oral rehydration solution (ORS). ORS contains water, salts, and sugar that are needed to replace the lost body fluids. Ask what kind of ORS to use, how much to drink, and where to get it.      •Eat smaller meals, more often. Eat small amounts of food every 2 to 3 hours, even if you are not hungry. Food in your stomach may decrease your nausea.      •Control stress. Stress or anxiety can trigger an episode. Find ways to relax and manage your stress. Get more rest and sleep.       •Do not drink alcohol. Alcohol may upset or irritate your stomach. Too much alcohol can also cause nausea and vomiting.      •Do not use marijuana (cannabis). Repeated use of marijuana over a long period of time (chronic use) can cause episodes. This is called cannabis hyperemesis syndrome. If you have an episode caused by marijuana use, a hot shower may relieve your symptoms. Ask your healthcare provider for information if want to quit using marijuana and need help quitting.      Follow up with your healthcare provider as directed: Write down your questions so you remember to ask them during your visits.

## 2020-10-27 NOTE — ED ADULT NURSE NOTE - NSIMPLEMENTINTERV_GEN_ALL_ED
Implemented All Universal Safety Interventions:  [place] to call system. Call bell, personal items and telephone within reach. Instruct patient to call for assistance. Room bathroom lighting operational. Non-slip footwear when patient is off stretcher. Physically safe environment: no spills, clutter or unnecessary equipment. Stretcher in lowest position, wheels locked, appropriate side rails in place.

## 2020-10-27 NOTE — ED PROVIDER NOTE - OBJECTIVE STATEMENT
35F presents to the ED c/o vomiting. She states it is severe vomiting, non bilious and non bloody. She notes that she was here on 10/26 for same and has not felt better since. She has associated stomach cramping. She believes she ate something that didn't agree with her and she gets these episodes every now and again due to gastritis. No diarrhea. No black or bloody stool. No fever/chest pain or SOB.   Pt smoke marijuana.

## 2020-10-27 NOTE — ED PROVIDER NOTE - CLINICAL SUMMARY MEDICAL DECISION MAKING FREE TEXT BOX
35F presents to the ED c/o vomiting. She states it is severe vomiting, non bilious and non bloody. She notes that she was here on 10/26 for same and has not felt better since. She has associated stomach cramping. She believes she ate something that didn't agree with her and she gets these episodes every now and again due to gastritis. No diarrhea. No black or bloody stool. No fever/chest pain or SOB.   Pt smokes marijuana.   Pt with only tnederness to midepigastrum. She is overly anxious with exaggerated affect. Will tx with zofran, fluids, pepcid and ativan. Likely cyclical vomiting as this occurs every few months, she is a marijuana user. Pt will req f/u with Gastro to eval for other etiologies. 35F presents to the ED c/o vomiting. She states it is severe vomiting, non bilious and non bloody. She notes that she was here on 10/26 for same and has not felt better since. She has associated stomach cramping. She believes she ate something that didn't agree with her and she gets these episodes every now and again due to gastritis. No diarrhea. No black or bloody stool. No fever/chest pain or SOB.   Pt smokes marijuana.   Pt with only tnederness to midepigastrum. She is overly anxious with exaggerated affect. Will tx with zofran, fluids, pepcid and ativan. Likely cyclical vomiting as this occurs every few months, she is a marijuana user. Pt will req f/u with Gastro to eval for other etiologies.  Improved; Slept in ED for 3 hrs. No further retching, heaving or vomiting. Stable for d/c. She has zofran ODT already and understands to take Pepcid prn.

## 2020-10-27 NOTE — ED ADULT NURSE NOTE - OBJECTIVE STATEMENT
Pt presents to ER with chief complaint of nausea vomiting and abdominal pain since yesterday. Pt reports being seen in the ER yesterday and has have relief. Pt demanding iv placement with "zofran and pepcid now". Pt is agitated, md Millard made aware

## 2021-03-09 ENCOUNTER — EMERGENCY (EMERGENCY)
Facility: HOSPITAL | Age: 36
LOS: 1 days | Discharge: HOME | End: 2021-03-09
Attending: INTERNAL MEDICINE | Admitting: INTERNAL MEDICINE
Payer: MEDICAID

## 2021-03-09 VITALS
TEMPERATURE: 97 F | OXYGEN SATURATION: 99 % | HEIGHT: 66 IN | SYSTOLIC BLOOD PRESSURE: 110 MMHG | DIASTOLIC BLOOD PRESSURE: 65 MMHG | RESPIRATION RATE: 20 BRPM | WEIGHT: 123.02 LBS

## 2021-03-09 VITALS
OXYGEN SATURATION: 100 % | SYSTOLIC BLOOD PRESSURE: 109 MMHG | DIASTOLIC BLOOD PRESSURE: 72 MMHG | RESPIRATION RATE: 17 BRPM | HEART RATE: 68 BPM | TEMPERATURE: 97 F

## 2021-03-09 DIAGNOSIS — Z98.891 HISTORY OF UTERINE SCAR FROM PREVIOUS SURGERY: Chronic | ICD-10-CM

## 2021-03-09 LAB
ALBUMIN SERPL ELPH-MCNC: 4 G/DL — SIGNIFICANT CHANGE UP (ref 3.3–5)
ALP SERPL-CCNC: 78 U/L — SIGNIFICANT CHANGE UP (ref 40–120)
ALT FLD-CCNC: 20 U/L — SIGNIFICANT CHANGE UP (ref 10–45)
ANION GAP SERPL CALC-SCNC: 11 MMOL/L — SIGNIFICANT CHANGE UP (ref 5–17)
AST SERPL-CCNC: 24 U/L — SIGNIFICANT CHANGE UP (ref 10–40)
BASOPHILS # BLD AUTO: 0.06 K/UL — SIGNIFICANT CHANGE UP (ref 0–0.2)
BASOPHILS NFR BLD AUTO: 0.5 % — SIGNIFICANT CHANGE UP (ref 0–2)
BILIRUB SERPL-MCNC: 0.3 MG/DL — SIGNIFICANT CHANGE UP (ref 0.2–1.2)
BUN SERPL-MCNC: 11 MG/DL — SIGNIFICANT CHANGE UP (ref 7–23)
CALCIUM SERPL-MCNC: 9.1 MG/DL — SIGNIFICANT CHANGE UP (ref 8.4–10.5)
CHLORIDE SERPL-SCNC: 105 MMOL/L — SIGNIFICANT CHANGE UP (ref 96–108)
CO2 SERPL-SCNC: 23 MMOL/L — SIGNIFICANT CHANGE UP (ref 22–31)
CREAT SERPL-MCNC: 0.66 MG/DL — SIGNIFICANT CHANGE UP (ref 0.5–1.3)
EOSINOPHIL # BLD AUTO: 0.05 K/UL — SIGNIFICANT CHANGE UP (ref 0–0.5)
EOSINOPHIL NFR BLD AUTO: 0.4 % — SIGNIFICANT CHANGE UP (ref 0–6)
GLUCOSE SERPL-MCNC: 123 MG/DL — HIGH (ref 70–99)
HCT VFR BLD CALC: 43.8 % — SIGNIFICANT CHANGE UP (ref 34.5–45)
HGB BLD-MCNC: 13.8 G/DL — SIGNIFICANT CHANGE UP (ref 11.5–15.5)
IMM GRANULOCYTES NFR BLD AUTO: 0.4 % — SIGNIFICANT CHANGE UP (ref 0–1.5)
LIDOCAIN IGE QN: 171 U/L — SIGNIFICANT CHANGE UP (ref 73–393)
LYMPHOCYTES # BLD AUTO: 16.7 % — SIGNIFICANT CHANGE UP (ref 13–44)
LYMPHOCYTES # BLD AUTO: 2.02 K/UL — SIGNIFICANT CHANGE UP (ref 1–3.3)
MCHC RBC-ENTMCNC: 29.4 PG — SIGNIFICANT CHANGE UP (ref 27–34)
MCHC RBC-ENTMCNC: 31.5 GM/DL — LOW (ref 32–36)
MCV RBC AUTO: 93.2 FL — SIGNIFICANT CHANGE UP (ref 80–100)
MONOCYTES # BLD AUTO: 0.48 K/UL — SIGNIFICANT CHANGE UP (ref 0–0.9)
MONOCYTES NFR BLD AUTO: 4 % — SIGNIFICANT CHANGE UP (ref 2–14)
NEUTROPHILS # BLD AUTO: 9.4 K/UL — HIGH (ref 1.8–7.4)
NEUTROPHILS NFR BLD AUTO: 78 % — HIGH (ref 43–77)
NRBC # BLD: 0 /100 WBCS — SIGNIFICANT CHANGE UP (ref 0–0)
PLATELET # BLD AUTO: 400 K/UL — SIGNIFICANT CHANGE UP (ref 150–400)
POTASSIUM SERPL-MCNC: 4.4 MMOL/L — SIGNIFICANT CHANGE UP (ref 3.5–5.3)
POTASSIUM SERPL-SCNC: 4.4 MMOL/L — SIGNIFICANT CHANGE UP (ref 3.5–5.3)
PROT SERPL-MCNC: 7.9 G/DL — SIGNIFICANT CHANGE UP (ref 6–8.3)
RBC # BLD: 4.7 M/UL — SIGNIFICANT CHANGE UP (ref 3.8–5.2)
RBC # FLD: 13.6 % — SIGNIFICANT CHANGE UP (ref 10.3–14.5)
SODIUM SERPL-SCNC: 139 MMOL/L — SIGNIFICANT CHANGE UP (ref 135–145)
WBC # BLD: 12.06 K/UL — HIGH (ref 3.8–10.5)
WBC # FLD AUTO: 12.06 K/UL — HIGH (ref 3.8–10.5)

## 2021-03-09 PROCEDURE — 96375 TX/PRO/DX INJ NEW DRUG ADDON: CPT

## 2021-03-09 PROCEDURE — 71045 X-RAY EXAM CHEST 1 VIEW: CPT | Mod: 26

## 2021-03-09 PROCEDURE — 83690 ASSAY OF LIPASE: CPT

## 2021-03-09 PROCEDURE — 99284 EMERGENCY DEPT VISIT MOD MDM: CPT

## 2021-03-09 PROCEDURE — 71045 X-RAY EXAM CHEST 1 VIEW: CPT

## 2021-03-09 PROCEDURE — 96365 THER/PROPH/DIAG IV INF INIT: CPT

## 2021-03-09 PROCEDURE — 85025 COMPLETE CBC W/AUTO DIFF WBC: CPT

## 2021-03-09 PROCEDURE — 80053 COMPREHEN METABOLIC PANEL: CPT

## 2021-03-09 PROCEDURE — 96361 HYDRATE IV INFUSION ADD-ON: CPT

## 2021-03-09 PROCEDURE — 99284 EMERGENCY DEPT VISIT MOD MDM: CPT | Mod: 25

## 2021-03-09 PROCEDURE — 36415 COLL VENOUS BLD VENIPUNCTURE: CPT

## 2021-03-09 RX ORDER — ONDANSETRON 8 MG/1
4 TABLET, FILM COATED ORAL ONCE
Refills: 0 | Status: COMPLETED | OUTPATIENT
Start: 2021-03-09 | End: 2021-03-09

## 2021-03-09 RX ORDER — HYDROXYZINE HCL 10 MG
1 TABLET ORAL
Qty: 30 | Refills: 0
Start: 2021-03-09 | End: 2021-03-18

## 2021-03-09 RX ORDER — FAMOTIDINE 10 MG/ML
20 INJECTION INTRAVENOUS ONCE
Refills: 0 | Status: COMPLETED | OUTPATIENT
Start: 2021-03-09 | End: 2021-03-09

## 2021-03-09 RX ORDER — SODIUM CHLORIDE 9 MG/ML
1000 INJECTION INTRAMUSCULAR; INTRAVENOUS; SUBCUTANEOUS ONCE
Refills: 0 | Status: COMPLETED | OUTPATIENT
Start: 2021-03-09 | End: 2021-03-09

## 2021-03-09 RX ORDER — DIAZEPAM 5 MG
5 TABLET ORAL ONCE
Refills: 0 | Status: DISCONTINUED | OUTPATIENT
Start: 2021-03-09 | End: 2021-03-09

## 2021-03-09 RX ORDER — ONDANSETRON 8 MG/1
1 TABLET, FILM COATED ORAL
Qty: 30 | Refills: 0
Start: 2021-03-09 | End: 2021-03-18

## 2021-03-09 RX ADMIN — FAMOTIDINE 100 MILLIGRAM(S): 10 INJECTION INTRAVENOUS at 09:37

## 2021-03-09 RX ADMIN — Medication 5 MILLIGRAM(S): at 10:47

## 2021-03-09 RX ADMIN — SODIUM CHLORIDE 1000 MILLILITER(S): 9 INJECTION INTRAMUSCULAR; INTRAVENOUS; SUBCUTANEOUS at 09:03

## 2021-03-09 RX ADMIN — ONDANSETRON 4 MILLIGRAM(S): 8 TABLET, FILM COATED ORAL at 09:02

## 2021-03-09 RX ADMIN — SODIUM CHLORIDE 1000 MILLILITER(S): 9 INJECTION INTRAMUSCULAR; INTRAVENOUS; SUBCUTANEOUS at 10:00

## 2021-03-09 RX ADMIN — Medication 1 MILLIGRAM(S): at 09:02

## 2021-03-09 RX ADMIN — FAMOTIDINE 20 MILLIGRAM(S): 10 INJECTION INTRAVENOUS at 10:07

## 2021-03-09 RX ADMIN — ONDANSETRON 4 MILLIGRAM(S): 8 TABLET, FILM COATED ORAL at 10:48

## 2021-03-09 NOTE — ED PROVIDER NOTE - CARE PLAN
Principal Discharge DX:	Epigastric abdominal pain   Principal Discharge DX:	Epigastric abdominal pain  Secondary Diagnosis:	Acute anxiety

## 2021-03-09 NOTE — ED PROVIDER NOTE - NSFOLLOWUPCLINICS_GEN_ALL_ED_FT
[facility]  Psychiatry  [address] [place] [address]  Phone: [phone]  Fax:   Follow Up Time:

## 2021-03-09 NOTE — ED ADULT NURSE NOTE - NSIMPLEMENTINTERV_GEN_ALL_ED
Implemented All Universal Safety Interventions:  [place] to call system. Call bell, personal items and telephone within reach. Instruct patient to call for assistance. Room bathroom lighting operational. Non-slip footwear when patient is off stretcher. Physically safe environment: no spills, clutter or unnecessary equipment. Stretcher in lowest position, wheels locked, appropriate side rails in place.

## 2021-03-09 NOTE — ED PROVIDER NOTE - NSFOLLOWUPINSTRUCTIONS_ED_ALL_ED_FT
Rest, drink plenty of fluids  Advance activity as tolerated  Continue all previously prescribed medications as directed  Follow up with your PMD 2-3 days- bring copies of your results  Return to the ER for worsening  ativan at night only  walk in to psych

## 2021-03-09 NOTE — ED PROVIDER NOTE - PATIENT PORTAL LINK FT
You can access the FollowMyHealth Patient Portal offered by [facility] by registering at the following website: [email or web address]. By joining [employer]’s FollowMyHealth portal, you will also be able to view your health information using other applications (apps) compatible with our system.

## 2021-03-09 NOTE — ED PROVIDER NOTE - CLINICAL SUMMARY MEDICAL DECISION MAKING FREE TEXT BOX
35 y f cc upper abd pain nausea vomiting came to ed bib spouse hx of chronic gastritis   onset gradual   locations upper abdomen   duration today   characteristics upper abd pain vomiting  context hx of chronic gastritis   aggravating factors none   relieving factors none  timming  intermittent  severity moderate  plan labs iv hydration pepcid zofran  also helped by ativan 35 y f cc upper abd pain nausea vomiting came to ed bib spouse hx of chronic gastritis   onset gradual   locations upper abdomen   duration today   characteristics upper abd pain vomiting  context hx of chronic gastritis   aggravating factors none   relieving factors none  timming  intermittent  severity moderate  plan labs iv hydration pepcid zofran  also helped by ativan, later valium   labs nl

## 2021-04-08 ENCOUNTER — EMERGENCY (EMERGENCY)
Facility: HOSPITAL | Age: 36
LOS: 1 days | Discharge: HOME | End: 2021-04-08
Attending: EMERGENCY MEDICINE | Admitting: INTERNAL MEDICINE
Payer: MEDICAID

## 2021-04-08 VITALS
RESPIRATION RATE: 16 BRPM | OXYGEN SATURATION: 97 % | DIASTOLIC BLOOD PRESSURE: 77 MMHG | SYSTOLIC BLOOD PRESSURE: 112 MMHG | HEART RATE: 74 BPM

## 2021-04-08 VITALS
RESPIRATION RATE: 18 BRPM | SYSTOLIC BLOOD PRESSURE: 111 MMHG | HEIGHT: 66 IN | DIASTOLIC BLOOD PRESSURE: 75 MMHG | HEART RATE: 89 BPM | TEMPERATURE: 98 F | OXYGEN SATURATION: 98 % | WEIGHT: 115.08 LBS

## 2021-04-08 DIAGNOSIS — Z98.891 HISTORY OF UTERINE SCAR FROM PREVIOUS SURGERY: Chronic | ICD-10-CM

## 2021-04-08 LAB
ALBUMIN SERPL ELPH-MCNC: 4.1 G/DL — SIGNIFICANT CHANGE UP (ref 3.3–5)
ALP SERPL-CCNC: 69 U/L — SIGNIFICANT CHANGE UP (ref 40–120)
ALT FLD-CCNC: 24 U/L — SIGNIFICANT CHANGE UP (ref 10–45)
ANION GAP SERPL CALC-SCNC: 13 MMOL/L — SIGNIFICANT CHANGE UP (ref 5–17)
APPEARANCE UR: ABNORMAL
AST SERPL-CCNC: 73 U/L — HIGH (ref 10–40)
BACTERIA # UR AUTO: NEGATIVE /HPF — SIGNIFICANT CHANGE UP
BASOPHILS # BLD AUTO: 0.03 K/UL — SIGNIFICANT CHANGE UP (ref 0–0.2)
BASOPHILS NFR BLD AUTO: 0.1 % — SIGNIFICANT CHANGE UP (ref 0–2)
BILIRUB SERPL-MCNC: 0.7 MG/DL — SIGNIFICANT CHANGE UP (ref 0.2–1.2)
BILIRUB UR-MCNC: NEGATIVE — SIGNIFICANT CHANGE UP
BUN SERPL-MCNC: 11 MG/DL — SIGNIFICANT CHANGE UP (ref 7–23)
CALCIUM SERPL-MCNC: 9.4 MG/DL — SIGNIFICANT CHANGE UP (ref 8.4–10.5)
CHLORIDE SERPL-SCNC: 102 MMOL/L — SIGNIFICANT CHANGE UP (ref 96–108)
CO2 SERPL-SCNC: 23 MMOL/L — SIGNIFICANT CHANGE UP (ref 22–31)
COLOR SPEC: YELLOW — SIGNIFICANT CHANGE UP
CREAT SERPL-MCNC: 0.77 MG/DL — SIGNIFICANT CHANGE UP (ref 0.5–1.3)
DIFF PNL FLD: ABNORMAL
EOSINOPHIL # BLD AUTO: 0 K/UL — SIGNIFICANT CHANGE UP (ref 0–0.5)
EOSINOPHIL NFR BLD AUTO: 0 % — SIGNIFICANT CHANGE UP (ref 0–6)
EPI CELLS # UR: SIGNIFICANT CHANGE UP
GLUCOSE SERPL-MCNC: 110 MG/DL — HIGH (ref 70–99)
GLUCOSE UR QL: NEGATIVE — SIGNIFICANT CHANGE UP
HCT VFR BLD CALC: 42.1 % — SIGNIFICANT CHANGE UP (ref 34.5–45)
HGB BLD-MCNC: 13.7 G/DL — SIGNIFICANT CHANGE UP (ref 11.5–15.5)
IMM GRANULOCYTES NFR BLD AUTO: 0.8 % — SIGNIFICANT CHANGE UP (ref 0–1.5)
KETONES UR-MCNC: ABNORMAL
LEUKOCYTE ESTERASE UR-ACNC: NEGATIVE — SIGNIFICANT CHANGE UP
LIDOCAIN IGE QN: 124 U/L — SIGNIFICANT CHANGE UP (ref 73–393)
LYMPHOCYTES # BLD AUTO: 1.11 K/UL — SIGNIFICANT CHANGE UP (ref 1–3.3)
LYMPHOCYTES # BLD AUTO: 4.2 % — LOW (ref 13–44)
MCHC RBC-ENTMCNC: 29.5 PG — SIGNIFICANT CHANGE UP (ref 27–34)
MCHC RBC-ENTMCNC: 32.5 GM/DL — SIGNIFICANT CHANGE UP (ref 32–36)
MCV RBC AUTO: 90.7 FL — SIGNIFICANT CHANGE UP (ref 80–100)
MONOCYTES # BLD AUTO: 0.89 K/UL — SIGNIFICANT CHANGE UP (ref 0–0.9)
MONOCYTES NFR BLD AUTO: 3.4 % — SIGNIFICANT CHANGE UP (ref 2–14)
NEUTROPHILS # BLD AUTO: 24.19 K/UL — HIGH (ref 1.8–7.4)
NEUTROPHILS NFR BLD AUTO: 91.5 % — HIGH (ref 43–77)
NITRITE UR-MCNC: NEGATIVE — SIGNIFICANT CHANGE UP
NRBC # BLD: 0 /100 WBCS — SIGNIFICANT CHANGE UP (ref 0–0)
PH UR: 7 — SIGNIFICANT CHANGE UP (ref 5–8)
PLATELET # BLD AUTO: 357 K/UL — SIGNIFICANT CHANGE UP (ref 150–400)
POTASSIUM SERPL-MCNC: 3.9 MMOL/L — SIGNIFICANT CHANGE UP (ref 3.5–5.3)
POTASSIUM SERPL-SCNC: 3.9 MMOL/L — SIGNIFICANT CHANGE UP (ref 3.5–5.3)
PROT SERPL-MCNC: 8.2 G/DL — SIGNIFICANT CHANGE UP (ref 6–8.3)
PROT UR-MCNC: 15
RBC # BLD: 4.64 M/UL — SIGNIFICANT CHANGE UP (ref 3.8–5.2)
RBC # FLD: 13.9 % — SIGNIFICANT CHANGE UP (ref 10.3–14.5)
RBC CASTS # UR COMP ASSIST: SIGNIFICANT CHANGE UP /HPF (ref 0–4)
SODIUM SERPL-SCNC: 138 MMOL/L — SIGNIFICANT CHANGE UP (ref 135–145)
SP GR SPEC: 1.01 — SIGNIFICANT CHANGE UP (ref 1.01–1.02)
UROBILINOGEN FLD QL: NEGATIVE — SIGNIFICANT CHANGE UP
WBC # BLD: 26.44 K/UL — HIGH (ref 3.8–10.5)
WBC # FLD AUTO: 26.44 K/UL — HIGH (ref 3.8–10.5)
WBC UR QL: SIGNIFICANT CHANGE UP /HPF (ref 0–5)

## 2021-04-08 PROCEDURE — 74177 CT ABD & PELVIS W/CONTRAST: CPT

## 2021-04-08 PROCEDURE — 85025 COMPLETE CBC W/AUTO DIFF WBC: CPT

## 2021-04-08 PROCEDURE — 81001 URINALYSIS AUTO W/SCOPE: CPT

## 2021-04-08 PROCEDURE — 96365 THER/PROPH/DIAG IV INF INIT: CPT | Mod: XU

## 2021-04-08 PROCEDURE — 96375 TX/PRO/DX INJ NEW DRUG ADDON: CPT

## 2021-04-08 PROCEDURE — 99284 EMERGENCY DEPT VISIT MOD MDM: CPT | Mod: 25

## 2021-04-08 PROCEDURE — 74177 CT ABD & PELVIS W/CONTRAST: CPT | Mod: 26,MA

## 2021-04-08 PROCEDURE — 83690 ASSAY OF LIPASE: CPT

## 2021-04-08 PROCEDURE — 36415 COLL VENOUS BLD VENIPUNCTURE: CPT

## 2021-04-08 PROCEDURE — 87086 URINE CULTURE/COLONY COUNT: CPT

## 2021-04-08 PROCEDURE — 99285 EMERGENCY DEPT VISIT HI MDM: CPT

## 2021-04-08 PROCEDURE — 80053 COMPREHEN METABOLIC PANEL: CPT

## 2021-04-08 RX ORDER — SODIUM CHLORIDE 9 MG/ML
1000 INJECTION, SOLUTION INTRAVENOUS
Refills: 0 | Status: DISCONTINUED | OUTPATIENT
Start: 2021-04-08 | End: 2021-04-12

## 2021-04-08 RX ORDER — FAMOTIDINE 10 MG/ML
20 INJECTION INTRAVENOUS ONCE
Refills: 0 | Status: COMPLETED | OUTPATIENT
Start: 2021-04-08 | End: 2021-04-08

## 2021-04-08 RX ORDER — SODIUM CHLORIDE 9 MG/ML
1000 INJECTION INTRAMUSCULAR; INTRAVENOUS; SUBCUTANEOUS ONCE
Refills: 0 | Status: COMPLETED | OUTPATIENT
Start: 2021-04-08 | End: 2021-04-08

## 2021-04-08 RX ORDER — ONDANSETRON 8 MG/1
4 TABLET, FILM COATED ORAL ONCE
Refills: 0 | Status: COMPLETED | OUTPATIENT
Start: 2021-04-08 | End: 2021-04-08

## 2021-04-08 RX ORDER — FAMOTIDINE 10 MG/ML
20 INJECTION INTRAVENOUS ONCE
Refills: 0 | Status: DISCONTINUED | OUTPATIENT
Start: 2021-04-08 | End: 2021-04-08

## 2021-04-08 RX ORDER — FAMOTIDINE 10 MG/ML
1 INJECTION INTRAVENOUS
Qty: 10 | Refills: 0
Start: 2021-04-08 | End: 2021-04-12

## 2021-04-08 RX ORDER — SUCRALFATE 1 G
10 TABLET ORAL
Qty: 100 | Refills: 0
Start: 2021-04-08 | End: 2021-04-12

## 2021-04-08 RX ORDER — SUCRALFATE 1 G
1 TABLET ORAL ONCE
Refills: 0 | Status: COMPLETED | OUTPATIENT
Start: 2021-04-08 | End: 2021-04-08

## 2021-04-08 RX ORDER — ONDANSETRON 8 MG/1
1 TABLET, FILM COATED ORAL
Qty: 20 | Refills: 0
Start: 2021-04-08 | End: 2021-04-12

## 2021-04-08 RX ADMIN — Medication 1 MILLIGRAM(S): at 19:32

## 2021-04-08 RX ADMIN — SODIUM CHLORIDE 1000 MILLILITER(S): 9 INJECTION, SOLUTION INTRAVENOUS at 20:59

## 2021-04-08 RX ADMIN — ONDANSETRON 4 MILLIGRAM(S): 8 TABLET, FILM COATED ORAL at 19:19

## 2021-04-08 RX ADMIN — FAMOTIDINE 100 MILLIGRAM(S): 10 INJECTION INTRAVENOUS at 19:24

## 2021-04-08 RX ADMIN — SODIUM CHLORIDE 1000 MILLILITER(S): 9 INJECTION INTRAMUSCULAR; INTRAVENOUS; SUBCUTANEOUS at 20:20

## 2021-04-08 RX ADMIN — SODIUM CHLORIDE 1000 MILLILITER(S): 9 INJECTION INTRAMUSCULAR; INTRAVENOUS; SUBCUTANEOUS at 19:20

## 2021-04-08 RX ADMIN — FAMOTIDINE 20 MILLIGRAM(S): 10 INJECTION INTRAVENOUS at 19:54

## 2021-04-08 NOTE — ED ADULT NURSE NOTE - OBJECTIVE STATEMENT
Patient reports having abdominal pain associated with nausea and vomiting. Patient reports having abdominal pain associated with nausea and vomiting. Pt states her  has been helping her at home with the kids, but has to go back to work tomorrow. Pt concerned about her condition and how she will manage without her . Pt states she is unable to keep food or water down. Pt states she has had abdominal issues for many years.

## 2021-04-08 NOTE — ED PROVIDER NOTE - NSFOLLOWUPINSTRUCTIONS_ED_ALL_ED_FT
Follow up with your PMD within 1-2 days.   Follow up with a Gastroenterologist within the week- referral above or you can call: Find a Physician helpline ([phone]) for assistance   Show copies of your reports given to you.  Start Pantoprazole 40mg 1 tab daily 30 minutes before breakfast.   Take all of your medications as previously prescribed.  Decrease your spicy and acidic food intake, do not eat after 7 pm, sleep with your bed elevated.  Worsening, continued or ANY new concerning symptoms return to the Emergency Department.     Gastritis, Adult  Gastritis is inflammation of the stomach. There are two kinds of gastritis:  Acute gastritis. This kind develops suddenly.Chronic gastritis. This kind is much more common and lasts for a long time.Gastritis happens when the lining of the stomach becomes weak or gets damaged. Without treatment, gastritis can lead to stomach bleeding and ulcers.  What are the causes?  This condition may be caused by:  An infection.Drinking too much alcohol.Certain medicines. These include steroids, antibiotics, and some over-the-counter medicines, such as aspirin or ibuprofen.Having too much acid in the stomach.A disease of the intestines or stomach.Stress.An allergic reaction.Crohn's disease.Some cancer treatments (radiation).Sometimes the cause of this condition is not known.  What are the signs or symptoms?  Symptoms of this condition include:  Pain or a burning sensation in the upper abdomen.Nausea.Vomiting.An uncomfortable feeling of fullness after eating.Weight loss.Bad breath.Blood in your vomit or stools.In some cases, there are no symptoms.  How is this diagnosed?  This condition may be diagnosed with:  Your medical history and a description of your symptoms.A physical exam.Tests. These can include:  Blood tests.Stool tests.A test in which a thin, flexible instrument with a light and a camera is passed down the esophagus and into the stomach (upper endoscopy).A test in which a sample of tissue is taken for testing (biopsy).How is this treated?  This condition may be treated with medicines. The medicines that are used vary depending on the cause of the gastritis:  If the condition is caused by a bacterial infection, you may be given antibiotic medicines.If the condition is caused by too much acid in the stomach, you may be given medicines called H2 blockers, proton pump inhibitors, or antacids.Treatment may also involve stopping the use of certain medicines, such as aspirin, ibuprofen, or other NSAIDs.  Follow these instructions at home:  Medicines     Take over-the-counter and prescription medicines only as told by your health care provider.If you were prescribed an antibiotic medicine, take it as told by your health care provider. Do not stop taking the antibiotic even if you start to feel better.Eating and drinking        Eat small, frequent meals instead of large meals.Avoid foods and drinks that make your symptoms worse.Drink enough fluid to keep your urine pale yellow.Alcohol use     Do not drink alcohol if:  Your health care provider tells you not to drink.You are pregnant, may be pregnant, or are planning to become pregnant.If you drink alcohol:  Limit your use to:  0–1 drink a day for women.0–2 drinks a day for men.Be aware of how much alcohol is in your drink. In the [place] one drink equals one 12 oz bottle of beer (355 mL), one 5 oz glass of wine (148 mL), or one 1½ oz glass of hard liquor (44 mL).General instructions     Talk with your health care provider about ways to manage stress, such as getting regular exercise or practicing deep breathing, meditation, or yoga.Do not use any products that contain nicotine or tobacco, such as cigarettes and e-cigarettes. If you need help quitting, ask your health care provider.Keep all follow-up visits as told by your health care provider. This is important.Contact a health care provider if:  Your symptoms get worse.Your symptoms return after treatment.Get help right away if:  You vomit blood or material that looks like coffee grounds.You have black or dark red stools.You are unable to keep fluids down.Your abdominal pain gets worse.You have a fever.You do not feel better after one week.Summary  Gastritis is inflammation of the lining of the stomach that can occur suddenly (acute) or develop slowly over time (chronic).This condition is diagnosed with a medical history, a physical exam, or tests.This condition may be treated with medicines to treat infection or medicines to reduce the amount of acid in your stomach.Follow your health care provider's instructions about taking medicines, making changes to your diet, and knowing when to call for help. Follow up with your PMD within 1-2 days.   Follow up with a Gastroenterologist within the week- referral above or you can call: Find a Physician helpline ([phone]) for assistance. Show copies of your reports given to you.  Start Pepcid 20mg 1 tab 2x/day  Start Carafate 1g/10ml take before meals  Take Zofran 4mg dissolve under your tongue as needed for nausea/vomiting   Take all of your other medications as previously prescribed.  Decrease your spicy and acidic food intake, do not eat after 7 pm, sleep with your bed elevated.  Worsening, continued or ANY new concerning symptoms return to the Emergency Department.     Gastritis, Adult  Gastritis is inflammation of the stomach. There are two kinds of gastritis:  Acute gastritis. This kind develops suddenly.Chronic gastritis. This kind is much more common and lasts for a long time.Gastritis happens when the lining of the stomach becomes weak or gets damaged. Without treatment, gastritis can lead to stomach bleeding and ulcers.  What are the causes?  This condition may be caused by:  An infection.Drinking too much alcohol.Certain medicines. These include steroids, antibiotics, and some over-the-counter medicines, such as aspirin or ibuprofen.Having too much acid in the stomach.A disease of the intestines or stomach.Stress.An allergic reaction.Crohn's disease.Some cancer treatments (radiation).Sometimes the cause of this condition is not known.  What are the signs or symptoms?  Symptoms of this condition include:  Pain or a burning sensation in the upper abdomen.Nausea.Vomiting.An uncomfortable feeling of fullness after eating.Weight loss.Bad breath.Blood in your vomit or stools.In some cases, there are no symptoms.  How is this diagnosed?  This condition may be diagnosed with:  Your medical history and a description of your symptoms.A physical exam.Tests. These can include:  Blood tests.Stool tests.A test in which a thin, flexible instrument with a light and a camera is passed down the esophagus and into the stomach (upper endoscopy).A test in which a sample of tissue is taken for testing (biopsy).How is this treated?  This condition may be treated with medicines. The medicines that are used vary depending on the cause of the gastritis:  If the condition is caused by a bacterial infection, you may be given antibiotic medicines.If the condition is caused by too much acid in the stomach, you may be given medicines called H2 blockers, proton pump inhibitors, or antacids.Treatment may also involve stopping the use of certain medicines, such as aspirin, ibuprofen, or other NSAIDs.  Follow these instructions at home:  Medicines     Take over-the-counter and prescription medicines only as told by your health care provider.If you were prescribed an antibiotic medicine, take it as told by your health care provider. Do not stop taking the antibiotic even if you start to feel better.Eating and drinking        Eat small, frequent meals instead of large meals.Avoid foods and drinks that make your symptoms worse.Drink enough fluid to keep your urine pale yellow.Alcohol use     Do not drink alcohol if:  Your health care provider tells you not to drink.You are pregnant, may be pregnant, or are planning to become pregnant.If you drink alcohol:  Limit your use to:  0–1 drink a day for women.0–2 drinks a day for men.Be aware of how much alcohol is in your drink. In the [place] one drink equals one 12 oz bottle of beer (355 mL), one 5 oz glass of wine (148 mL), or one 1½ oz glass of hard liquor (44 mL).General instructions     Talk with your health care provider about ways to manage stress, such as getting regular exercise or practicing deep breathing, meditation, or yoga.Do not use any products that contain nicotine or tobacco, such as cigarettes and e-cigarettes. If you need help quitting, ask your health care provider.Keep all follow-up visits as told by your health care provider. This is important.Contact a health care provider if:  Your symptoms get worse.Your symptoms return after treatment.Get help right away if:  You vomit blood or material that looks like coffee grounds.You have black or dark red stools.You are unable to keep fluids down.Your abdominal pain gets worse.You have a fever.You do not feel better after one week.Summary  Gastritis is inflammation of the lining of the stomach that can occur suddenly (acute) or develop slowly over time (chronic).This condition is diagnosed with a medical history, a physical exam, or tests.This condition may be treated with medicines to treat infection or medicines to reduce the amount of acid in your stomach.Follow your health care provider's instructions about taking medicines, making changes to your diet, and knowing when to call for help.

## 2021-04-08 NOTE — ED PROVIDER NOTE - CLINICAL SUMMARY MEDICAL DECISION MAKING FREE TEXT BOX
36 y/o F with h/o anxiety and chronic gastritis with flares every few months, last endoscopy 5 years ago pw burning, multiple episodes of NBNB nausea, vomiting and epigastric discomfort since yesterday, diarrhea x 1 episode. Denies fever, chills, headache, cough, chest pain, shortness of breath, weakness. Also requesting medication for anxiety. Denies ETOH. (+) marijuana.   Plan: Will check basic labs with lipase, give Zofran, Carafate, Pepcid, fluids and reassess. Will need outpt GI follow up with updated endoscopy 34 y/o F with h/o anxiety and chronic gastritis with flares every few months, last endoscopy 5 years ago pw burning, multiple episodes of NBNB nausea, vomiting and epigastric discomfort since yesterday, diarrhea x 1 episode. Denies fever, chills, headache, cough, chest pain, shortness of breath, weakness. Also requesting medication for anxiety. Denies ETOH. (+) marijuana.   Plan: Will check basic labs with lipase, give Zofran, Carafate, Pepcid, fluids and reassess. Will need outpt GI follow up with updated endoscopy  **update: Labs revealed a WBC of 26,000. CT Ab/pelvis ordered revealing a nonspecific enteritis. Patient feeling better s/p med- would rather not be placed on ABX as it worsens her gastritis. Will DC on Pepcid/Carafate and have her follow up with GI. Strict ED return precautions discussed. Patient understands and agrees.

## 2021-04-08 NOTE — ED ADULT NURSE NOTE - PMH
Gastritis    Kidney stone    Urinary tract infection without hematuria, site unspecified  frequent reoccuring UTIs as per patient

## 2021-04-08 NOTE — ED PROVIDER NOTE - CARE PLAN
Principal Discharge DX:	Gastritis without bleeding, unspecified chronicity, unspecified gastritis type

## 2021-04-08 NOTE — ED PROVIDER NOTE - ATTENDING CONTRIBUTION TO CARE
I have personally seen and examined this patient. I have fully participated in the care of this patient. I have reviewed all pertinent clinical information, including history physical exam, plan and the PA's note and agree except as noted  36 y/o F with h/o anxiety and chronic gastritis with flares every few months, last endoscopy 5 years ago pw burning, multiple episodes of NBNB nausea, vomiting and epigastric discomfort since yesterday, diarrhea x 1 episode. Denies fever, chills, headache, cough, chest pain, shortness of breath, weakness. Also requesting medication for anxiety.  PE : minimal epigastric tenderness

## 2021-04-08 NOTE — ED PROVIDER NOTE - OBJECTIVE STATEMENT
34 y/o F with h/o anxiety and chronic gastritis with flares every few months, last endoscopy 5 years ago pw burning, multiple episodes of NBNB nausea, vomiting and epigastric discomfort since yesterday, diarrhea x 1 episode. Denies fever, chills, headache, cough, chest pain, shortness of breath, weakness. Also requesting medication for anxiety.

## 2021-04-08 NOTE — ED PROVIDER NOTE - PROGRESS NOTE DETAILS
BLANCHE Nuñez- labs revealed a WBC of 26,000. CT Ab/pelvis ordered revealing a nonspecific enteritis. Patient feeling better s/p med- would rather not be placed on ABX as it worsens her gastritis. Will DC on Pepcid/Carafate and have her follow up with GI

## 2021-04-08 NOTE — ED PROVIDER NOTE - PATIENT PORTAL LINK FT
You can access the FollowMyHealth Patient Portal offered by [facility] by registering at the following website: [email or web address]. By joining [employer]’s FollowMyHealth portal, you will also be able to view your health information using other applications (apps) compatible with our system.

## 2021-04-10 LAB
CULTURE RESULTS: SIGNIFICANT CHANGE UP
SPECIMEN SOURCE: SIGNIFICANT CHANGE UP

## 2021-06-04 ENCOUNTER — EMERGENCY (EMERGENCY)
Facility: HOSPITAL | Age: 36
LOS: 1 days | Discharge: HOME | End: 2021-06-04
Attending: EMERGENCY MEDICINE | Admitting: EMERGENCY MEDICINE
Payer: MEDICAID

## 2021-06-04 VITALS
OXYGEN SATURATION: 98 % | DIASTOLIC BLOOD PRESSURE: 65 MMHG | WEIGHT: 123.46 LBS | HEART RATE: 98 BPM | RESPIRATION RATE: 18 BRPM | TEMPERATURE: 98 F | HEIGHT: 66 IN | SYSTOLIC BLOOD PRESSURE: 104 MMHG

## 2021-06-04 DIAGNOSIS — Z98.891 HISTORY OF UTERINE SCAR FROM PREVIOUS SURGERY: Chronic | ICD-10-CM

## 2021-06-04 PROCEDURE — 99283 EMERGENCY DEPT VISIT LOW MDM: CPT

## 2021-06-04 NOTE — ED ADULT NURSE NOTE - CAS ELECT INFOMATION PROVIDED
resting comfortably in bed with no acute distress noted. Pt in no acute distress noted. Vitals stable. Denies cp,sob,ha,dz,n/v/d or urinary sx. Will cont to monitor for safety and comfort. DC instructions

## 2021-06-04 NOTE — ED ADULT NURSE NOTE - OBJECTIVE STATEMENT
Pt presents to the ED with c/o dysuria x 2 days and lower abdominal pain. Pt denies n/v/d, cp/sob, but c/o chills.

## 2021-06-05 PROBLEM — N39.0 URINARY TRACT INFECTION, SITE NOT SPECIFIED: Chronic | Status: ACTIVE | Noted: 2021-04-08

## 2021-06-05 PROCEDURE — 99283 EMERGENCY DEPT VISIT LOW MDM: CPT

## 2021-06-05 PROCEDURE — 87086 URINE CULTURE/COLONY COUNT: CPT

## 2021-06-05 RX ORDER — CEFUROXIME AXETIL 250 MG
1 TABLET ORAL
Qty: 14 | Refills: 0
Start: 2021-06-05 | End: 2021-06-11

## 2021-06-05 RX ORDER — PHENAZOPYRIDINE HCL 100 MG
1 TABLET ORAL
Qty: 4 | Refills: 0
Start: 2021-06-05 | End: 2021-06-06

## 2021-06-05 NOTE — ED PROVIDER NOTE - NSFOLLOWUPINSTRUCTIONS_ED_ALL_ED_FT
DYSURIA - AfterCare(R) Instructions(ER/ED)           Dysuria    WHAT YOU NEED TO KNOW:    Dysuria is difficulty urinating, or pain, burning, or discomfort with urination. Dysuria is usually a symptom of another problem.     DISCHARGE INSTRUCTIONS:    Return to the emergency department if:   •You have severe back, side, or abdominal pain.       •You have fever and shaking chills.       •You vomit several times in a row.       Contact your healthcare provider if:   •Your symptoms do not go away, even after treatment.       •You have questions or concerns about your condition or care.       Medicines:   •Medicines may be given to help treat a bacterial infection or help decrease bladder spasms.      •Take your medicine as directed. Contact your healthcare provider if you think your medicine is not helping or if you have side effects. Tell him of her if you are allergic to any medicine. Keep a list of the medicines, vitamins, and herbs you take. Include the amounts, and when and why you take them. Bring the list or the pill bottles to follow-up visits. Carry your medicine list with you in case of an emergency.      Follow up with your healthcare provider as directed: Your healthcare provider may also refer you to a urologist or nephrologist to have additional testing. Write down your questions so you remember to ask them during your visits.     Manage your dysuria:   •Drink more liquids. Liquids help flush out bacteria that may be causing an infection. Ask your healthcare provider how much liquid to drink each day and which liquids are best for you.       •Take sitz baths as directed. Fill a bathtub with 4 to 6 inches of warm water. You may also use a sitz bath pan that fits over a toilet. Sit in the sitz bath for 20 minutes. Do this 2 to 3 times a day, or as directed. The warm water can help decrease pain and swelling.          © Copyright [employer] 2021           back to top                          © Copyright [employer] 2021

## 2021-06-05 NOTE — ED PROVIDER NOTE - PATIENT PORTAL LINK FT
You can access the FollowMyHealth Patient Portal offered by [facility] by registering at the following website: [email or web address]. By joining [employer]’s FollowMyHealth portal, you will also be able to view your health information using other applications (apps) compatible with our system.

## 2021-06-06 LAB
CULTURE RESULTS: SIGNIFICANT CHANGE UP
SPECIMEN SOURCE: SIGNIFICANT CHANGE UP

## 2021-07-05 ENCOUNTER — EMERGENCY (EMERGENCY)
Facility: HOSPITAL | Age: 36
LOS: 1 days | Discharge: HOME | End: 2021-07-05
Attending: INTERNAL MEDICINE | Admitting: INTERNAL MEDICINE
Payer: MEDICAID

## 2021-07-05 VITALS
TEMPERATURE: 97 F | HEART RATE: 87 BPM | RESPIRATION RATE: 16 BRPM | WEIGHT: 119.93 LBS | DIASTOLIC BLOOD PRESSURE: 77 MMHG | HEIGHT: 66 IN | OXYGEN SATURATION: 99 % | SYSTOLIC BLOOD PRESSURE: 125 MMHG

## 2021-07-05 VITALS
TEMPERATURE: 98 F | DIASTOLIC BLOOD PRESSURE: 77 MMHG | RESPIRATION RATE: 16 BRPM | HEART RATE: 82 BPM | WEIGHT: 125 LBS | HEIGHT: 66 IN | SYSTOLIC BLOOD PRESSURE: 110 MMHG | OXYGEN SATURATION: 100 %

## 2021-07-05 VITALS
OXYGEN SATURATION: 100 % | SYSTOLIC BLOOD PRESSURE: 111 MMHG | RESPIRATION RATE: 16 BRPM | DIASTOLIC BLOOD PRESSURE: 68 MMHG | HEART RATE: 79 BPM

## 2021-07-05 DIAGNOSIS — Z98.891 HISTORY OF UTERINE SCAR FROM PREVIOUS SURGERY: Chronic | ICD-10-CM

## 2021-07-05 LAB
ALBUMIN SERPL ELPH-MCNC: 4.1 G/DL — SIGNIFICANT CHANGE UP (ref 3.3–5)
ALP SERPL-CCNC: 81 U/L — SIGNIFICANT CHANGE UP (ref 40–120)
ALT FLD-CCNC: 15 U/L — SIGNIFICANT CHANGE UP (ref 10–45)
ANION GAP SERPL CALC-SCNC: 8 MMOL/L — SIGNIFICANT CHANGE UP (ref 5–17)
AST SERPL-CCNC: 24 U/L — SIGNIFICANT CHANGE UP (ref 10–40)
BASOPHILS # BLD AUTO: 0.05 K/UL — SIGNIFICANT CHANGE UP (ref 0–0.2)
BASOPHILS NFR BLD AUTO: 0.6 % — SIGNIFICANT CHANGE UP (ref 0–2)
BILIRUB SERPL-MCNC: 0.4 MG/DL — SIGNIFICANT CHANGE UP (ref 0.2–1.2)
BUN SERPL-MCNC: 14 MG/DL — SIGNIFICANT CHANGE UP (ref 7–23)
CALCIUM SERPL-MCNC: 8.9 MG/DL — SIGNIFICANT CHANGE UP (ref 8.4–10.5)
CHLORIDE SERPL-SCNC: 105 MMOL/L — SIGNIFICANT CHANGE UP (ref 96–108)
CO2 SERPL-SCNC: 22 MMOL/L — SIGNIFICANT CHANGE UP (ref 22–31)
CREAT SERPL-MCNC: 0.8 MG/DL — SIGNIFICANT CHANGE UP (ref 0.5–1.3)
EOSINOPHIL # BLD AUTO: 0.1 K/UL — SIGNIFICANT CHANGE UP (ref 0–0.5)
EOSINOPHIL NFR BLD AUTO: 1.2 % — SIGNIFICANT CHANGE UP (ref 0–6)
GLUCOSE SERPL-MCNC: 122 MG/DL — HIGH (ref 70–99)
HCG SERPL-ACNC: 3 MIU/ML — SIGNIFICANT CHANGE UP
HCT VFR BLD CALC: 46.4 % — HIGH (ref 34.5–45)
HGB BLD-MCNC: 15.1 G/DL — SIGNIFICANT CHANGE UP (ref 11.5–15.5)
IMM GRANULOCYTES NFR BLD AUTO: 0.2 % — SIGNIFICANT CHANGE UP (ref 0–1.5)
LIDOCAIN IGE QN: 147 U/L — SIGNIFICANT CHANGE UP (ref 73–393)
LYMPHOCYTES # BLD AUTO: 3.35 K/UL — HIGH (ref 1–3.3)
LYMPHOCYTES # BLD AUTO: 39.3 % — SIGNIFICANT CHANGE UP (ref 13–44)
MCHC RBC-ENTMCNC: 29.8 PG — SIGNIFICANT CHANGE UP (ref 27–34)
MCHC RBC-ENTMCNC: 32.5 GM/DL — SIGNIFICANT CHANGE UP (ref 32–36)
MCV RBC AUTO: 91.5 FL — SIGNIFICANT CHANGE UP (ref 80–100)
MONOCYTES # BLD AUTO: 0.58 K/UL — SIGNIFICANT CHANGE UP (ref 0–0.9)
MONOCYTES NFR BLD AUTO: 6.8 % — SIGNIFICANT CHANGE UP (ref 2–14)
NEUTROPHILS # BLD AUTO: 4.42 K/UL — SIGNIFICANT CHANGE UP (ref 1.8–7.4)
NEUTROPHILS NFR BLD AUTO: 51.9 % — SIGNIFICANT CHANGE UP (ref 43–77)
NRBC # BLD: 0 /100 WBCS — SIGNIFICANT CHANGE UP (ref 0–0)
PLATELET # BLD AUTO: 340 K/UL — SIGNIFICANT CHANGE UP (ref 150–400)
POTASSIUM SERPL-MCNC: 4.8 MMOL/L — SIGNIFICANT CHANGE UP (ref 3.5–5.3)
POTASSIUM SERPL-SCNC: 4.8 MMOL/L — SIGNIFICANT CHANGE UP (ref 3.5–5.3)
PROT SERPL-MCNC: 8 G/DL — SIGNIFICANT CHANGE UP (ref 6–8.3)
RBC # BLD: 5.07 M/UL — SIGNIFICANT CHANGE UP (ref 3.8–5.2)
RBC # FLD: 13.4 % — SIGNIFICANT CHANGE UP (ref 10.3–14.5)
SODIUM SERPL-SCNC: 135 MMOL/L — SIGNIFICANT CHANGE UP (ref 135–145)
WBC # BLD: 8.52 K/UL — SIGNIFICANT CHANGE UP (ref 3.8–10.5)
WBC # FLD AUTO: 8.52 K/UL — SIGNIFICANT CHANGE UP (ref 3.8–10.5)

## 2021-07-05 PROCEDURE — 96375 TX/PRO/DX INJ NEW DRUG ADDON: CPT

## 2021-07-05 PROCEDURE — 83690 ASSAY OF LIPASE: CPT

## 2021-07-05 PROCEDURE — 85025 COMPLETE CBC W/AUTO DIFF WBC: CPT

## 2021-07-05 PROCEDURE — 36415 COLL VENOUS BLD VENIPUNCTURE: CPT

## 2021-07-05 PROCEDURE — 99284 EMERGENCY DEPT VISIT MOD MDM: CPT

## 2021-07-05 PROCEDURE — 96376 TX/PRO/DX INJ SAME DRUG ADON: CPT

## 2021-07-05 PROCEDURE — 96374 THER/PROPH/DIAG INJ IV PUSH: CPT

## 2021-07-05 PROCEDURE — 99284 EMERGENCY DEPT VISIT MOD MDM: CPT | Mod: 25

## 2021-07-05 PROCEDURE — 96365 THER/PROPH/DIAG IV INF INIT: CPT

## 2021-07-05 PROCEDURE — 80053 COMPREHEN METABOLIC PANEL: CPT

## 2021-07-05 PROCEDURE — 71045 X-RAY EXAM CHEST 1 VIEW: CPT

## 2021-07-05 PROCEDURE — 84702 CHORIONIC GONADOTROPIN TEST: CPT

## 2021-07-05 PROCEDURE — 99285 EMERGENCY DEPT VISIT HI MDM: CPT

## 2021-07-05 PROCEDURE — 71045 X-RAY EXAM CHEST 1 VIEW: CPT | Mod: 26

## 2021-07-05 PROCEDURE — 96361 HYDRATE IV INFUSION ADD-ON: CPT

## 2021-07-05 RX ORDER — ONDANSETRON 8 MG/1
4 TABLET, FILM COATED ORAL ONCE
Refills: 0 | Status: COMPLETED | OUTPATIENT
Start: 2021-07-05 | End: 2021-07-05

## 2021-07-05 RX ORDER — SODIUM CHLORIDE 9 MG/ML
3 INJECTION INTRAMUSCULAR; INTRAVENOUS; SUBCUTANEOUS EVERY 8 HOURS
Refills: 0 | Status: DISCONTINUED | OUTPATIENT
Start: 2021-07-05 | End: 2021-07-08

## 2021-07-05 RX ORDER — SODIUM CHLORIDE 9 MG/ML
1000 INJECTION INTRAMUSCULAR; INTRAVENOUS; SUBCUTANEOUS
Refills: 0 | Status: COMPLETED | OUTPATIENT
Start: 2021-07-05 | End: 2021-07-05

## 2021-07-05 RX ORDER — FAMOTIDINE 10 MG/ML
20 INJECTION INTRAVENOUS ONCE
Refills: 0 | Status: COMPLETED | OUTPATIENT
Start: 2021-07-05 | End: 2021-07-05

## 2021-07-05 RX ORDER — FAMOTIDINE 10 MG/ML
1 INJECTION INTRAVENOUS
Qty: 10 | Refills: 0
Start: 2021-07-05 | End: 2021-07-09

## 2021-07-05 RX ORDER — ONDANSETRON 8 MG/1
1 TABLET, FILM COATED ORAL
Qty: 20 | Refills: 0
Start: 2021-07-05 | End: 2021-07-09

## 2021-07-05 RX ORDER — SODIUM CHLORIDE 9 MG/ML
1700 INJECTION INTRAMUSCULAR; INTRAVENOUS; SUBCUTANEOUS ONCE
Refills: 0 | Status: COMPLETED | OUTPATIENT
Start: 2021-07-05 | End: 2021-07-05

## 2021-07-05 RX ADMIN — Medication 1 MILLIGRAM(S): at 07:38

## 2021-07-05 RX ADMIN — ONDANSETRON 4 MILLIGRAM(S): 8 TABLET, FILM COATED ORAL at 08:23

## 2021-07-05 RX ADMIN — SODIUM CHLORIDE 1000 MILLILITER(S): 9 INJECTION INTRAMUSCULAR; INTRAVENOUS; SUBCUTANEOUS at 12:59

## 2021-07-05 RX ADMIN — FAMOTIDINE 20 MILLIGRAM(S): 10 INJECTION INTRAVENOUS at 08:18

## 2021-07-05 RX ADMIN — ONDANSETRON 4 MILLIGRAM(S): 8 TABLET, FILM COATED ORAL at 10:47

## 2021-07-05 RX ADMIN — ONDANSETRON 4 MILLIGRAM(S): 8 TABLET, FILM COATED ORAL at 07:38

## 2021-07-05 RX ADMIN — Medication 1 MILLIGRAM(S): at 08:22

## 2021-07-05 RX ADMIN — Medication 1 MILLIGRAM(S): at 10:47

## 2021-07-05 RX ADMIN — SODIUM CHLORIDE 1000 MILLILITER(S): 9 INJECTION INTRAMUSCULAR; INTRAVENOUS; SUBCUTANEOUS at 11:12

## 2021-07-05 RX ADMIN — FAMOTIDINE 100 MILLIGRAM(S): 10 INJECTION INTRAVENOUS at 07:39

## 2021-07-05 RX ADMIN — SODIUM CHLORIDE 1700 MILLILITER(S): 9 INJECTION INTRAMUSCULAR; INTRAVENOUS; SUBCUTANEOUS at 07:39

## 2021-07-05 NOTE — ED PROVIDER NOTE - PATIENT PORTAL LINK FT
You can access the FollowMyHealth Patient Portal offered by [facility] by registering at the following website: [email or web address]. By joining [employer]’s FollowMyHealth portal, you will also be able to view your health information using other applications (apps) compatible with our system.

## 2021-07-05 NOTE — ED PROVIDER NOTE - CARE PROVIDER_API CALL
Celestine Alejandra (MD)  Gastroenterology; Internal Medicine  [address] [place] [address]  Phone: [phone]  Fax: [phone]  Follow Up Time:

## 2021-07-05 NOTE — ED PROVIDER NOTE - CLINICAL SUMMARY MEDICAL DECISION MAKING FREE TEXT BOX
35 y f cc epigastric abdominal pain nausea vomiting since 4 am this morning had similar episodes before pt stated was rx by dr wilkerson with pepcid zofran ativan was better has been following up with dr curiel GI doctor who has dx her with gastritis  pt denies use of alcohol or thc   onset gradual   locations  gi   duration 1 day   characteristics nausea and vomiting epigastric abdominal pain   context hx of  gastritis  aggravating factors none   relieving factors none  timming intermittent   severity moderate  plan labs pepcid zofran ativan xray chest r/o free air reeval 35 y f cc epigastric abdominal pain nausea vomiting since 4 am this morning had similar episodes before pt stated was rx by dr wilkerson with pepcid zofran ativan was better has been following up with dr curiel GI doctor who has dx her with gastritis  pt denies use of alcohol or thc   onset gradual   locations  gi   duration 1 day   characteristics nausea and vomiting epigastric abdominal pain   context hx of  gastritis  aggravating factors none   relieving factors none  timming intermittent   severity moderate  plan labs pepcid zofran ativan xray chest r/o free air all wnl reeval-- abd soft nt , tolerating po well

## 2021-07-05 NOTE — ED PROVIDER NOTE - CLINICAL SUMMARY MEDICAL DECISION MAKING FREE TEXT BOX
acute vomiting after recent discharge hx of cyclic vomiting syndrome labs nl pt rx again in ed with iv hydration zofran ativan symptoms improved discharged

## 2021-07-05 NOTE — ED PROVIDER NOTE - OBJECTIVE STATEMENT
35 y f cc epigastric abdominal pain nausea vomiting since 4 am this morning had similar episodes before pt stated was rx by dr wilkerson with pepcid zofran ativan was better has been following up with dr curiel GI doctor who has dx her with gastritis  pt denies use of alcohol or thc   onset gradual   locations  gi   duration 1 day   characteristics nausea and vomiting epigastric abdominal pain   context hx of  gastritis  aggravating factors none   relieving factors none  timming intermittent   severity moderate

## 2021-07-05 NOTE — ED PROVIDER NOTE - OBJECTIVE STATEMENT
35 y f hx of gastritis cyclic vomiting started vomiting after discharge so came back 35 y f hx of gastritis cyclic vomiting started vomiting after discharge so came back  onset gradual   locations GI   duration days   characteristics vomiting   context hx of cyclic vomiting syndrome just discharged came back vomiting started again  aggravating factors none   relieving factors was rx with zofran and ativan symptoms better   timming intermittent   severity moderate

## 2021-07-05 NOTE — ED PROVIDER NOTE - PATIENT PORTAL LINK FT
You can access the FollowMyHealth Patient Portal offered by [facility] by registering at the following website: [email or web address]. By joining [employer]’s FollowMyHealth portal, you will also be able to view your health information using other applications (apps) compatible with our system.

## 2021-07-06 ENCOUNTER — EMERGENCY (EMERGENCY)
Facility: HOSPITAL | Age: 36
LOS: 1 days | Discharge: HOME | End: 2021-07-06
Attending: EMERGENCY MEDICINE | Admitting: EMERGENCY MEDICINE
Payer: MEDICAID

## 2021-07-06 VITALS
OXYGEN SATURATION: 97 % | WEIGHT: 126.1 LBS | TEMPERATURE: 98 F | RESPIRATION RATE: 16 BRPM | HEART RATE: 80 BPM | SYSTOLIC BLOOD PRESSURE: 111 MMHG | HEIGHT: 66 IN | DIASTOLIC BLOOD PRESSURE: 76 MMHG

## 2021-07-06 DIAGNOSIS — Z98.891 HISTORY OF UTERINE SCAR FROM PREVIOUS SURGERY: Chronic | ICD-10-CM

## 2021-07-06 PROCEDURE — 99284 EMERGENCY DEPT VISIT MOD MDM: CPT | Mod: 25

## 2021-07-06 PROCEDURE — 96372 THER/PROPH/DIAG INJ SC/IM: CPT

## 2021-07-06 PROCEDURE — 99284 EMERGENCY DEPT VISIT MOD MDM: CPT

## 2021-07-06 RX ORDER — ONDANSETRON 8 MG/1
4 TABLET, FILM COATED ORAL ONCE
Refills: 0 | Status: COMPLETED | OUTPATIENT
Start: 2021-07-06 | End: 2021-07-06

## 2021-07-06 RX ORDER — HALOPERIDOL DECANOATE 100 MG/ML
5 INJECTION INTRAMUSCULAR ONCE
Refills: 0 | Status: COMPLETED | OUTPATIENT
Start: 2021-07-06 | End: 2021-07-06

## 2021-07-06 RX ADMIN — HALOPERIDOL DECANOATE 5 MILLIGRAM(S): 100 INJECTION INTRAMUSCULAR at 11:11

## 2021-07-06 RX ADMIN — Medication 2 MILLIGRAM(S): at 11:11

## 2021-07-06 RX ADMIN — ONDANSETRON 4 MILLIGRAM(S): 8 TABLET, FILM COATED ORAL at 11:11

## 2021-07-06 NOTE — ED PROVIDER NOTE - OBJECTIVE STATEMENT
pt is 35 y/o female with PMhx of kidney stones and frequent UTIs, cyclic vomiting seen twice yesterday and still feels anxious and cannot stop dry heaving.  Pt denies fever, chills, denies hematuria,. vaginal bleeding or discharge, denies pelvic pain

## 2021-07-06 NOTE — ED PROVIDER NOTE - CHIEF COMPLAINT
Contacted wife, permission to speak with her on file, conveyed patient results and provider recommendations. Wife has no further questions or concerns at this time.    The patient is a 35y Female complaining of vomiting.

## 2021-07-06 NOTE — ED PROVIDER NOTE - PHYSICAL EXAMINATION
General:     dry heaving  Eyes: PERRL  Head:     NC/AT, EOMI, oral mucosa moist  Neck:     trachea midline  Lungs:     CTA b/l  CVS:     RRR  Abd:     epigastric tenderness  Ext:   no deformities   Skin: no rashes  Neuro: AAOx3

## 2021-07-06 NOTE — ED PROVIDER NOTE - CLINICAL SUMMARY MEDICAL DECISION MAKING FREE TEXT BOX
pt is 33 y/o female with PMhx of kidney stones and frequent UTIs, cyclic vomiting seen twice yesterday and still feels anxious and cannot stop dry heaving.  Pt denies fever, chills, denies hematuria,. vaginal bleeding or discharge, denies pelvic pain  epigastric tenderness. pt requesting no IV. wants IM medications. haldol, ativan and zofran and reassess pt is 35 y/o female with PMhx of kidney stones and frequent UTIs, cyclic vomiting seen twice yesterday and still feels anxious and cannot stop dry heaving.  Pt denies fever, chills, denies hematuria,. vaginal bleeding or discharge, denies pelvic pain  epigastric tenderness. pt requesting no IV. wants IM medications. haldol, ativan and zofran and reassess  Pt states she feels better after med administration. Worsening, continued or ANY new concerning symptoms return to the emergency department.

## 2021-07-06 NOTE — ED PROVIDER NOTE - ATTENDING CONTRIBUTION TO CARE
Andrei with [facility]. pt is 33 y/o female with PMhx of kidney stones and frequent UTIs, cyclic vomiting seen twice yesterday and still feels anxious and cannot stop dry heaving.  Pt denies fever, chills, denies hematuria,. vaginal bleeding or discharge, denies pelvic pain  epigastric tenderness. pt requesting no IV. wants IM medications. haldol, ativan and zofran and reassess  Pt states she feels better after med administration. Worsening, continued or ANY new concerning symptoms return to the emergency department.     I performed a face to face bedside interview with patient regarding history of present illness, review of symptoms and past medical history. I completed an independent physical exam.  I have discussed the patient's plan of care with Physician Assistant (PA). I agree with note as stated above, having amended the EMR as needed to reflect my findings.   This includes History of Present Illness, HIV, Past Medical/Surgical/Family/Social History, Allergies and Home Medications, Review of Systems, Physical Exam, and any Progress Notes during the time I functioned as the attending physician for this patient.

## 2021-07-06 NOTE — ED ADULT NURSE NOTE - OBJECTIVE STATEMENT
Pt presents to ED with complaints of dry heaving and anxiousness that has not gotten better since yesterday. Patient seen in ED yesterday for same complaints and d/c home. Patient has history of cyclic vomiting.

## 2021-07-06 NOTE — ED PROVIDER NOTE - NSFOLLOWUPINSTRUCTIONS_ED_ALL_ED_FT
Prescription was sent yesterday to your pharmacy.  Worsening, continued or ANY new concerning symptoms return to the emergency department.

## 2021-07-06 NOTE — ED PROVIDER NOTE - PATIENT PORTAL LINK FT
You can access the FollowMyHealth Patient Portal offered by [facility] by registering at the following website: [email or web address]. By joining [facility]’s FollowMyHealth portal, you will also be able to view your health information using other applications (apps) compatible with our system.

## 2021-07-25 ENCOUNTER — EMERGENCY (EMERGENCY)
Facility: HOSPITAL | Age: 36
LOS: 1 days | Discharge: HOME | End: 2021-07-25
Attending: INTERNAL MEDICINE | Admitting: INTERNAL MEDICINE
Payer: MEDICAID

## 2021-07-25 VITALS
TEMPERATURE: 97 F | SYSTOLIC BLOOD PRESSURE: 102 MMHG | RESPIRATION RATE: 17 BRPM | OXYGEN SATURATION: 97 % | HEART RATE: 79 BPM | HEIGHT: 66 IN | WEIGHT: 125 LBS | DIASTOLIC BLOOD PRESSURE: 72 MMHG

## 2021-07-25 DIAGNOSIS — Z98.891 HISTORY OF UTERINE SCAR FROM PREVIOUS SURGERY: Chronic | ICD-10-CM

## 2021-07-25 LAB
ALBUMIN SERPL ELPH-MCNC: 3.7 G/DL — SIGNIFICANT CHANGE UP (ref 3.3–5)
ALP SERPL-CCNC: 89 U/L — SIGNIFICANT CHANGE UP (ref 40–120)
ALT FLD-CCNC: 20 U/L — SIGNIFICANT CHANGE UP (ref 10–45)
ANION GAP SERPL CALC-SCNC: 3 MMOL/L — LOW (ref 5–17)
AST SERPL-CCNC: 22 U/L — SIGNIFICANT CHANGE UP (ref 10–40)
BASOPHILS # BLD AUTO: 0.04 K/UL — SIGNIFICANT CHANGE UP (ref 0–0.2)
BASOPHILS NFR BLD AUTO: 0.5 % — SIGNIFICANT CHANGE UP (ref 0–2)
BILIRUB SERPL-MCNC: 0.3 MG/DL — SIGNIFICANT CHANGE UP (ref 0.2–1.2)
BUN SERPL-MCNC: 17 MG/DL — SIGNIFICANT CHANGE UP (ref 7–23)
CALCIUM SERPL-MCNC: 8.7 MG/DL — SIGNIFICANT CHANGE UP (ref 8.4–10.5)
CHLORIDE SERPL-SCNC: 105 MMOL/L — SIGNIFICANT CHANGE UP (ref 96–108)
CO2 SERPL-SCNC: 30 MMOL/L — SIGNIFICANT CHANGE UP (ref 22–31)
CREAT SERPL-MCNC: 0.74 MG/DL — SIGNIFICANT CHANGE UP (ref 0.5–1.3)
EOSINOPHIL # BLD AUTO: 0.09 K/UL — SIGNIFICANT CHANGE UP (ref 0–0.5)
EOSINOPHIL NFR BLD AUTO: 1 % — SIGNIFICANT CHANGE UP (ref 0–6)
GLUCOSE SERPL-MCNC: 113 MG/DL — HIGH (ref 70–99)
HCT VFR BLD CALC: 44.3 % — SIGNIFICANT CHANGE UP (ref 34.5–45)
HGB BLD-MCNC: 14 G/DL — SIGNIFICANT CHANGE UP (ref 11.5–15.5)
IMM GRANULOCYTES NFR BLD AUTO: 0.2 % — SIGNIFICANT CHANGE UP (ref 0–1.5)
LIDOCAIN IGE QN: 210 U/L — SIGNIFICANT CHANGE UP (ref 73–393)
LYMPHOCYTES # BLD AUTO: 2.4 K/UL — SIGNIFICANT CHANGE UP (ref 1–3.3)
LYMPHOCYTES # BLD AUTO: 27.4 % — SIGNIFICANT CHANGE UP (ref 13–44)
MCHC RBC-ENTMCNC: 29.2 PG — SIGNIFICANT CHANGE UP (ref 27–34)
MCHC RBC-ENTMCNC: 31.6 GM/DL — LOW (ref 32–36)
MCV RBC AUTO: 92.3 FL — SIGNIFICANT CHANGE UP (ref 80–100)
MONOCYTES # BLD AUTO: 0.54 K/UL — SIGNIFICANT CHANGE UP (ref 0–0.9)
MONOCYTES NFR BLD AUTO: 6.2 % — SIGNIFICANT CHANGE UP (ref 2–14)
NEUTROPHILS # BLD AUTO: 5.68 K/UL — SIGNIFICANT CHANGE UP (ref 1.8–7.4)
NEUTROPHILS NFR BLD AUTO: 64.7 % — SIGNIFICANT CHANGE UP (ref 43–77)
NRBC # BLD: 0 /100 WBCS — SIGNIFICANT CHANGE UP (ref 0–0)
PLATELET # BLD AUTO: 441 K/UL — HIGH (ref 150–400)
POTASSIUM SERPL-MCNC: 5.4 MMOL/L — HIGH (ref 3.5–5.3)
POTASSIUM SERPL-SCNC: 5.4 MMOL/L — HIGH (ref 3.5–5.3)
PROT SERPL-MCNC: 7.5 G/DL — SIGNIFICANT CHANGE UP (ref 6–8.3)
RBC # BLD: 4.8 M/UL — SIGNIFICANT CHANGE UP (ref 3.8–5.2)
RBC # FLD: 13.8 % — SIGNIFICANT CHANGE UP (ref 10.3–14.5)
SODIUM SERPL-SCNC: 138 MMOL/L — SIGNIFICANT CHANGE UP (ref 135–145)
WBC # BLD: 8.77 K/UL — SIGNIFICANT CHANGE UP (ref 3.8–10.5)
WBC # FLD AUTO: 8.77 K/UL — SIGNIFICANT CHANGE UP (ref 3.8–10.5)

## 2021-07-25 PROCEDURE — 36415 COLL VENOUS BLD VENIPUNCTURE: CPT

## 2021-07-25 PROCEDURE — 96365 THER/PROPH/DIAG IV INF INIT: CPT

## 2021-07-25 PROCEDURE — 96375 TX/PRO/DX INJ NEW DRUG ADDON: CPT

## 2021-07-25 PROCEDURE — 85025 COMPLETE CBC W/AUTO DIFF WBC: CPT

## 2021-07-25 PROCEDURE — 99285 EMERGENCY DEPT VISIT HI MDM: CPT

## 2021-07-25 PROCEDURE — 83690 ASSAY OF LIPASE: CPT

## 2021-07-25 PROCEDURE — 80053 COMPREHEN METABOLIC PANEL: CPT

## 2021-07-25 PROCEDURE — 99284 EMERGENCY DEPT VISIT MOD MDM: CPT | Mod: 25

## 2021-07-25 PROCEDURE — 96376 TX/PRO/DX INJ SAME DRUG ADON: CPT

## 2021-07-25 RX ORDER — ONDANSETRON 8 MG/1
4 TABLET, FILM COATED ORAL ONCE
Refills: 0 | Status: DISCONTINUED | OUTPATIENT
Start: 2021-07-25 | End: 2021-07-28

## 2021-07-25 RX ORDER — ONDANSETRON 8 MG/1
4 TABLET, FILM COATED ORAL ONCE
Refills: 0 | Status: COMPLETED | OUTPATIENT
Start: 2021-07-25 | End: 2021-07-25

## 2021-07-25 RX ORDER — FAMOTIDINE 10 MG/ML
1 INJECTION INTRAVENOUS
Qty: 20 | Refills: 0
Start: 2021-07-25 | End: 2021-08-03

## 2021-07-25 RX ORDER — SODIUM CHLORIDE 9 MG/ML
1750 INJECTION INTRAMUSCULAR; INTRAVENOUS; SUBCUTANEOUS ONCE
Refills: 0 | Status: COMPLETED | OUTPATIENT
Start: 2021-07-25 | End: 2021-07-25

## 2021-07-25 RX ORDER — FAMOTIDINE 10 MG/ML
20 INJECTION INTRAVENOUS ONCE
Refills: 0 | Status: COMPLETED | OUTPATIENT
Start: 2021-07-25 | End: 2021-07-25

## 2021-07-25 RX ORDER — SODIUM CHLORIDE 9 MG/ML
3 INJECTION INTRAMUSCULAR; INTRAVENOUS; SUBCUTANEOUS EVERY 8 HOURS
Refills: 0 | Status: DISCONTINUED | OUTPATIENT
Start: 2021-07-25 | End: 2021-07-28

## 2021-07-25 RX ORDER — ONDANSETRON 8 MG/1
1 TABLET, FILM COATED ORAL
Qty: 30 | Refills: 0
Start: 2021-07-25 | End: 2021-08-03

## 2021-07-25 RX ADMIN — ONDANSETRON 4 MILLIGRAM(S): 8 TABLET, FILM COATED ORAL at 11:47

## 2021-07-25 RX ADMIN — Medication 1 MILLIGRAM(S): at 09:59

## 2021-07-25 RX ADMIN — Medication 1 MILLIGRAM(S): at 11:30

## 2021-07-25 RX ADMIN — ONDANSETRON 4 MILLIGRAM(S): 8 TABLET, FILM COATED ORAL at 15:10

## 2021-07-25 RX ADMIN — Medication 1 MILLIGRAM(S): at 09:58

## 2021-07-25 RX ADMIN — SODIUM CHLORIDE 1750 MILLILITER(S): 9 INJECTION INTRAMUSCULAR; INTRAVENOUS; SUBCUTANEOUS at 12:00

## 2021-07-25 RX ADMIN — Medication 1 MILLIGRAM(S): at 11:47

## 2021-07-25 RX ADMIN — FAMOTIDINE 20 MILLIGRAM(S): 10 INJECTION INTRAVENOUS at 10:30

## 2021-07-25 RX ADMIN — SODIUM CHLORIDE 1750 MILLILITER(S): 9 INJECTION INTRAMUSCULAR; INTRAVENOUS; SUBCUTANEOUS at 09:59

## 2021-07-25 RX ADMIN — ONDANSETRON 4 MILLIGRAM(S): 8 TABLET, FILM COATED ORAL at 09:58

## 2021-07-25 RX ADMIN — FAMOTIDINE 100 MILLIGRAM(S): 10 INJECTION INTRAVENOUS at 10:00
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
## 2021-07-25 NOTE — ED PROVIDER NOTE - CLINICAL SUMMARY MEDICAL DECISION MAKING FREE TEXT BOX
young female hx of cyclic vomiting syndrome labs cxr nl improved with iv pepcid zofran ativan , also iv hydrated

## 2021-07-25 NOTE — ED PROVIDER NOTE - PSYCHIATRIC, MLM
Patient to ER bed 4 to gown for evaluation. Side rails up. Report given to 
Aleta SERRATO. Alert and oriented to person, place, time/situation. normal mood and affect. no apparent risk to self or others.

## 2021-07-25 NOTE — ED ADULT NURSE NOTE - NSIMPLEMENTINTERV_GEN_ALL_ED
Implemented All Universal Safety Interventions:  [place] to call system. Call bell, personal items and telephone within reach. Instruct patient to call for assistance. Room bathroom lighting operational. Non-slip footwear when patient is off stretcher. Physically safe environment: no spills, clutter or unnecessary equipment. Stretcher in lowest position, wheels locked, appropriate side rails in place.

## 2021-07-25 NOTE — ED PROVIDER NOTE - OBJECTIVE STATEMENT
35 y f nausea vomiting hx of cyclic vomiting syndrome follow up with dr curiel 35 y f nausea vomiting hx of cyclic vomiting syndrome follow up with dr curiel  onset gradual   locations gi   duration 1 day   characteristics nausea , vomiting   context hx of cyclic vomiting syndrome   aggravating factors none   relieving factors none   timming intermittent   severity moderate

## 2021-07-25 NOTE — ED PROVIDER NOTE - PATIENT PORTAL LINK FT
You can access the FollowMyHealth Patient Portal offered by [facility] by registering at the following website: [email or web address]. By joining [facility]’s FollowMyHealth portal, you will also be able to view your health information using other applications (apps) compatible with our system.

## 2021-07-25 NOTE — ED ADULT TRIAGE NOTE - ARRIVAL INFO ADDITIONAL COMMENTS
Pt not cooperative with triage process; refused to sit to complete triage intake became irate with triage RN

## 2021-12-15 ENCOUNTER — EMERGENCY (EMERGENCY)
Facility: HOSPITAL | Age: 36
LOS: 1 days | Discharge: HOME | End: 2021-12-15
Attending: EMERGENCY MEDICINE | Admitting: EMERGENCY MEDICINE
Payer: MEDICAID

## 2021-12-15 VITALS
TEMPERATURE: 97 F | OXYGEN SATURATION: 97 % | SYSTOLIC BLOOD PRESSURE: 107 MMHG | HEART RATE: 105 BPM | RESPIRATION RATE: 19 BRPM | DIASTOLIC BLOOD PRESSURE: 75 MMHG | WEIGHT: 115.96 LBS | HEIGHT: 66 IN

## 2021-12-15 DIAGNOSIS — Z98.891 HISTORY OF UTERINE SCAR FROM PREVIOUS SURGERY: Chronic | ICD-10-CM

## 2021-12-15 LAB
ALBUMIN SERPL ELPH-MCNC: 3.8 G/DL — SIGNIFICANT CHANGE UP (ref 3.3–5)
ALP SERPL-CCNC: 65 U/L — SIGNIFICANT CHANGE UP (ref 40–120)
ALT FLD-CCNC: 14 U/L — SIGNIFICANT CHANGE UP (ref 10–45)
ANION GAP SERPL CALC-SCNC: 8 MMOL/L — SIGNIFICANT CHANGE UP (ref 5–17)
AST SERPL-CCNC: 27 U/L — SIGNIFICANT CHANGE UP (ref 10–40)
BASOPHILS # BLD AUTO: 0.04 K/UL — SIGNIFICANT CHANGE UP (ref 0–0.2)
BASOPHILS NFR BLD AUTO: 0.5 % — SIGNIFICANT CHANGE UP (ref 0–2)
BILIRUB SERPL-MCNC: 0.4 MG/DL — SIGNIFICANT CHANGE UP (ref 0.2–1.2)
BUN SERPL-MCNC: 10 MG/DL — SIGNIFICANT CHANGE UP (ref 7–23)
CALCIUM SERPL-MCNC: 8.9 MG/DL — SIGNIFICANT CHANGE UP (ref 8.4–10.5)
CHLORIDE SERPL-SCNC: 106 MMOL/L — SIGNIFICANT CHANGE UP (ref 96–108)
CO2 SERPL-SCNC: 25 MMOL/L — SIGNIFICANT CHANGE UP (ref 22–31)
CREAT SERPL-MCNC: 0.61 MG/DL — SIGNIFICANT CHANGE UP (ref 0.5–1.3)
EOSINOPHIL # BLD AUTO: 0.03 K/UL — SIGNIFICANT CHANGE UP (ref 0–0.5)
EOSINOPHIL NFR BLD AUTO: 0.4 % — SIGNIFICANT CHANGE UP (ref 0–6)
GLUCOSE SERPL-MCNC: 89 MG/DL — SIGNIFICANT CHANGE UP (ref 70–99)
HCG SERPL-ACNC: <1 MIU/ML — SIGNIFICANT CHANGE UP
HCT VFR BLD CALC: 40.5 % — SIGNIFICANT CHANGE UP (ref 34.5–45)
HGB BLD-MCNC: 13.3 G/DL — SIGNIFICANT CHANGE UP (ref 11.5–15.5)
IMM GRANULOCYTES NFR BLD AUTO: 0.4 % — SIGNIFICANT CHANGE UP (ref 0–1.5)
LYMPHOCYTES # BLD AUTO: 1.84 K/UL — SIGNIFICANT CHANGE UP (ref 1–3.3)
LYMPHOCYTES # BLD AUTO: 21.9 % — SIGNIFICANT CHANGE UP (ref 13–44)
MAGNESIUM SERPL-MCNC: 1.8 MG/DL — SIGNIFICANT CHANGE UP (ref 1.6–2.6)
MCHC RBC-ENTMCNC: 30 PG — SIGNIFICANT CHANGE UP (ref 27–34)
MCHC RBC-ENTMCNC: 32.8 GM/DL — SIGNIFICANT CHANGE UP (ref 32–36)
MCV RBC AUTO: 91.2 FL — SIGNIFICANT CHANGE UP (ref 80–100)
MONOCYTES # BLD AUTO: 0.45 K/UL — SIGNIFICANT CHANGE UP (ref 0–0.9)
MONOCYTES NFR BLD AUTO: 5.4 % — SIGNIFICANT CHANGE UP (ref 2–14)
NEUTROPHILS # BLD AUTO: 6.01 K/UL — SIGNIFICANT CHANGE UP (ref 1.8–7.4)
NEUTROPHILS NFR BLD AUTO: 71.4 % — SIGNIFICANT CHANGE UP (ref 43–77)
NRBC # BLD: 0 /100 WBCS — SIGNIFICANT CHANGE UP (ref 0–0)
PLATELET # BLD AUTO: 380 K/UL — SIGNIFICANT CHANGE UP (ref 150–400)
POTASSIUM SERPL-MCNC: 4.8 MMOL/L — SIGNIFICANT CHANGE UP (ref 3.5–5.3)
POTASSIUM SERPL-SCNC: 4.8 MMOL/L — SIGNIFICANT CHANGE UP (ref 3.5–5.3)
PROT SERPL-MCNC: 7.4 G/DL — SIGNIFICANT CHANGE UP (ref 6–8.3)
RBC # BLD: 4.44 M/UL — SIGNIFICANT CHANGE UP (ref 3.8–5.2)
RBC # FLD: 13.2 % — SIGNIFICANT CHANGE UP (ref 10.3–14.5)
SODIUM SERPL-SCNC: 139 MMOL/L — SIGNIFICANT CHANGE UP (ref 135–145)
WBC # BLD: 8.4 K/UL — SIGNIFICANT CHANGE UP (ref 3.8–10.5)
WBC # FLD AUTO: 8.4 K/UL — SIGNIFICANT CHANGE UP (ref 3.8–10.5)

## 2021-12-15 PROCEDURE — 85025 COMPLETE CBC W/AUTO DIFF WBC: CPT

## 2021-12-15 PROCEDURE — 99284 EMERGENCY DEPT VISIT MOD MDM: CPT | Mod: 25

## 2021-12-15 PROCEDURE — 96375 TX/PRO/DX INJ NEW DRUG ADDON: CPT

## 2021-12-15 PROCEDURE — 99284 EMERGENCY DEPT VISIT MOD MDM: CPT

## 2021-12-15 PROCEDURE — 84702 CHORIONIC GONADOTROPIN TEST: CPT

## 2021-12-15 PROCEDURE — 36415 COLL VENOUS BLD VENIPUNCTURE: CPT

## 2021-12-15 PROCEDURE — 83735 ASSAY OF MAGNESIUM: CPT

## 2021-12-15 PROCEDURE — 80053 COMPREHEN METABOLIC PANEL: CPT

## 2021-12-15 PROCEDURE — 96374 THER/PROPH/DIAG INJ IV PUSH: CPT

## 2021-12-15 RX ORDER — HYDROXYZINE HCL 10 MG
1 TABLET ORAL
Qty: 30 | Refills: 0
Start: 2021-12-15 | End: 2021-12-24

## 2021-12-15 RX ORDER — SODIUM CHLORIDE 9 MG/ML
1000 INJECTION INTRAMUSCULAR; INTRAVENOUS; SUBCUTANEOUS ONCE
Refills: 0 | Status: COMPLETED | OUTPATIENT
Start: 2021-12-15 | End: 2021-12-15

## 2021-12-15 RX ORDER — ONDANSETRON 8 MG/1
4 TABLET, FILM COATED ORAL ONCE
Refills: 0 | Status: COMPLETED | OUTPATIENT
Start: 2021-12-15 | End: 2021-12-15

## 2021-12-15 RX ORDER — FAMOTIDINE 10 MG/ML
20 INJECTION INTRAVENOUS ONCE
Refills: 0 | Status: COMPLETED | OUTPATIENT
Start: 2021-12-15 | End: 2021-12-15

## 2021-12-15 RX ADMIN — ONDANSETRON 4 MILLIGRAM(S): 8 TABLET, FILM COATED ORAL at 10:20

## 2021-12-15 RX ADMIN — Medication 1 MILLIGRAM(S): at 10:20

## 2021-12-15 RX ADMIN — SODIUM CHLORIDE 1000 MILLILITER(S): 9 INJECTION INTRAMUSCULAR; INTRAVENOUS; SUBCUTANEOUS at 10:22

## 2021-12-15 RX ADMIN — FAMOTIDINE 100 MILLIGRAM(S): 10 INJECTION INTRAVENOUS at 10:21

## 2021-12-15 NOTE — ED PROVIDER NOTE - ATTENDING CONTRIBUTION TO CARE
Dr. Joya: I performed a face to face bedside interview with patient regarding history of present illness, review of symptoms and past medical history. I completed an independent physical exam.  I have discussed patient's plan of care with PA.   I agree with note as stated above, having amended the EMR as needed to reflect my findings.   This includes HISTORY OF PRESENT ILLNESS, HIV, PAST MEDICAL/SURGICAL/FAMILY/SOCIAL HISTORY, ALLERGIES AND HOME MEDICATIONS, REVIEW OF SYSTEMS, PHYSICAL EXAM, and any PROGRESS NOTES during the time I functioned as the attending physician for this patient.    see mdm

## 2021-12-15 NOTE — ED PROVIDER NOTE - PATIENT PORTAL LINK FT
You can access the FollowMyHealth Patient Portal offered by [facility] by registering at the following website: [email or web address]. By joining [employer]’s FollowMyHealth portal, you will also be able to view your health information using other applications (apps) compatible with our system.

## 2021-12-15 NOTE — ED PROVIDER NOTE - CLINICAL SUMMARY MEDICAL DECISION MAKING FREE TEXT BOX
Dr. Joya: 36F h/o cyclic vomiting syndrome, follows up with GI Dr. Alejandra, h/o C section p/w nausea, vomiting abdo cramping states feels just like usual cyclic vomiting, requesting zofran and ativan. Has zofran at home. Normal BMs. No fevers, chest pain, sob, dysuria, hematuria. On exam pt is very well appearing, nad, MMM, RRR, CTAB, abdo soft/nt/nd, no cvat. Labs check, pt hydrated, treated symptomatically, feels better on reassessment, will dc home.

## 2021-12-15 NOTE — ED ADULT NURSE NOTE - NSICDXPASTMEDICALHX_GEN_ALL_CORE_FT
PAST MEDICAL HISTORY:  Gastritis     Kidney stone     Urinary tract infection without hematuria, site unspecified frequent reoccuring UTIs as per patient

## 2021-12-15 NOTE — ED PROVIDER NOTE - OBJECTIVE STATEMENT
37 y/o F with h/o Cyclic vomiting syndrome, Gastritis following GI Dr. Alejandra c/o NBNB n/v x 5 episodes starting this morning. Currently on a Zofran/Pepcid regiment at home not helping. Last Endoscopy was 2021 revealing Gastritis. Denies fever, chills, headache, chest pain, shortness of breath, abdominal pain, diarrhea, weakness. Appears VERY well. no distress, no active vomiting here.   Surg hx:    (+) marijuana smoker

## 2021-12-15 NOTE — ED ADULT NURSE NOTE - OBJECTIVE STATEMENT
Pt presents to ED from home for nausea and vomiting. Pt with history of cyclical vomiting reports 5 episodes of vomiting since 0500 this morning. Denies fever.

## 2021-12-22 ENCOUNTER — EMERGENCY (EMERGENCY)
Facility: HOSPITAL | Age: 36
LOS: 1 days | Discharge: HOME | End: 2021-12-22
Attending: INTERNAL MEDICINE | Admitting: INTERNAL MEDICINE
Payer: MEDICAID

## 2021-12-22 VITALS
TEMPERATURE: 99 F | OXYGEN SATURATION: 95 % | HEART RATE: 84 BPM | WEIGHT: 115.96 LBS | HEIGHT: 66 IN | DIASTOLIC BLOOD PRESSURE: 80 MMHG | SYSTOLIC BLOOD PRESSURE: 113 MMHG | RESPIRATION RATE: 20 BRPM

## 2021-12-22 VITALS
HEART RATE: 82 BPM | DIASTOLIC BLOOD PRESSURE: 68 MMHG | TEMPERATURE: 99 F | RESPIRATION RATE: 18 BRPM | SYSTOLIC BLOOD PRESSURE: 102 MMHG | OXYGEN SATURATION: 97 %

## 2021-12-22 DIAGNOSIS — Z98.891 HISTORY OF UTERINE SCAR FROM PREVIOUS SURGERY: Chronic | ICD-10-CM

## 2021-12-22 LAB
ALBUMIN SERPL ELPH-MCNC: 3.8 G/DL — SIGNIFICANT CHANGE UP (ref 3.3–5)
ALP SERPL-CCNC: 63 U/L — SIGNIFICANT CHANGE UP (ref 40–120)
ALT FLD-CCNC: 13 U/L — SIGNIFICANT CHANGE UP (ref 10–45)
ANION GAP SERPL CALC-SCNC: 11 MMOL/L — SIGNIFICANT CHANGE UP (ref 5–17)
APPEARANCE UR: CLEAR — SIGNIFICANT CHANGE UP
AST SERPL-CCNC: 14 U/L — SIGNIFICANT CHANGE UP (ref 10–40)
BACTERIA # UR AUTO: ABNORMAL /HPF
BASOPHILS # BLD AUTO: 0.02 K/UL — SIGNIFICANT CHANGE UP (ref 0–0.2)
BASOPHILS NFR BLD AUTO: 0.3 % — SIGNIFICANT CHANGE UP (ref 0–2)
BILIRUB SERPL-MCNC: 0.3 MG/DL — SIGNIFICANT CHANGE UP (ref 0.2–1.2)
BILIRUB UR-MCNC: NEGATIVE — SIGNIFICANT CHANGE UP
BUN SERPL-MCNC: 12 MG/DL — SIGNIFICANT CHANGE UP (ref 7–23)
CALCIUM SERPL-MCNC: 9 MG/DL — SIGNIFICANT CHANGE UP (ref 8.4–10.5)
CHLORIDE SERPL-SCNC: 104 MMOL/L — SIGNIFICANT CHANGE UP (ref 96–108)
CO2 SERPL-SCNC: 25 MMOL/L — SIGNIFICANT CHANGE UP (ref 22–31)
COLOR SPEC: YELLOW — SIGNIFICANT CHANGE UP
COMMENT - URINE: SIGNIFICANT CHANGE UP
CREAT SERPL-MCNC: 0.8 MG/DL — SIGNIFICANT CHANGE UP (ref 0.5–1.3)
DIFF PNL FLD: ABNORMAL
EOSINOPHIL # BLD AUTO: 0.03 K/UL — SIGNIFICANT CHANGE UP (ref 0–0.5)
EOSINOPHIL NFR BLD AUTO: 0.5 % — SIGNIFICANT CHANGE UP (ref 0–6)
EPI CELLS # UR: ABNORMAL
FLUAV AG NPH QL: SIGNIFICANT CHANGE UP
FLUBV AG NPH QL: SIGNIFICANT CHANGE UP
GLUCOSE SERPL-MCNC: 99 MG/DL — SIGNIFICANT CHANGE UP (ref 70–99)
GLUCOSE UR QL: NEGATIVE — SIGNIFICANT CHANGE UP
HCT VFR BLD CALC: 40.5 % — SIGNIFICANT CHANGE UP (ref 34.5–45)
HGB BLD-MCNC: 13.2 G/DL — SIGNIFICANT CHANGE UP (ref 11.5–15.5)
HYALINE CASTS # UR AUTO: ABNORMAL
IMM GRANULOCYTES NFR BLD AUTO: 0.3 % — SIGNIFICANT CHANGE UP (ref 0–1.5)
KETONES UR-MCNC: ABNORMAL
LEUKOCYTE ESTERASE UR-ACNC: NEGATIVE — SIGNIFICANT CHANGE UP
LIDOCAIN IGE QN: 117 U/L — SIGNIFICANT CHANGE UP (ref 73–393)
LYMPHOCYTES # BLD AUTO: 0.49 K/UL — LOW (ref 1–3.3)
LYMPHOCYTES # BLD AUTO: 8 % — LOW (ref 13–44)
MCHC RBC-ENTMCNC: 29.9 PG — SIGNIFICANT CHANGE UP (ref 27–34)
MCHC RBC-ENTMCNC: 32.6 GM/DL — SIGNIFICANT CHANGE UP (ref 32–36)
MCV RBC AUTO: 91.6 FL — SIGNIFICANT CHANGE UP (ref 80–100)
MONOCYTES # BLD AUTO: 0.83 K/UL — SIGNIFICANT CHANGE UP (ref 0–0.9)
MONOCYTES NFR BLD AUTO: 13.6 % — SIGNIFICANT CHANGE UP (ref 2–14)
NEUTROPHILS # BLD AUTO: 4.7 K/UL — SIGNIFICANT CHANGE UP (ref 1.8–7.4)
NEUTROPHILS NFR BLD AUTO: 77.3 % — HIGH (ref 43–77)
NITRITE UR-MCNC: NEGATIVE — SIGNIFICANT CHANGE UP
NRBC # BLD: 0 /100 WBCS — SIGNIFICANT CHANGE UP (ref 0–0)
PH UR: 6 — SIGNIFICANT CHANGE UP (ref 5–8)
PLATELET # BLD AUTO: 276 K/UL — SIGNIFICANT CHANGE UP (ref 150–400)
POTASSIUM SERPL-MCNC: 4.3 MMOL/L — SIGNIFICANT CHANGE UP (ref 3.5–5.3)
POTASSIUM SERPL-SCNC: 4.3 MMOL/L — SIGNIFICANT CHANGE UP (ref 3.5–5.3)
PROT SERPL-MCNC: 7.2 G/DL — SIGNIFICANT CHANGE UP (ref 6–8.3)
PROT UR-MCNC: 15
RBC # BLD: 4.42 M/UL — SIGNIFICANT CHANGE UP (ref 3.8–5.2)
RBC # FLD: 13.3 % — SIGNIFICANT CHANGE UP (ref 10.3–14.5)
RBC CASTS # UR COMP ASSIST: SIGNIFICANT CHANGE UP /HPF (ref 0–4)
RSV RNA NPH QL NAA+NON-PROBE: SIGNIFICANT CHANGE UP
SARS-COV-2 RNA SPEC QL NAA+PROBE: DETECTED
SODIUM SERPL-SCNC: 140 MMOL/L — SIGNIFICANT CHANGE UP (ref 135–145)
SP GR SPEC: 1.02 — SIGNIFICANT CHANGE UP (ref 1.01–1.02)
UROBILINOGEN FLD QL: NEGATIVE — SIGNIFICANT CHANGE UP
WBC # BLD: 6.09 K/UL — SIGNIFICANT CHANGE UP (ref 3.8–10.5)
WBC # FLD AUTO: 6.09 K/UL — SIGNIFICANT CHANGE UP (ref 3.8–10.5)
WBC UR QL: SIGNIFICANT CHANGE UP /HPF (ref 0–5)

## 2021-12-22 PROCEDURE — 99284 EMERGENCY DEPT VISIT MOD MDM: CPT

## 2021-12-22 PROCEDURE — 87637 SARSCOV2&INF A&B&RSV AMP PRB: CPT

## 2021-12-22 PROCEDURE — 83690 ASSAY OF LIPASE: CPT

## 2021-12-22 PROCEDURE — 81001 URINALYSIS AUTO W/SCOPE: CPT

## 2021-12-22 PROCEDURE — 96374 THER/PROPH/DIAG INJ IV PUSH: CPT

## 2021-12-22 PROCEDURE — 80053 COMPREHEN METABOLIC PANEL: CPT

## 2021-12-22 PROCEDURE — 36415 COLL VENOUS BLD VENIPUNCTURE: CPT

## 2021-12-22 PROCEDURE — 71045 X-RAY EXAM CHEST 1 VIEW: CPT | Mod: 26

## 2021-12-22 PROCEDURE — 85025 COMPLETE CBC W/AUTO DIFF WBC: CPT

## 2021-12-22 PROCEDURE — 71045 X-RAY EXAM CHEST 1 VIEW: CPT

## 2021-12-22 PROCEDURE — 99284 EMERGENCY DEPT VISIT MOD MDM: CPT | Mod: 25

## 2021-12-22 PROCEDURE — 96375 TX/PRO/DX INJ NEW DRUG ADDON: CPT

## 2021-12-22 RX ORDER — ONDANSETRON 8 MG/1
1 TABLET, FILM COATED ORAL
Qty: 30 | Refills: 0
Start: 2021-12-22 | End: 2021-12-31

## 2021-12-22 RX ORDER — ONDANSETRON 8 MG/1
4 TABLET, FILM COATED ORAL ONCE
Refills: 0 | Status: COMPLETED | OUTPATIENT
Start: 2021-12-22 | End: 2021-12-22

## 2021-12-22 RX ORDER — ACETAMINOPHEN 500 MG
975 TABLET ORAL ONCE
Refills: 0 | Status: COMPLETED | OUTPATIENT
Start: 2021-12-22 | End: 2021-12-22

## 2021-12-22 RX ORDER — FAMOTIDINE 10 MG/ML
1 INJECTION INTRAVENOUS
Qty: 20 | Refills: 0
Start: 2021-12-22 | End: 2021-12-31

## 2021-12-22 RX ORDER — FAMOTIDINE 10 MG/ML
20 INJECTION INTRAVENOUS ONCE
Refills: 0 | Status: COMPLETED | OUTPATIENT
Start: 2021-12-22 | End: 2021-12-22

## 2021-12-22 RX ORDER — ACETAMINOPHEN 500 MG
1 TABLET ORAL
Qty: 30 | Refills: 0
Start: 2021-12-22 | End: 2021-12-31

## 2021-12-22 RX ORDER — SODIUM CHLORIDE 9 MG/ML
1650 INJECTION INTRAMUSCULAR; INTRAVENOUS; SUBCUTANEOUS ONCE
Refills: 0 | Status: COMPLETED | OUTPATIENT
Start: 2021-12-22 | End: 2021-12-22

## 2021-12-22 RX ADMIN — SODIUM CHLORIDE 1650 MILLILITER(S): 9 INJECTION INTRAMUSCULAR; INTRAVENOUS; SUBCUTANEOUS at 05:16

## 2021-12-22 RX ADMIN — FAMOTIDINE 100 MILLIGRAM(S): 10 INJECTION INTRAVENOUS at 05:16

## 2021-12-22 RX ADMIN — ONDANSETRON 4 MILLIGRAM(S): 8 TABLET, FILM COATED ORAL at 05:16

## 2021-12-22 RX ADMIN — Medication 1 MILLIGRAM(S): at 05:16

## 2021-12-22 RX ADMIN — Medication 975 MILLIGRAM(S): at 05:16

## 2021-12-22 NOTE — ED PROVIDER NOTE - PHYSICAL EXAMINATION
General:     NAD, well-nourished, well-appearing  Head:     NC/AT, EOMI, oral mucosa moist  Neck:     trachea midline  Lungs:     CTA b/l, no w/r/r  CVS:     S1S2, RRR, no m/g/r  Abd:     +BS, s/nt/nd Hyperactive bowel sounds, no organomegaly  Ext:    2+ radial and pedal pulses, no c/c/e  Neuro: AAOx3, no sensory/motor deficits

## 2021-12-22 NOTE — ED PROVIDER NOTE - NSFOLLOWUPINSTRUCTIONS_ED_ALL_ED_FT
Rest, drink plenty of fluids  Advance activity as tolerated  Continue all previously prescribed medications as directed  Follow up with your PMD 2-3 days- bring copies of your results  Return to the ER for worsening    ACUTE NAUSEA AND VOMITING - AfterCare(R) Instructions(ER/ED)           Acute Nausea and Vomiting    WHAT YOU NEED TO KNOW:    Acute nausea and vomiting start suddenly, worsen quickly, and last a short time.    DISCHARGE INSTRUCTIONS:    Return to the emergency department if:   •You see blood in your vomit or your bowel movements.      •You have sudden, severe pain in your chest and upper abdomen after hard vomiting or retching.      •You have swelling in your neck and chest.       •You are dizzy, cold, and thirsty and your eyes and mouth are dry.      •You are urinating very little or not at all.      •You have muscle weakness, leg cramps, and trouble breathing.       •Your heart is beating much faster than normal.       •You continue to vomit for more than 48 hours.       Contact your healthcare provider if:   •You have frequent dry heaves (vomiting but nothing comes out).      •Your nausea and vomiting does not get better or go away after you use medicine.      •You have questions or concerns about your condition or treatment.      Medicines: You may need any of the following:   •Medicines may be given to calm your stomach and stop your vomiting. You may also need medicines to help you feel more relaxed or to stop nausea and vomiting caused by motion sickness.      •Gastrointestinal stimulants are used to help empty your stomach and bowels. This may help decrease nausea and vomiting.      •Take your medicine as directed. Contact your healthcare provider if you think your medicine is not helping or if you have side effects. Tell him or her if you are allergic to any medicine. Keep a list of the medicines, vitamins, and herbs you take. Include the amounts, and when and why you take them. Bring the list or the pill bottles to follow-up visits. Carry your medicine list with you in case of an emergency.      Prevent or manage acute nausea and vomiting:   •Do not drink alcohol. Alcohol may upset or irritate your stomach. Too much alcohol can also cause acute nausea and vomiting.      •Control stress. Headaches due to stress may cause nausea and vomiting. Find ways to relax and manage your stress. Get more rest and sleep.      •Drink more liquids as directed. Vomiting can lead to dehydration. It is important to drink more liquids to help replace lost body fluids. Ask your healthcare provider how much liquid to drink each day and which liquids are best for you. Your provider may recommend that you drink an oral rehydration solution (ORS). ORS contains water, salts, and sugar that are needed to replace the lost body fluids. Ask what kind of ORS to use, how much to drink, and where to get it.      •Eat smaller meals, more often. Eat small amounts of food every 2 to 3 hours, even if you are not hungry. Food in your stomach may decrease your nausea.      •Talk to your healthcare provider before you take over-the-counter (OTC) medicines. These medicines can cause serious problems if you use certain other medicines, or you have a medical condition. You may have problems if you use too much or use them for longer than the label says. Follow directions on the label carefully.       Follow up with your healthcare provider as directed: Write down your questions so you remember to ask them during your follow-up visits.       © Copyright [employer] 2021           back to top                          © Copyright [employer] 2021

## 2021-12-22 NOTE — ED ADULT NURSE NOTE - OBJECTIVE STATEMENT
35y/o female with history of gastritis, walked into ED a&ox4 c/o vomiting. Patient reports fever/chills and vomiting since yesterday. Also with associated central abdominal discomfort. States this feels different than her usual gastritis. Denies sick contacts. No blood in vomit, diarrhea, urinary symptoms, sob, cough, cp.

## 2021-12-22 NOTE — ED PROVIDER NOTE - CLINICAL SUMMARY MEDICAL DECISION MAKING FREE TEXT BOX
36 y f hx of celiac dz, anxiety cc nausea vomiting body aches fever since yesterday morning  onset gradual   locations general   duration 2 days  characteristics nausea vomiting  context pt fully covid vaccinated , no exposure  aggravating factors none  relieving factors none  timming intermittent   severity moderate  abd soft NT  plan labs , ua, pepcid zofran ativan 1 mg pt very anxious iv hydration reeval

## 2021-12-22 NOTE — ED PROVIDER NOTE - PATIENT PORTAL LINK FT
You can access the FollowMyHealth Patient Portal offered by [facility] by registering at the following website: [email or web address]. By joining [facility]’s FollowMyHealth portal, you will also be able to view your health information using other applications (apps) compatible with our system.

## 2021-12-22 NOTE — ED PROVIDER NOTE - OBJECTIVE STATEMENT
36 y f hx of celiac dz, anxiety cc nausea vomiting body aches fever since yesterday morning  onset gradual   locations general   duration 2 days  characteristics nausea vomiting  context pt fully covid vaccinated , no exposure  aggravating factors none  relieving factors none  timming intermittent   severity moderate

## 2022-02-15 NOTE — ED PROVIDER NOTE - CROS ED CARDIOVAS ALL NEG
"Comprehensive Intake Entered On:  2/16/2021 2:40 PM CST    Performed On:  2/16/2021 2:36 PM CST by Bruce Whiting CMA               Summary   Chief Complaint :   Verbal consent given for a telephone visit.  Pt is calling for refills on his pain medication and albuterol inhaler. Pt is also asking for a 'Note for my landlord regarding my condition\"    Menstrual Status :   N/A   Height Measured :   72 in(Converted to: 6 ft 0 in, 182.88 cm)    Height/Length Estimated :   72 in(Converted to: 6 ft 0 in, 182.88 cm)    Bruce Whiting CMA - 2/16/2021 2:36 PM CST   Health Status   Allergies Verified? :   Yes   Medication History Verified? :   Yes   Medical History Verified? :   Yes   Pre-Visit Planning Status :   Not completed   Tobacco Use? :   Current every day smoker   Tobacco Cessation Review :   Not ready to quit   Bruce Whiting CMA - 2/16/2021 2:36 PM CST   Problems   (As Of: 2/16/2021 2:40:19 PM CST)   Problems(Active)    Anxiety (SNOMED CT  :74511415 )  Name of Problem:   Anxiety ; Recorder:   Genia Mac RN; Confirmation:   Confirmed ; Classification:   Medical ; Code:   [id number] ; Contributor System:   PowerChart ; Last Updated:   10/14/2016 8:14 AM CDT ; Life Cycle Date:   12/15/2010 ; Life Cycle Status:   Active ; Vocabulary:   SNOMED CT        Asthma, moderate persistent (SNOMED CT  :4936610280 )  Name of Problem:   Asthma, moderate persistent ; Recorder:   Parris Lugo; Confirmation:   Confirmed ; Classification:   Medical ; Code:   [id number] ; Contributor System:   PowerChart ; Last Updated:   5/2/2018 3:13 PM CDT ; Life Cycle Status:   Active ; Responsible Provider:   Parris Lugo; Vocabulary:   SNOMED CT        Cerebellar tonsillar ectopia (SNOMED CT  :19346479 )  Name of Problem:   Cerebellar tonsillar ectopia ; Recorder:   Genia Mac RN; Confirmation:   Confirmed ; Classification:   Medical ; Code:   [id number] ; Contributor System:   PowerChart ; Last Updated:   8/4/2015 11:17 AM CDT " ; Life Cycle Date:   12/15/2010 ; Life Cycle Status:   Active ; Vocabulary:   SNOMED CT        Chronic back pain (SNOMED CT  :352264005 )  Name of Problem:   Chronic back pain ; Recorder:   Nena King MD; Confirmation:   Confirmed ; Classification:   Medical ; Code:   672497694 ; Contributor System:   PowerChart ; Last Updated:   6/15/2020 2:53 PM CDT ; Life Cycle Status:   Active ; Responsible Provider:   Nena King MD; Vocabulary:   SNOMED CT        Chronic insomnia (SNOMED CT  :624277986 )  Name of Problem:   Chronic insomnia ; Recorder:   Randall Salcedo MD; Confirmation:   Confirmed ; Classification:   Medical ; Code:   609829714 ; Contributor System:   PowerChart ; Last Updated:   6/29/2017 4:36 PM CDT ; Life Cycle Date:   6/29/2017 ; Life Cycle Status:   Active ; Responsible Provider:   Randall Salcedo MD; Vocabulary:   SNOMED CT        Degenerative lumbar disc (SNOMED CT  :61862173 )  Name of Problem:   Degenerative lumbar disc ; Recorder:   Mata Hearn MD; Confirmation:   Confirmed ; Classification:   Medical ; Code:   08709516 ; Contributor System:   PowerChart ; Last Updated:   6/29/2020 1:21 PM CDT ; Life Cycle Date:   6/29/2020 ; Life Cycle Status:   Active ; Responsible Provider:   Mata Hearn MD; Vocabulary:   SNOMED CT        Dysplastic nevus (SNOMED CT  :3426118146 )  Name of Problem:   Dysplastic nevus ; Onset Date:   2/1/2018 ; Recorder:   Parris Lugo; Confirmation:   Confirmed ; Classification:   Medical ; Code:   1006241146 ; Contributor System:   PowerChart ; Last Updated:   5/2/2018 3:03 PM CDT ; Life Cycle Status:   Active ; Responsible Provider:   Parris Lugo; Vocabulary:   SNOMED CT   ; Comments:        5/2/2018 3:03 PM - Parris Lugo  removed from back      Low back pain (SNOMED CT  :355903870 )  Name of Problem:   Low back pain ; Recorder:   Cricket Jessica PA-C; Confirmation:   Confirmed ; Classification:   Medical ; Code:   381473314 ;  Contributor System:   PowerChart ; Last Updated:   5/24/2017 7:04 PM CDT ; Life Cycle Status:   Active ; Responsible Provider:   Cricket Jessica PA-C; Vocabulary:   SNOMED CT        Marijuana use (SNOMED CT  :0245539644 )  Name of Problem:   Marijuana use ; Recorder:   Nena King MD; Confirmation:   Confirmed ; Classification:   Medical ; Code:   7740769247 ; Contributor System:   PowerChart ; Last Updated:   3/14/2018 5:46 PM CDT ; Life Cycle Date:   3/14/2018 ; Life Cycle Status:   Active ; Responsible Provider:   Nena King MD; Vocabulary:   SNOMED CT        Myalgia (SNOMED CT  :284401943 )  Name of Problem:   Myalgia ; Recorder:   Nena King MD; Confirmation:   Confirmed ; Classification:   Medical ; Code:   307927984 ; Contributor System:   PowerChart ; Last Updated:   2/13/2018 10:09 PM CST ; Life Cycle Status:   Active ; Responsible Provider:   Nena King MD; Vocabulary:   SNOMED CT        Myofascial pain syndrome (SNOMED CT  :0145675628 )  Name of Problem:   Myofascial pain syndrome ; Recorder:   Mata Hearn MD; Confirmation:   Confirmed ; Classification:   Medical ; Code:   5075900815 ; Contributor System:   PowerChart ; Last Updated:   11/30/2020 9:33 PM CST ; Life Cycle Date:   11/11/2020 ; Life Cycle Status:   Active ; Responsible Provider:   Mata Hearn MD; Vocabulary:   SNOMED CT        Obese (SNOMED CT  :6493313828 )  Name of Problem:   Obese ; Recorder:   SYSTEM; Confirmation:   Probable ; Classification:   Medical ; Code:   0674760367 ; Contributor System:   PowerChart ; Last Updated:   5/7/2018 7:34 AM CDT ; Life Cycle Status:   Active ; Vocabulary:   SNOMED CT        Sebaceous cyst (SNOMED CT  :9909145683 )  Name of Problem:   Sebaceous cyst ; Recorder:   Nena King MD; Confirmation:   Confirmed ; Classification:   Medical ; Code:   1211375170 ; Contributor System:   PowerChart ; Last Updated:   5/15/2018 7:52 PM CDT ; Life Cycle Status:   Active ;  Responsible Provider:   Nena King MD; Vocabulary:   SNOMED CT        Smoking (SNOMED CT  :950149056 )  Name of Problem:   Smoking ; Recorder:   Randall Salcedo MD; Confirmation:   Confirmed ; Classification:   Medical ; Code:   675299686 ; Contributor System:   [employer] ; Last Updated:   6/16/2015 3:29 PM CDT ; Life Cycle Date:   6/16/2015 ; Life Cycle Status:   Active ; Responsible Provider:   Randall Salcedo MD; Vocabulary:   SNOMED CT        Tension headache (SNOMED CT  :0795434965 )  Name of Problem:   Tension headache ; Recorder:   Nena King MD; Confirmation:   Confirmed ; Classification:   Medical ; Code:   6827121406 ; Contributor System:   [employer] ; Last Updated:   5/31/2018 6:13 PM CDT ; Life Cycle Status:   Active ; Responsible Provider:   Nena King MD; Vocabulary:   SNOMED CT        Tobacco user (SNOMED CT  :648700056 )  Name of Problem:   Tobacco user ; Recorder:   SYSTEM; Confirmation:   Probable ; Classification:   Medical ; Code:   984491883 ; Last Updated:   6/23/2015 11:35 AM CDT ; Life Cycle Date:   6/16/2015 ; Life Cycle Status:   Active ; Vocabulary:   SNOMED CT        Trochanteric bursitis, right hip (SNOMED CT  :76201091 )  Name of Problem:   Trochanteric bursitis, right hip ; Recorder:   Nena King MD; Confirmation:   Confirmed ; Classification:   Medical ; Code:   67443480 ; Contributor System:   [employer] ; Last Updated:   2/16/2018 9:31 PM CST ; Life Cycle Status:   Active ; Responsible Provider:   Nena King MD; Vocabulary:   SNOMED CT          Meds / Allergies   (As Of: 2/16/2021 2:40:19 PM CST)   Allergies (Active)   Bananas  Estimated Onset Date:   Unspecified ; Created By:   Rowena Hooker RN; Reaction Status:   Active ; Category:   Drug ; Substance:   Bananas ; Type:   Allergy ; Updated By:   Rowena Hooker RN; Reviewed Date:   2/16/2021 2:40 PM CST      Bee Stings  Estimated Onset Date:   Unspecified ; Created By:   Ruthie Nam CMA;  Reaction Status:   Active ; Category:   Drug ; Substance:   Bee Stings ; Type:   Allergy ; Updated By:   Ruthie Nam CMA; Reviewed Date:   2/16/2021 2:40 PM CST      Latex  Estimated Onset Date:   Unspecified ; Created By:   Ruthie Nam CMA; Reaction Status:   Active ; Category:   Drug ; Substance:   Latex ; Type:   Allergy ; Updated By:   Ruthie Nam CMA; Reviewed Date:   2/16/2021 2:40 PM CST        Medication List   (As Of: 2/16/2021 2:40:19 PM CST)   Prescription/Discharge Order    albuterol  :   albuterol ; Status:   Prescribed ; Ordered As Mnemonic:   Ventolin HFA 90 mcg/inh inhalation aerosol ; Simple Display Line:   2 puff(s), NEB, qid, PRN: AS NEEDED FOR WHEEZING, 1 EA, 0 Refill(s) ; Ordering Provider:   Mata Hearn MD; Catalog Code:   albuterol ; Order Dt/Tm:   1/5/2021 3:04:16 PM CST          oxyCODONE  :   oxyCODONE ; Status:   Prescribed ; Ordered As Mnemonic:   oxyCODONE 5 mg oral tablet ; Simple Display Line:   5 mg, 1 tab(s), Oral, q8 hrs, PRN: as needed for pain, 20 tab(s), 0 Refill(s) ; Ordering Provider:   Mata Hearn MD; Catalog Code:   oxyCODONE ; Order Dt/Tm:   1/5/2021 3:02:54 PM CST          diclofenac topical  :   diclofenac topical ; Status:   Prescribed ; Ordered As Mnemonic:   diclofenac 1% topical gel ; Simple Display Line:   2 gm, Topical, qid, apply to lower back, 240 gm, 2 Refill(s) ; Ordering Provider:   Mata Hearn MD; Catalog Code:   diclofenac topical ; Order Dt/Tm:   12/22/2020 3:38:26 PM CST          diazePAM  :   diazePAM ; Status:   Prescribed ; Ordered As Mnemonic:   diazePAM 5 mg oral tablet ; Simple Display Line:   5 mg, 1 tab(s), Oral, daily, PRN: as needed for anxiety, 12 tab(s), 0 Refill(s) ; Ordering Provider:   Mata Hearn MD; Catalog Code:   diazePAM ; Order Dt/Tm:   11/30/2020 9:48:55 PM CST          ketorolac ophthalmic  :   ketorolac ophthalmic ; Status:   Prescribed ; Ordered As Mnemonic:   Acular 0.5% ophthalmic solution ;  Simple Display Line:   1 drop(s), right eye, QID, for 7 day(s), PRN: for itching, 10 mL, 0 Refill(s) ; Ordering Provider:   Cricket Jessica PA-C; Catalog Code:   ketorolac ophthalmic ; Order Dt/Tm:   10/9/2020 11:52:51 AM CDT          Miscellaneous Rx Supply  :   Miscellaneous Rx Supply ; Status:   Prescribed ; Ordered As Mnemonic:   Orthotics ; Simple Display Line:   See Instructions, to use as directed with shoes, 2 EA, 0 Refill(s) ; Ordering Provider:   Mata Hearn MD; Catalog Code:   Miscellaneous Rx Supply ; Order Dt/Tm:   2/13/2020 3:16:54 PM CST            Home Meds    acetaminophen  :   acetaminophen ; Status:   Documented ; Ordered As Mnemonic:   acetaminophen 500 mg oral tablet ; Simple Display Line:   1,000 mg, 2 tab(s), Oral, q6 hrs, PRN: as needed for pain, 0 Refill(s) ; Catalog Code:   acetaminophen ; Order Dt/Tm:   11/6/2019 1:37:34 PM CST            ID Risk Screen   Recent Travel History :   No recent travel   Family Member Travel History :   No recent travel   Other Exposure to Infectious Disease :   Unknown   COVID-19 Testing Status :   No positive COVID-19 test   Bruce Whiting CMA - 2/16/2021 2:36 PM CST   Social History   Social History   (As Of: 2/16/2021 2:40:19 PM CST)   Alcohol:        Past   Comments:  6/16/2015 3:17 PM - Randall Salcedo MD: 6 drinks pks per weekend   (Last Updated: 2/13/2020 3:10:22 PM CST by Fariba Roth MA)          Tobacco:        10 or more cigarettes (1/2 pack or more)/day in last 30 days   (Last Updated: 11/30/2020 2:34:51 PM CST by Fariba Roth MA)          Electronic Cigarette/Vaping:        Electronic Cigarette Use: Never.   (Last Updated: 11/30/2020 2:35:11 PM CST by Fariba Roth MA)          Substance Abuse:        Current   Comments:  6/16/2015 3:18 PM - Randall Salcedo MD: Marijuana   (Last Updated: 6/16/2015 3:18:44 PM CDT by Randall Salcedo MD)          Employment/School:        Work/School description: disabled.   Comments:  2/21/2011 5:54  PM - Wong Bashir MD: has 1 child- 10 weeks old -   (Last Updated: 2/21/2011 5:54:14 PM CST by Wong Bashir MD)          Home/Environment:        Lives with Self, Significant other.   Comments:  2/21/2011 5:54 PM - Wong Bashir MD: 2 kids in household   (Last Updated: 2/21/2011 5:54:34 PM CST by Wong Bashir MD)   negative...

## 2022-02-22 ENCOUNTER — EMERGENCY (EMERGENCY)
Facility: HOSPITAL | Age: 37
LOS: 1 days | Discharge: HOME | End: 2022-02-22
Attending: EMERGENCY MEDICINE | Admitting: EMERGENCY MEDICINE
Payer: MEDICAID

## 2022-02-22 VITALS
TEMPERATURE: 99 F | SYSTOLIC BLOOD PRESSURE: 118 MMHG | HEART RATE: 81 BPM | OXYGEN SATURATION: 99 % | RESPIRATION RATE: 18 BRPM | WEIGHT: 117.95 LBS | DIASTOLIC BLOOD PRESSURE: 75 MMHG | HEIGHT: 66 IN

## 2022-02-22 VITALS
SYSTOLIC BLOOD PRESSURE: 99 MMHG | OXYGEN SATURATION: 98 % | RESPIRATION RATE: 17 BRPM | DIASTOLIC BLOOD PRESSURE: 65 MMHG | HEART RATE: 83 BPM

## 2022-02-22 DIAGNOSIS — Z98.891 HISTORY OF UTERINE SCAR FROM PREVIOUS SURGERY: Chronic | ICD-10-CM

## 2022-02-22 LAB
ALBUMIN SERPL ELPH-MCNC: 3.8 G/DL — SIGNIFICANT CHANGE UP (ref 3.3–5)
ALP SERPL-CCNC: 63 U/L — SIGNIFICANT CHANGE UP (ref 40–120)
ALT FLD-CCNC: 14 U/L — SIGNIFICANT CHANGE UP (ref 10–45)
ANION GAP SERPL CALC-SCNC: 10 MMOL/L — SIGNIFICANT CHANGE UP (ref 5–17)
APPEARANCE UR: CLEAR — SIGNIFICANT CHANGE UP
AST SERPL-CCNC: 20 U/L — SIGNIFICANT CHANGE UP (ref 10–40)
BACTERIA # UR AUTO: ABNORMAL /HPF
BASOPHILS # BLD AUTO: 0.03 K/UL — SIGNIFICANT CHANGE UP (ref 0–0.2)
BASOPHILS NFR BLD AUTO: 0.4 % — SIGNIFICANT CHANGE UP (ref 0–2)
BILIRUB SERPL-MCNC: 0.2 MG/DL — SIGNIFICANT CHANGE UP (ref 0.2–1.2)
BILIRUB UR-MCNC: NEGATIVE — SIGNIFICANT CHANGE UP
BUN SERPL-MCNC: 18 MG/DL — SIGNIFICANT CHANGE UP (ref 7–23)
CALCIUM SERPL-MCNC: 9 MG/DL — SIGNIFICANT CHANGE UP (ref 8.4–10.5)
CHLORIDE SERPL-SCNC: 106 MMOL/L — SIGNIFICANT CHANGE UP (ref 96–108)
CO2 SERPL-SCNC: 25 MMOL/L — SIGNIFICANT CHANGE UP (ref 22–31)
COLOR SPEC: YELLOW — SIGNIFICANT CHANGE UP
CREAT SERPL-MCNC: 0.63 MG/DL — SIGNIFICANT CHANGE UP (ref 0.5–1.3)
DIFF PNL FLD: NEGATIVE — SIGNIFICANT CHANGE UP
EOSINOPHIL # BLD AUTO: 0.11 K/UL — SIGNIFICANT CHANGE UP (ref 0–0.5)
EOSINOPHIL NFR BLD AUTO: 1.4 % — SIGNIFICANT CHANGE UP (ref 0–6)
EPI CELLS # UR: ABNORMAL
GLUCOSE SERPL-MCNC: 87 MG/DL — SIGNIFICANT CHANGE UP (ref 70–99)
GLUCOSE UR QL: NEGATIVE — SIGNIFICANT CHANGE UP
HCT VFR BLD CALC: 39.4 % — SIGNIFICANT CHANGE UP (ref 34.5–45)
HGB BLD-MCNC: 13 G/DL — SIGNIFICANT CHANGE UP (ref 11.5–15.5)
IMM GRANULOCYTES NFR BLD AUTO: 0.3 % — SIGNIFICANT CHANGE UP (ref 0–1.5)
KETONES UR-MCNC: NEGATIVE — SIGNIFICANT CHANGE UP
LEUKOCYTE ESTERASE UR-ACNC: ABNORMAL
LYMPHOCYTES # BLD AUTO: 3.16 K/UL — SIGNIFICANT CHANGE UP (ref 1–3.3)
LYMPHOCYTES # BLD AUTO: 39.9 % — SIGNIFICANT CHANGE UP (ref 13–44)
MCHC RBC-ENTMCNC: 30.2 PG — SIGNIFICANT CHANGE UP (ref 27–34)
MCHC RBC-ENTMCNC: 33 GM/DL — SIGNIFICANT CHANGE UP (ref 32–36)
MCV RBC AUTO: 91.6 FL — SIGNIFICANT CHANGE UP (ref 80–100)
MONOCYTES # BLD AUTO: 0.67 K/UL — SIGNIFICANT CHANGE UP (ref 0–0.9)
MONOCYTES NFR BLD AUTO: 8.5 % — SIGNIFICANT CHANGE UP (ref 2–14)
NEUTROPHILS # BLD AUTO: 3.92 K/UL — SIGNIFICANT CHANGE UP (ref 1.8–7.4)
NEUTROPHILS NFR BLD AUTO: 49.5 % — SIGNIFICANT CHANGE UP (ref 43–77)
NITRITE UR-MCNC: NEGATIVE — SIGNIFICANT CHANGE UP
NRBC # BLD: 0 /100 WBCS — SIGNIFICANT CHANGE UP (ref 0–0)
PH UR: 6.5 — SIGNIFICANT CHANGE UP (ref 5–8)
PLATELET # BLD AUTO: 315 K/UL — SIGNIFICANT CHANGE UP (ref 150–400)
POTASSIUM SERPL-MCNC: 4.4 MMOL/L — SIGNIFICANT CHANGE UP (ref 3.5–5.3)
POTASSIUM SERPL-SCNC: 4.4 MMOL/L — SIGNIFICANT CHANGE UP (ref 3.5–5.3)
PROT SERPL-MCNC: 7.1 G/DL — SIGNIFICANT CHANGE UP (ref 6–8.3)
PROT UR-MCNC: NEGATIVE — SIGNIFICANT CHANGE UP
RBC # BLD: 4.3 M/UL — SIGNIFICANT CHANGE UP (ref 3.8–5.2)
RBC # FLD: 13.2 % — SIGNIFICANT CHANGE UP (ref 10.3–14.5)
RBC CASTS # UR COMP ASSIST: SIGNIFICANT CHANGE UP /HPF (ref 0–4)
SODIUM SERPL-SCNC: 141 MMOL/L — SIGNIFICANT CHANGE UP (ref 135–145)
SP GR SPEC: 1.01 — SIGNIFICANT CHANGE UP (ref 1.01–1.02)
UROBILINOGEN FLD QL: NEGATIVE — SIGNIFICANT CHANGE UP
WBC # BLD: 7.91 K/UL — SIGNIFICANT CHANGE UP (ref 3.8–10.5)
WBC # FLD AUTO: 7.91 K/UL — SIGNIFICANT CHANGE UP (ref 3.8–10.5)
WBC UR QL: SIGNIFICANT CHANGE UP /HPF (ref 0–5)

## 2022-02-22 PROCEDURE — 81001 URINALYSIS AUTO W/SCOPE: CPT

## 2022-02-22 PROCEDURE — 96374 THER/PROPH/DIAG INJ IV PUSH: CPT

## 2022-02-22 PROCEDURE — 36415 COLL VENOUS BLD VENIPUNCTURE: CPT

## 2022-02-22 PROCEDURE — 99284 EMERGENCY DEPT VISIT MOD MDM: CPT

## 2022-02-22 PROCEDURE — 96361 HYDRATE IV INFUSION ADD-ON: CPT

## 2022-02-22 PROCEDURE — 80053 COMPREHEN METABOLIC PANEL: CPT

## 2022-02-22 PROCEDURE — 87086 URINE CULTURE/COLONY COUNT: CPT

## 2022-02-22 PROCEDURE — 99284 EMERGENCY DEPT VISIT MOD MDM: CPT | Mod: 25

## 2022-02-22 PROCEDURE — 85025 COMPLETE CBC W/AUTO DIFF WBC: CPT

## 2022-02-22 RX ORDER — CEPHALEXIN 500 MG
500 CAPSULE ORAL ONCE
Refills: 0 | Status: COMPLETED | OUTPATIENT
Start: 2022-02-22 | End: 2022-02-22

## 2022-02-22 RX ORDER — SODIUM CHLORIDE 9 MG/ML
1000 INJECTION INTRAMUSCULAR; INTRAVENOUS; SUBCUTANEOUS ONCE
Refills: 0 | Status: COMPLETED | OUTPATIENT
Start: 2022-02-22 | End: 2022-02-22

## 2022-02-22 RX ORDER — ONDANSETRON 8 MG/1
4 TABLET, FILM COATED ORAL ONCE
Refills: 0 | Status: COMPLETED | OUTPATIENT
Start: 2022-02-22 | End: 2022-02-22

## 2022-02-22 RX ORDER — IBUPROFEN 200 MG
600 TABLET ORAL ONCE
Refills: 0 | Status: COMPLETED | OUTPATIENT
Start: 2022-02-22 | End: 2022-02-22

## 2022-02-22 RX ORDER — PHENAZOPYRIDINE HCL 100 MG
1 TABLET ORAL
Qty: 6 | Refills: 0
Start: 2022-02-22 | End: 2022-02-23

## 2022-02-22 RX ORDER — CEPHALEXIN 500 MG
500 CAPSULE ORAL EVERY 12 HOURS
Refills: 0 | Status: DISCONTINUED | OUTPATIENT
Start: 2022-02-22 | End: 2022-02-22

## 2022-02-22 RX ORDER — CEPHALEXIN 500 MG
1 CAPSULE ORAL
Qty: 14 | Refills: 0
Start: 2022-02-22 | End: 2022-02-28

## 2022-02-22 RX ADMIN — Medication 500 MILLIGRAM(S): at 19:23

## 2022-02-22 RX ADMIN — SODIUM CHLORIDE 1000 MILLILITER(S): 9 INJECTION INTRAMUSCULAR; INTRAVENOUS; SUBCUTANEOUS at 20:18

## 2022-02-22 RX ADMIN — SODIUM CHLORIDE 1000 MILLILITER(S): 9 INJECTION INTRAMUSCULAR; INTRAVENOUS; SUBCUTANEOUS at 19:18

## 2022-02-22 RX ADMIN — Medication 600 MILLIGRAM(S): at 18:46

## 2022-02-22 RX ADMIN — ONDANSETRON 4 MILLIGRAM(S): 8 TABLET, FILM COATED ORAL at 19:23

## 2022-02-22 NOTE — ED PROVIDER NOTE - ATTENDING CONTRIBUTION TO CARE
pt c/o 2 days of dysuria, frequency, urgency with lower abd pain. no fever/chills. no n/v/d. c/o feeling "dehydrated" and requesting iv fluids.    exam:   General: well appearing, NAD.   HEENT: eyes perrl,   cor: RRR, s1s2, 2+rad pulses.   lungs: ctabl, no resp distress.   abd: soft, ntnd.   : nontender bladder. no cvat  neuro: a&ox3, cn2-12 intact, MELARA, 5/5 strength c nl sensation all extremities, nl coordination.   MSK: no extremity swelling.  Skin: normal, no rash    AP: pt c sxs c/w UTI.  no s/s of sepsis. well appearing. benign exam.  will check ua/ucx r/o uti. will check lytes, give iv fluids

## 2022-02-22 NOTE — ED PROVIDER NOTE - OBJECTIVE STATEMENT
35 y/o F, UCG neg, with PMH of celiac disease presents to the ED with c/o dysuria, urinary freq and urgency x yesterday, lower abd pain with nausea x today. Pt denies vaginal dc, v/d, f/c, hematuria or all other complaints

## 2022-02-22 NOTE — ED PROVIDER NOTE - CLINICAL SUMMARY MEDICAL DECISION MAKING FREE TEXT BOX
35 y/o F, UCG neg, with PMH of celiac disease presents to the ED with c/o dysuria, urinary freq and urgency x yesterday, lower abd pain with nausea x today. Pt denies vaginal dc, v/d, f/c, hematuria or all other complaints. PE as noted above. pt is well appearing. She is requesting IV hydration, states she feels dehydrated. labs pending. UA reviewed-- will tx for UTI given pt is symptomatic and states this feels like her prior UTIs 37 y/o F, UCG neg, with PMH of celiac disease presents to the ED with c/o dysuria, urinary freq and urgency x yesterday, lower abd pain with nausea x today. Pt denies vaginal dc, v/d, f/c, hematuria or all other complaints. PE as noted above. pt is well appearing. She is requesting IV hydration, states she feels dehydrated. labs pending. UA reviewed-- will tx for UTI given pt is symptomatic and states this feels like her prior UTIs    813pm: labs reviewed. will dc

## 2022-02-22 NOTE — ED ADULT NURSE NOTE - OBJECTIVE STATEMENT
pt presents to ED c/o lower abdominal pain/dysuria/urinary frequency worsening over past 2 days. pt states she has hx of UTI in past and this feels the same. denies fevers/hematuria. endorses cloudiness and sediment in urine.

## 2022-02-22 NOTE — ED PROVIDER NOTE - PATIENT PORTAL LINK FT
You can access the FollowMyHealth Patient Portal offered by [facility] by registering at the following website: [email or web address]. By joining [employer]’s FollowMyHealth portal, you will also be able to view your health information using other applications (apps) compatible with our system.

## 2022-02-24 LAB
CULTURE RESULTS: SIGNIFICANT CHANGE UP
SPECIMEN SOURCE: SIGNIFICANT CHANGE UP

## 2022-03-17 ENCOUNTER — EMERGENCY (EMERGENCY)
Facility: HOSPITAL | Age: 37
LOS: 1 days | Discharge: HOME | End: 2022-03-17
Attending: EMERGENCY MEDICINE | Admitting: EMERGENCY MEDICINE
Payer: MEDICAID

## 2022-03-17 VITALS
HEIGHT: 66 IN | OXYGEN SATURATION: 100 % | SYSTOLIC BLOOD PRESSURE: 131 MMHG | RESPIRATION RATE: 21 BRPM | WEIGHT: 115.96 LBS | HEART RATE: 85 BPM | TEMPERATURE: 98 F | DIASTOLIC BLOOD PRESSURE: 93 MMHG

## 2022-03-17 VITALS
TEMPERATURE: 98 F | HEART RATE: 98 BPM | RESPIRATION RATE: 16 BRPM | DIASTOLIC BLOOD PRESSURE: 70 MMHG | SYSTOLIC BLOOD PRESSURE: 100 MMHG | OXYGEN SATURATION: 100 %

## 2022-03-17 VITALS
DIASTOLIC BLOOD PRESSURE: 69 MMHG | RESPIRATION RATE: 18 BRPM | OXYGEN SATURATION: 100 % | WEIGHT: 115.96 LBS | TEMPERATURE: 98 F | SYSTOLIC BLOOD PRESSURE: 117 MMHG | HEIGHT: 66 IN | HEART RATE: 108 BPM

## 2022-03-17 DIAGNOSIS — Z98.891 HISTORY OF UTERINE SCAR FROM PREVIOUS SURGERY: Chronic | ICD-10-CM

## 2022-03-17 LAB
ALBUMIN SERPL ELPH-MCNC: 4.3 G/DL — SIGNIFICANT CHANGE UP (ref 3.3–5)
ALP SERPL-CCNC: 66 U/L — SIGNIFICANT CHANGE UP (ref 40–120)
ALT FLD-CCNC: 24 U/L — SIGNIFICANT CHANGE UP (ref 10–45)
ANION GAP SERPL CALC-SCNC: 12 MMOL/L — SIGNIFICANT CHANGE UP (ref 5–17)
AST SERPL-CCNC: 32 U/L — SIGNIFICANT CHANGE UP (ref 10–40)
BASOPHILS # BLD AUTO: 0.02 K/UL — SIGNIFICANT CHANGE UP (ref 0–0.2)
BASOPHILS NFR BLD AUTO: 0.2 % — SIGNIFICANT CHANGE UP (ref 0–2)
BILIRUB SERPL-MCNC: 0.4 MG/DL — SIGNIFICANT CHANGE UP (ref 0.2–1.2)
BUN SERPL-MCNC: 14 MG/DL — SIGNIFICANT CHANGE UP (ref 7–23)
CALCIUM SERPL-MCNC: 9 MG/DL — SIGNIFICANT CHANGE UP (ref 8.4–10.5)
CHLORIDE SERPL-SCNC: 106 MMOL/L — SIGNIFICANT CHANGE UP (ref 96–108)
CO2 SERPL-SCNC: 22 MMOL/L — SIGNIFICANT CHANGE UP (ref 22–31)
CREAT SERPL-MCNC: 0.75 MG/DL — SIGNIFICANT CHANGE UP (ref 0.5–1.3)
EGFR: 106 ML/MIN/1.73M2 — SIGNIFICANT CHANGE UP
EOSINOPHIL # BLD AUTO: 0.08 K/UL — SIGNIFICANT CHANGE UP (ref 0–0.5)
EOSINOPHIL NFR BLD AUTO: 0.7 % — SIGNIFICANT CHANGE UP (ref 0–6)
GLUCOSE SERPL-MCNC: 104 MG/DL — HIGH (ref 70–99)
HCT VFR BLD CALC: 46.4 % — HIGH (ref 34.5–45)
HGB BLD-MCNC: 15.2 G/DL — SIGNIFICANT CHANGE UP (ref 11.5–15.5)
IMM GRANULOCYTES NFR BLD AUTO: 0.4 % — SIGNIFICANT CHANGE UP (ref 0–1.5)
LIDOCAIN IGE QN: 190 U/L — SIGNIFICANT CHANGE UP (ref 73–393)
LYMPHOCYTES # BLD AUTO: 1.46 K/UL — SIGNIFICANT CHANGE UP (ref 1–3.3)
LYMPHOCYTES # BLD AUTO: 11.9 % — LOW (ref 13–44)
MCHC RBC-ENTMCNC: 30.1 PG — SIGNIFICANT CHANGE UP (ref 27–34)
MCHC RBC-ENTMCNC: 32.8 GM/DL — SIGNIFICANT CHANGE UP (ref 32–36)
MCV RBC AUTO: 91.9 FL — SIGNIFICANT CHANGE UP (ref 80–100)
MONOCYTES # BLD AUTO: 0.62 K/UL — SIGNIFICANT CHANGE UP (ref 0–0.9)
MONOCYTES NFR BLD AUTO: 5 % — SIGNIFICANT CHANGE UP (ref 2–14)
NEUTROPHILS # BLD AUTO: 10.05 K/UL — HIGH (ref 1.8–7.4)
NEUTROPHILS NFR BLD AUTO: 81.8 % — HIGH (ref 43–77)
NRBC # BLD: 0 /100 WBCS — SIGNIFICANT CHANGE UP (ref 0–0)
PLATELET # BLD AUTO: 379 K/UL — SIGNIFICANT CHANGE UP (ref 150–400)
POTASSIUM SERPL-MCNC: 4.6 MMOL/L — SIGNIFICANT CHANGE UP (ref 3.5–5.3)
POTASSIUM SERPL-SCNC: 4.6 MMOL/L — SIGNIFICANT CHANGE UP (ref 3.5–5.3)
PROT SERPL-MCNC: 8.2 G/DL — SIGNIFICANT CHANGE UP (ref 6–8.3)
RBC # BLD: 5.05 M/UL — SIGNIFICANT CHANGE UP (ref 3.8–5.2)
RBC # FLD: 12.8 % — SIGNIFICANT CHANGE UP (ref 10.3–14.5)
SODIUM SERPL-SCNC: 140 MMOL/L — SIGNIFICANT CHANGE UP (ref 135–145)
WBC # BLD: 12.28 K/UL — HIGH (ref 3.8–10.5)
WBC # FLD AUTO: 12.28 K/UL — HIGH (ref 3.8–10.5)

## 2022-03-17 PROCEDURE — 36415 COLL VENOUS BLD VENIPUNCTURE: CPT

## 2022-03-17 PROCEDURE — 96375 TX/PRO/DX INJ NEW DRUG ADDON: CPT

## 2022-03-17 PROCEDURE — 99284 EMERGENCY DEPT VISIT MOD MDM: CPT

## 2022-03-17 PROCEDURE — 80053 COMPREHEN METABOLIC PANEL: CPT

## 2022-03-17 PROCEDURE — 96374 THER/PROPH/DIAG INJ IV PUSH: CPT

## 2022-03-17 PROCEDURE — 83690 ASSAY OF LIPASE: CPT

## 2022-03-17 PROCEDURE — 85025 COMPLETE CBC W/AUTO DIFF WBC: CPT

## 2022-03-17 PROCEDURE — 96361 HYDRATE IV INFUSION ADD-ON: CPT

## 2022-03-17 PROCEDURE — 96376 TX/PRO/DX INJ SAME DRUG ADON: CPT

## 2022-03-17 PROCEDURE — 99284 EMERGENCY DEPT VISIT MOD MDM: CPT | Mod: 25

## 2022-03-17 PROCEDURE — 99285 EMERGENCY DEPT VISIT HI MDM: CPT

## 2022-03-17 PROCEDURE — 96372 THER/PROPH/DIAG INJ SC/IM: CPT | Mod: XU

## 2022-03-17 PROCEDURE — 96365 THER/PROPH/DIAG IV INF INIT: CPT

## 2022-03-17 RX ORDER — SODIUM CHLORIDE 9 MG/ML
1000 INJECTION INTRAMUSCULAR; INTRAVENOUS; SUBCUTANEOUS ONCE
Refills: 0 | Status: COMPLETED | OUTPATIENT
Start: 2022-03-17 | End: 2022-03-17

## 2022-03-17 RX ORDER — FAMOTIDINE 10 MG/ML
20 INJECTION INTRAVENOUS ONCE
Refills: 0 | Status: COMPLETED | OUTPATIENT
Start: 2022-03-17 | End: 2022-03-17

## 2022-03-17 RX ORDER — ONDANSETRON 8 MG/1
4 TABLET, FILM COATED ORAL ONCE
Refills: 0 | Status: COMPLETED | OUTPATIENT
Start: 2022-03-17 | End: 2022-03-17

## 2022-03-17 RX ORDER — SODIUM CHLORIDE 9 MG/ML
2000 INJECTION INTRAMUSCULAR; INTRAVENOUS; SUBCUTANEOUS ONCE
Refills: 0 | Status: COMPLETED | OUTPATIENT
Start: 2022-03-17 | End: 2022-03-17

## 2022-03-17 RX ORDER — ONDANSETRON 8 MG/1
1 TABLET, FILM COATED ORAL
Qty: 9 | Refills: 0
Start: 2022-03-17 | End: 2022-03-19

## 2022-03-17 RX ORDER — HALOPERIDOL DECANOATE 100 MG/ML
5 INJECTION INTRAMUSCULAR ONCE
Refills: 0 | Status: COMPLETED | OUTPATIENT
Start: 2022-03-17 | End: 2022-03-17

## 2022-03-17 RX ADMIN — FAMOTIDINE 20 MILLIGRAM(S): 10 INJECTION INTRAVENOUS at 08:10

## 2022-03-17 RX ADMIN — SODIUM CHLORIDE 2000 MILLILITER(S): 9 INJECTION INTRAMUSCULAR; INTRAVENOUS; SUBCUTANEOUS at 07:36

## 2022-03-17 RX ADMIN — SODIUM CHLORIDE 1000 MILLILITER(S): 9 INJECTION INTRAMUSCULAR; INTRAVENOUS; SUBCUTANEOUS at 14:18

## 2022-03-17 RX ADMIN — Medication 1 MILLIGRAM(S): at 14:19

## 2022-03-17 RX ADMIN — ONDANSETRON 4 MILLIGRAM(S): 8 TABLET, FILM COATED ORAL at 08:11

## 2022-03-17 RX ADMIN — ONDANSETRON 4 MILLIGRAM(S): 8 TABLET, FILM COATED ORAL at 07:35

## 2022-03-17 RX ADMIN — ONDANSETRON 4 MILLIGRAM(S): 8 TABLET, FILM COATED ORAL at 14:18

## 2022-03-17 RX ADMIN — HALOPERIDOL DECANOATE 5 MILLIGRAM(S): 100 INJECTION INTRAMUSCULAR at 13:55

## 2022-03-17 RX ADMIN — Medication 0.5 MILLIGRAM(S): at 07:36

## 2022-03-17 RX ADMIN — SODIUM CHLORIDE 2000 MILLILITER(S): 9 INJECTION INTRAMUSCULAR; INTRAVENOUS; SUBCUTANEOUS at 10:45

## 2022-03-17 RX ADMIN — Medication 0.5 MILLIGRAM(S): at 08:11

## 2022-03-17 RX ADMIN — FAMOTIDINE 100 MILLIGRAM(S): 10 INJECTION INTRAVENOUS at 07:40

## 2022-03-17 NOTE — ED PROVIDER NOTE - NSFOLLOWUPINSTRUCTIONS_ED_ALL_ED_FT
Cyclic Vomiting Syndrome    WHAT YOU NEED TO KNOW:    Cyclic vomiting syndrome is a condition that causes you to vomit many times in a row for no known reason. It is important to prevent dehydration and other serious complications from repeated vomiting. Work with your healthcare provider to manage or prevent episodes.    DISCHARGE INSTRUCTIONS:    Seek care immediately if:   •You see blood in your vomit or your bowel movements.      •You have sudden, severe pain in your chest and upper abdomen after hard vomiting or retching.      •You have swelling in your neck and chest.       •You are dizzy, cold, and thirsty, and your eyes and mouth are dry.      •You are urinating very little or not at all.      •You have muscle weakness, leg cramps, and trouble breathing.      •Your heart is beating much faster than normal.      •You continue to vomit for more than 48 hours.      Contact your healthcare provider if:   •You have frequent dry heaves (vomiting but nothing comes out).      •You have questions or concerns about your condition or care.      Medicines: You may need any of the following:   •Migraine medicine may be used to prevent or stop migraine headaches. This may be given if you have migraines or are at risk because of a family history of migraines. You may need to take this medicine to prevent migraines or to stop a migraine that has started.      •Antinausea medicine may be needed to control your nausea.      •Medicine may be given to control the amount of acid your stomach makes.      •Take your medicine as directed. Contact your healthcare provider if you think your medicine is not helping or if you have side effects. Tell him or her if you are allergic to any medicine. Keep a list of the medicines, vitamins, and herbs you take. Include the amounts, and when and why you take them. Bring the list or the pill bottles to follow-up visits. Carry your medicine list with you in case of an emergency.      Prevent or manage episodes:   •Avoid triggers. Certain foods can trigger episodes, such as chocolate, cheese, and monosodium glutamate (MSG). Caffeine can also trigger an episode. Your healthcare provider or dietitian can help you identify foods that trigger an episode. This will help you create meal plans to avoid those triggers. Other triggers include too much exercise, motion sickness, or being in hot weather too long.      •Drink more liquids as directed. Vomiting can lead to dehydration. It is important to drink more liquids to help replace lost body fluids. Ask your healthcare provider how much liquid to drink each day and which liquids are best for you. Your provider may recommend that you drink an oral rehydration solution (ORS). ORS contains water, salts, and sugar that are needed to replace the lost body fluids. Ask what kind of ORS to use, how much to drink, and where to get it.      •Eat smaller meals, more often. Eat small amounts of food every 2 to 3 hours, even if you are not hungry. Food in your stomach may decrease your nausea.      •Control stress. Stress or anxiety can trigger an episode. Find ways to relax and manage your stress. Get more rest and sleep.       •Do not drink alcohol. Alcohol may upset or irritate your stomach. Too much alcohol can also cause nausea and vomiting.      •Do not use marijuana (cannabis). Repeated use of marijuana over a long period of time (chronic use) can cause episodes. This is called cannabis hyperemesis syndrome. If you have an episode caused by marijuana use, a hot shower may relieve your symptoms. Ask your healthcare provider for information if want to quit using marijuana and need help quitting.      Follow up with your doctor as directed: Write down your questions so you remember to ask them during your visits.

## 2022-03-17 NOTE — ED ADULT NURSE NOTE - OBJECTIVE STATEMENT
presents to ER with c/o vomiting. pt states " Do I have to explain myself 4 times." Warm blanket provided. Awaiting MD chou.

## 2022-03-17 NOTE — ED PROVIDER NOTE - CLINICAL SUMMARY MEDICAL DECISION MAKING FREE TEXT BOX
37 y/o F with h/o frequent ED visits for cyclic vomiting syndrome seen here earlier today for NBNB emesis returns for continued episodes of vomiting at home. Given Ativan 0.5mg IV x 2 doses, Zofran 4mg IV x 2 doses, Pepcid 20mg IV x1 and 2 liters fluids. Denies fever, chills, acute ab pain, diarrhea, weakness. Denies smoking marijuana. Took no medications at home. Patient noted to "vomit" in room but nothing expelled into vomit bag.   *Intolerance to Reglan  Plan: 35 y/o F with h/o frequent ED visits for cyclic vomiting syndrome seen here earlier today for NBNB emesis returns for continued episodes of vomiting at home. Given Ativan 0.5mg IV x 2 doses, Zofran 4mg IV x 2 doses, Pepcid 20mg IV x1 and 2 liters fluids. Denies fever, chills, acute ab pain, diarrhea, weakness. Denies smoking marijuana. Took no medications at home. Patient noted to "vomit" in room but nothing expelled into vomit bag.   *Intolerance to Reglan  Plan: Anxiolysis, antiemetic, fluids. Allow rest.   Reassess

## 2022-03-17 NOTE — ED PROVIDER NOTE - OBJECTIVE STATEMENT
35 y/o F with h/o frequent ED visits for cyclic vomiting syndrome seen here earlier today for NBNB emesis returns for continued episodes of vomiting at home. Given Ativan 0.5mg IV x 2 doses, Zofran 4mg IV x 2 doses, Pepcid 20mg IV x1 and 2 liters fluids. Denies fever, chills, acute ab pain, diarrhea, weakness. Denies smoking marijuana. Took no medications at home. Patient noted to "vomit" in room but nothing expelled into vomit bag.   *Intolerance to Reglan

## 2022-03-17 NOTE — ED ADULT NURSE REASSESSMENT NOTE - NS ED NURSE REASSESS COMMENT FT1
Pt received from MAYRA Abreu at this time.  Pt is AAOX4, speaking in full clear sentences.  Noted to be rocking back and forth, endorsing abdominal discomfort and nausea with dry heaving.  Pt provided with warm blankets, assisted with position of comfort.  Provided call bell.  Pending MD evaluation.  Safety maintained.  Will continue to monitor.

## 2022-03-17 NOTE — ED PROVIDER NOTE - PATIENT PORTAL LINK FT
You can access the FollowMyHealth Patient Portal offered by [facility] by registering at the following website: [email or web address]. By joining [employer]’s FollowMyHealth portal, you will also be able to view your health information using other applications (apps) compatible with our system.

## 2022-03-17 NOTE — ED PROVIDER NOTE - ATTENDING CONTRIBUTION TO CARE
Andrei with BLANCHE Asher. 35 y/o F with h/o frequent ED visits for cyclic vomiting syndrome seen here earlier today for NBNB emesis returns for continued episodes of vomiting at home. Given Ativan 0.5mg IV x 2 doses, Zofran 4mg IV x 2 doses, Pepcid 20mg IV x1 and 2 liters fluids. Denies fever, chills, acute ab pain, diarrhea, weakness. Denies smoking marijuana. Took no medications at home. Patient noted to "vomit" in room but nothing expelled into vomit bag.   *Intolerance to Reglan  Plan: Anxiolysis, antiemetic, fluids. Allow rest.   Reassess     I performed a face to face bedside interview with patient regarding history of present illness, review of symptoms and past medical history. I completed an independent physical exam.  I have discussed the patient's plan of care with Physician Assistant (PA). I agree with note as stated above, having amended the EMR as needed to reflect my findings.   This includes History of Present Illness, HIV, Past Medical/Surgical/Family/Social History, Allergies and Home Medications, Review of Systems, Physical Exam, and any Progress Notes during the time I functioned as the attending physician for this patient.

## 2022-03-17 NOTE — ED PROVIDER NOTE - OBJECTIVE STATEMENT
36F presents to the ED c/o abdominal pain and vomiting with nausea. Pt has been to the ED several times in the past for the same. Consideration for cyclical vomiting syndrome. Pt denies fever or diarrhea. Pain is epigastric. No dysuria. No hematuria. No back pain. It started last night and she believes it was the fish that she ate.

## 2022-03-17 NOTE — ED PROVIDER NOTE - NS ED ATTENDING STATEMENT MOD
This was a shared visit with the [facility]. I reviewed and verified the documentation and independently performed the documented:

## 2022-03-17 NOTE — ED PROVIDER NOTE - CARE PROVIDER_API CALL
Celestine Alejandra (MD)  Gastroenterology; Internal Medicine  [address] [place] [address]  Phone: [phone]  Fax: [phone]  Follow Up Time:

## 2022-03-17 NOTE — ED PROVIDER NOTE - PATIENT PORTAL LINK FT
You can access the FollowMyHealth Patient Portal offered by [facility] by registering at the following website: [email or web address]. By joining [employer]’s FollowMyHealth portal, you will also be able to view your health information using other applications (apps) compatible with our system.

## 2022-03-17 NOTE — ED PROVIDER NOTE - CLINICAL SUMMARY MEDICAL DECISION MAKING FREE TEXT BOX
36F presents to the ED c/o abdominal pain and vomiting with nausea. Pt has been to the ED several times in the past for the same. Consideration for cyclical vomiting syndrome. Pt denies fever or diarrhea. Pain is epigastric. No dysuria. No hematuria. No back pain. It started last night and she believes it was the fish that she ate.   Given meds for symptoms and observed. Pt with improvement after 2nd dose zofran/lorazepam. PO challenge. 36F presents to the ED c/o abdominal pain and vomiting with nausea. Pt has been to the ED several times in the past for the same. Consideration for cyclical vomiting syndrome. Pt denies fever or diarrhea. Pain is epigastric. No dysuria. No hematuria. No back pain. It started last night and she believes it was the fish that she ate.   Given meds for symptoms and observed. Pt with improvement after 2nd dose zofran/lorazepam. PO challenge. Tolerated. Stable for dc.

## 2022-03-18 ENCOUNTER — EMERGENCY (EMERGENCY)
Facility: HOSPITAL | Age: 37
LOS: 1 days | Discharge: HOME | End: 2022-03-18
Attending: EMERGENCY MEDICINE | Admitting: EMERGENCY MEDICINE
Payer: MEDICAID

## 2022-03-18 VITALS
HEART RATE: 107 BPM | HEIGHT: 66 IN | WEIGHT: 115.96 LBS | TEMPERATURE: 98 F | DIASTOLIC BLOOD PRESSURE: 79 MMHG | RESPIRATION RATE: 18 BRPM | SYSTOLIC BLOOD PRESSURE: 127 MMHG | OXYGEN SATURATION: 100 %

## 2022-03-18 DIAGNOSIS — Z98.891 HISTORY OF UTERINE SCAR FROM PREVIOUS SURGERY: Chronic | ICD-10-CM

## 2022-03-18 PROCEDURE — 99283 EMERGENCY DEPT VISIT LOW MDM: CPT

## 2022-03-18 RX ORDER — DIPHENHYDRAMINE HCL 50 MG
50 CAPSULE ORAL ONCE
Refills: 0 | Status: COMPLETED | OUTPATIENT
Start: 2022-03-18 | End: 2022-03-18

## 2022-03-18 RX ADMIN — Medication 50 MILLIGRAM(S): at 21:02

## 2022-03-18 RX ADMIN — Medication 1 MILLIGRAM(S): at 21:02

## 2022-03-18 NOTE — ED PROVIDER NOTE - NSFOLLOWUPINSTRUCTIONS_ED_ALL_ED_FT
Follow Up in 1-3 Days with your own doctor or with  [facility]  Family Medicine  [address] [place] [id number]  Phone: [phone]    - return for chest pain, difficult breathing , feeling faint, worse symptoms or other concerns

## 2022-03-18 NOTE — ED PROVIDER NOTE - CLINICAL SUMMARY MEDICAL DECISION MAKING FREE TEXT BOX
pt c cyclic vomiting given haldol yesterday in ED for vomiting, today vomiting resolved but c/o restlessness all day. vitals ok. exam unremarkable. ?akathisia from haldol?, although last dose 24hr ago.  will give ativan, benadryl for sxs. f/u pmd

## 2022-03-18 NOTE — ED ADULT NURSE NOTE - OBJECTIVE STATEMENT
Patient complaint of taking a prescription Haldol and making her feel agitated and restless, stating that she was jogging around the park.

## 2022-03-18 NOTE — ED ADULT TRIAGE NOTE - CHIEF COMPLAINT QUOTE
Pt here twice yesterday for cyclical vomiting.  Pt states she had "bad reaction to Haldol".  Pt states she feels anxious, shaky.

## 2022-03-18 NOTE — ED PROVIDER NOTE - OBJECTIVE STATEMENT
pt c h/o cyclic vomiting c/o feeling restless, jittery, all day, cant stay still, felt need to run/move.  think it is related to haldol she got yesterday in ED here. pt was here yesterday for vomiting.  no more vomiting today. no nausea. no abd pain. no fever. no chest pain/sob.

## 2022-03-18 NOTE — ED PROVIDER NOTE - PATIENT PORTAL LINK FT
You can access the FollowMyHealth Patient Portal offered by [facility] by registering at the following website: [email or web address]. By joining [employer]’s FollowMyHealth portal, you will also be able to view your health information using other applications (apps) compatible with our system.

## 2022-04-20 NOTE — ED PROVIDER NOTE - CONSTITUTIONAL, MLM
HPI     New patient to [facility]   Last eye exam was 2020    Diabetic eye exam  Diagnosed with diabetes in 2012  Recent vision fluctuations Yes  PCP Dr. Lipscomb  Last A1C 6.2  Vision changes since last eye exam?: Yes   Wears OTC readers +1.75    Any eye pain today: No    Other ocular symptoms: Itchy eyes and irritation    Interested in contact lens fitting today? No       Last edited by Yvette Gonzales, PCT on 4/20/2022  3:55 PM. (History)              Assessment /Plan     For exam results, see Encounter Report.    Diabetic eye exam  -     Ambulatory referral/consult to Optometry    Diabetes mellitus without complication  2 years, last A1c 6.2 Stressed importance of DM control to preserve vision. No diabetic retinopathy was seen in either eye today. Continue strict blood glucose control.  Reviewed importance of yearly dilated eye exams. Continue close care with PCP regarding diabetes.    Diabetic cataract  Cataracts not significantly affecting activities of daily living and therefore surgery is not indicated at this time.   Will continue to monitor over the next 12 months. Pt to call or RTC with any significant change in vision prior to next visit.     Presbyopia of both eyes  OTC readers +1.75DS OK   Eyeglass Final Rx     Eyeglass Final Rx       Sphere Cylinder Add    Right +0.50 DS +2.50    Left +1.00 DS +2.50    Type: PAL    Expiration Date: 4/20/2023              RTC 1 yr for dilated eye exam or sooner if any changes to vision.   Discussed above and answered questions.                    normal... Well appearing, well nourished, awake, alert, oriented to person, place, time/situation and in no apparent distress.

## 2022-05-29 ENCOUNTER — EMERGENCY (EMERGENCY)
Facility: HOSPITAL | Age: 37
LOS: 1 days | Discharge: HOME | End: 2022-05-29
Attending: EMERGENCY MEDICINE | Admitting: EMERGENCY MEDICINE
Payer: MEDICAID

## 2022-05-29 VITALS
DIASTOLIC BLOOD PRESSURE: 62 MMHG | SYSTOLIC BLOOD PRESSURE: 100 MMHG | OXYGEN SATURATION: 100 % | RESPIRATION RATE: 18 BRPM | HEART RATE: 93 BPM | TEMPERATURE: 98 F

## 2022-05-29 VITALS
TEMPERATURE: 97 F | HEIGHT: 66 IN | HEART RATE: 72 BPM | WEIGHT: 118.61 LBS | SYSTOLIC BLOOD PRESSURE: 110 MMHG | OXYGEN SATURATION: 100 % | DIASTOLIC BLOOD PRESSURE: 79 MMHG | RESPIRATION RATE: 18 BRPM

## 2022-05-29 DIAGNOSIS — Z98.891 HISTORY OF UTERINE SCAR FROM PREVIOUS SURGERY: Chronic | ICD-10-CM

## 2022-05-29 LAB
ANION GAP SERPL CALC-SCNC: 6 MMOL/L — SIGNIFICANT CHANGE UP (ref 5–17)
BASOPHILS # BLD AUTO: 0.04 K/UL — SIGNIFICANT CHANGE UP (ref 0–0.2)
BASOPHILS NFR BLD AUTO: 0.5 % — SIGNIFICANT CHANGE UP (ref 0–2)
BUN SERPL-MCNC: 18 MG/DL — SIGNIFICANT CHANGE UP (ref 7–23)
CALCIUM SERPL-MCNC: 8.9 MG/DL — SIGNIFICANT CHANGE UP (ref 8.4–10.5)
CHLORIDE SERPL-SCNC: 107 MMOL/L — SIGNIFICANT CHANGE UP (ref 96–108)
CO2 SERPL-SCNC: 27 MMOL/L — SIGNIFICANT CHANGE UP (ref 22–31)
CREAT SERPL-MCNC: 0.65 MG/DL — SIGNIFICANT CHANGE UP (ref 0.5–1.3)
EGFR: 117 ML/MIN/1.73M2 — SIGNIFICANT CHANGE UP
EOSINOPHIL # BLD AUTO: 0.12 K/UL — SIGNIFICANT CHANGE UP (ref 0–0.5)
EOSINOPHIL NFR BLD AUTO: 1.6 % — SIGNIFICANT CHANGE UP (ref 0–6)
GLUCOSE SERPL-MCNC: 92 MG/DL — SIGNIFICANT CHANGE UP (ref 70–99)
HCT VFR BLD CALC: 39.4 % — SIGNIFICANT CHANGE UP (ref 34.5–45)
HGB BLD-MCNC: 12.7 G/DL — SIGNIFICANT CHANGE UP (ref 11.5–15.5)
IMM GRANULOCYTES NFR BLD AUTO: 0.1 % — SIGNIFICANT CHANGE UP (ref 0–1.5)
LYMPHOCYTES # BLD AUTO: 2.89 K/UL — SIGNIFICANT CHANGE UP (ref 1–3.3)
LYMPHOCYTES # BLD AUTO: 39.7 % — SIGNIFICANT CHANGE UP (ref 13–44)
MCHC RBC-ENTMCNC: 29.9 PG — SIGNIFICANT CHANGE UP (ref 27–34)
MCHC RBC-ENTMCNC: 32.2 GM/DL — SIGNIFICANT CHANGE UP (ref 32–36)
MCV RBC AUTO: 92.7 FL — SIGNIFICANT CHANGE UP (ref 80–100)
MONOCYTES # BLD AUTO: 0.68 K/UL — SIGNIFICANT CHANGE UP (ref 0–0.9)
MONOCYTES NFR BLD AUTO: 9.3 % — SIGNIFICANT CHANGE UP (ref 2–14)
NEUTROPHILS # BLD AUTO: 3.54 K/UL — SIGNIFICANT CHANGE UP (ref 1.8–7.4)
NEUTROPHILS NFR BLD AUTO: 48.8 % — SIGNIFICANT CHANGE UP (ref 43–77)
NRBC # BLD: 0 /100 WBCS — SIGNIFICANT CHANGE UP (ref 0–0)
PLATELET # BLD AUTO: 349 K/UL — SIGNIFICANT CHANGE UP (ref 150–400)
POTASSIUM SERPL-MCNC: 4.6 MMOL/L — SIGNIFICANT CHANGE UP (ref 3.5–5.3)
POTASSIUM SERPL-SCNC: 4.6 MMOL/L — SIGNIFICANT CHANGE UP (ref 3.5–5.3)
RBC # BLD: 4.25 M/UL — SIGNIFICANT CHANGE UP (ref 3.8–5.2)
RBC # FLD: 13.6 % — SIGNIFICANT CHANGE UP (ref 10.3–14.5)
SODIUM SERPL-SCNC: 140 MMOL/L — SIGNIFICANT CHANGE UP (ref 135–145)
WBC # BLD: 7.28 K/UL — SIGNIFICANT CHANGE UP (ref 3.8–10.5)
WBC # FLD AUTO: 7.28 K/UL — SIGNIFICANT CHANGE UP (ref 3.8–10.5)

## 2022-05-29 PROCEDURE — 36415 COLL VENOUS BLD VENIPUNCTURE: CPT

## 2022-05-29 PROCEDURE — 85025 COMPLETE CBC W/AUTO DIFF WBC: CPT

## 2022-05-29 PROCEDURE — 96374 THER/PROPH/DIAG INJ IV PUSH: CPT

## 2022-05-29 PROCEDURE — 99284 EMERGENCY DEPT VISIT MOD MDM: CPT

## 2022-05-29 PROCEDURE — 99284 EMERGENCY DEPT VISIT MOD MDM: CPT | Mod: 25

## 2022-05-29 PROCEDURE — 80048 BASIC METABOLIC PNL TOTAL CA: CPT

## 2022-05-29 PROCEDURE — 96375 TX/PRO/DX INJ NEW DRUG ADDON: CPT

## 2022-05-29 RX ORDER — SODIUM CHLORIDE 9 MG/ML
1000 INJECTION INTRAMUSCULAR; INTRAVENOUS; SUBCUTANEOUS ONCE
Refills: 0 | Status: DISCONTINUED | OUTPATIENT
Start: 2022-05-29 | End: 2022-06-02

## 2022-05-29 RX ORDER — ONDANSETRON 8 MG/1
4 TABLET, FILM COATED ORAL ONCE
Refills: 0 | Status: COMPLETED | OUTPATIENT
Start: 2022-05-29 | End: 2022-05-29

## 2022-05-29 RX ADMIN — Medication 2 MILLIGRAM(S): at 23:09

## 2022-05-29 RX ADMIN — ONDANSETRON 4 MILLIGRAM(S): 8 TABLET, FILM COATED ORAL at 23:10

## 2022-05-29 NOTE — ED ADULT NURSE NOTE - OBJECTIVE STATEMENT
Patient A&Ox4 c/o multiple vomiting episodes x 1 day. Patient reports has a hx of CVS and anxiety. Denies abd. pain/diarrhea/fever/Cp/sob.

## 2022-05-29 NOTE — ED PROVIDER NOTE - NSFOLLOWUPINSTRUCTIONS_ED_ALL_ED_FT
Cyclic Vomiting Syndrome, Adult      Cyclic vomiting syndrome (CVS) is a condition that causes episodes of severe nausea and vomiting. It can last for hours or even days. Attacks may occur several times a month or several times a year. Between episodes of CVS, you may be otherwise healthy.      What are the causes?    The cause of this condition is not known. Although many of the episodes can happen for no obvious reason, you may have specific CVS triggers. Episodes may be triggered by:  •An infection, especially colds and the flu.      •Emotional stress, including excitement or anxiety about upcoming events, such as school, parties, or travel.      •Certain foods or beverages, such as chocolate, cheese, alcohol, and food additives.      •Motion sickness.      •Eating a large meal before bed.      •Being very tired.      •Being too hot.        What increases the risk?    You are more likely to develop this condition if:  •You get migraine headaches.      •You have a family history of CVS or migraine headaches.        What are the signs or symptoms?    Symptoms tend to happen at the same time of day, and each episode tends to last about the same amount of time. Symptoms commonly start at night or when you wake up. Many people have warning signs (prodrome) before an episode, which may include slight nausea, sweating, and pale skin (pallor).    The most common symptoms of a CVS attack include:  •Severe vomiting. Vomiting may happen every 5–15 minutes.      •Severe nausea.      •Gagging (retching).      Other symptoms may include:  •Headache.      •Dizziness.      •Sensitivity to light.      •Extreme thirst.      •Abdominal pain. This can be severe.      •Loose stools or diarrhea.      •Fever.      •Pale skin (pallor), especially on the face.      •Weakness.      •Exhaustion.      •Sleepiness after a CVS episode.    •Dehydration. This can cause:  •Thirst.      •Dry mouth.      •Decreased urination.      •Fatigue.          How is this diagnosed?    This condition may be diagnosed based on your symptoms, medical history, and family history of CVS or migraine. Your health care provider will ask whether you have had:  •Episodes of severe nausea and vomiting that have happened a total of 5 or more times, or 3 or more times in the past 6 months.      •Episodes that last for 1 hour or more, and occur 1 week apart or farther apart.      •Episodes that are similar each time.      •Normal health between episodes.      Your health care provider will also do a physical exam. To rule out other conditions, you may have tests, such as:  •Blood tests.      •Urine tests.      •Imaging tests.        How is this treated?  A prescription pill bottle with an example of a pill.   There is no cure for this condition, but treatment can help manage or prevent CVS episodes. Work with your health care provider to find the best treatment for you. Treatment may include:  •Avoiding stress and CVS triggers.      •Eating smaller, more frequent meals.    •Taking medicines, such as:  •Over-the-counter pain medicine.      •Anti-nausea medicines.      •Antacids.      •Antihistamines.      •Medicines for migraines.      •Antidepressants.      •Antibiotics.        Severe nausea and vomiting may require you to stay at the hospital. You may need IV fluids to prevent or treat dehydration.      Follow these instructions at home:    During an episode     •Take over-the-counter and prescription medicines only as told by your health care provider.      •Stay in bed and rest in a dark, quiet room.        After an episode   Illustration of a person drinking a glass of water.   •Drink an oral rehydration solution (ORS), if directed by your health care provider. This is a drink that helps you replace fluids and the salts and minerals in your blood (electrolytes). It can be found at pharmacies and retail stores.    •Drink small amounts of clear fluids slowly and gradually add more.   •Drink clear fluids such as water or fruit juice that has water added (is diluted). You may also eat low-calorie popsicles.      •Avoid drinking fluids that contain a lot of sugar or caffeine, such as sports drinks and soda.        •Eat soft foods in small amounts every 3–4 hours. Eat your regular diet, but avoid spicy or fatty foods, such as french fries and pizza.      General instructions     •Monitor your condition for any changes.      •If you were prescribed an antibiotic medicine, take it as told by your health care provider. Do not stop taking the antibiotic even if you start to feel better.      •Keep track of your attacks and symptoms, and pay attention to any triggers. Avoid those triggers when you can.      •Keep all follow-up visits as told by your health care provider. This is important.        Contact a health care provider if:    •Your condition gets worse.      •You cannot drink fluids without vomiting.      •You have pain and trouble swallowing after an episode.        Get help right away if:    •You have blood in your vomit.      •Your vomit looks like coffee grounds.      •You have stools that are bloody or black, or stools that look like tar.    •You have signs of dehydration, such as:  •Sunken eyes.      •Not making tears while crying.      •Very dry mouth.      •Cracked lips.      •Decreased urine production.      •Dark urine. Urine may be the color of tea.      •Weakness.      •Sleepiness.          Summary    •Cyclic vomiting syndrome (CVS) causes episodes of severe nausea and vomiting that can last for hours or even days.      •Treatment can help you manage or prevent CVS episodes. Work with your health care provider to find the best treatment for you.      •Vomiting and diarrhea can make you feel weak and can lead to dehydration. If you notice signs of dehydration, call your health care provider right away.      •Keep all follow-up visits as told by your health care provider. This is important.      This information is not intended to replace advice given to you by your health care provider. Make sure you discuss any questions you have with your health care provider.      Document Revised: 03/09/2022 Document Reviewed: 11/18/2021    [employer] Patient Education © 2022 [employer]

## 2022-05-29 NOTE — ED PROVIDER NOTE - OBJECTIVE STATEMENT
37 y/o F with h/o CVS presents to ED c/o NBNB  vomiting Multiple time since past few hours , no abdominal pain , no diarrhea,

## 2022-05-29 NOTE — ED PROVIDER NOTE - PATIENT PORTAL LINK FT
You can access the FollowMyHealth Patient Portal offered by [facility] by registering at the following website: [email or web address]. By joining [employer]’s FollowMyHealth portal, you will also be able to view your health information using other applications (apps) compatible with our system.

## 2022-05-29 NOTE — ED PROVIDER NOTE - CLINICAL SUMMARY MEDICAL DECISION MAKING FREE TEXT BOX
pt with h/o CVS p/w vomiting, no sign of dehydration, was given IV fluids and antiemetic and sedative and felt better

## 2022-07-31 ENCOUNTER — EMERGENCY (EMERGENCY)
Facility: HOSPITAL | Age: 37
LOS: 1 days | Discharge: HOME | End: 2022-07-31
Attending: EMERGENCY MEDICINE | Admitting: EMERGENCY MEDICINE
Payer: MEDICAID

## 2022-07-31 VITALS
OXYGEN SATURATION: 100 % | RESPIRATION RATE: 15 BRPM | HEART RATE: 94 BPM | HEIGHT: 66 IN | TEMPERATURE: 98 F | DIASTOLIC BLOOD PRESSURE: 89 MMHG | SYSTOLIC BLOOD PRESSURE: 129 MMHG | WEIGHT: 130.07 LBS

## 2022-07-31 DIAGNOSIS — Z98.891 HISTORY OF UTERINE SCAR FROM PREVIOUS SURGERY: Chronic | ICD-10-CM

## 2022-07-31 PROCEDURE — 99282 EMERGENCY DEPT VISIT SF MDM: CPT

## 2022-07-31 NOTE — ED PROVIDER NOTE - ATTENDING APP SHARED VISIT CONTRIBUTION OF CARE
I, Paula Hogan MD, performed the initial face to face bedside interview with this patient regarding history of present illness, review of symptoms and relevant past medical, social and family history.  I completed an independent physical examination.  I was the initial provider who evaluated this patient. I have signed out the follow up of any pending tests (i.e. labs, radiological studies) to the ACP.  I have communicated the patient’s plan of care and disposition with the ACP.  The history, relevant review of systems, past medical and surgical history, medical decision making, and physical examination was documented by the scribe in my presence and I attest to the accuracy of the documentation.

## 2022-07-31 NOTE — ED PROVIDER NOTE - SKIN, MLM
B/L breast s/p breast augmentation. Absorbable sutures still in place. No erythema/edema/pus/drainage noted. Skin normal color for race, warm, dry and intact. No evidence of rash.

## 2022-07-31 NOTE — ED PROVIDER NOTE - NSFOLLOWUPINSTRUCTIONS_ED_ALL_ED_FT
Care For Your Absorbable Stitches    WHAT YOU NEED TO KNOW:    Absorbable stitches, or sutures, are used to close cuts or wounds. These stitches are absorbed by your body, or fall off on their own within days or weeks. They do not need to be removed.           DISCHARGE INSTRUCTIONS:    Return to the emergency department if:   •Your wound comes apart.       •You have red streaks around your wound.      •You have swollen or painful lymph nodes.      •Blood soaks through your bandage.      Contact your healthcare provider if:   •You have signs of an infection, such as increased redness, pain, swelling, or pus.      •You have a fever.      •Your stitches do not absorb when your healthcare provider says they should.      •You have questions or concerns about your condition or care.      Care for your wound and absorbable stitches as directed:   •Protect the stitches. Wear protective clothing over the stitches, and protect the area from sunlight. Do not place pressure on your wound. This could open your wound and increase your risk for an infection.      •Clean your wound as directed. Carefully wash the wound with soap and water. Gently dry the area and put on new, clean bandages as directed. Change your bandages when they get wet or dirty. Check your wound for signs of infection when you clean it. Signs include redness, swelling, and pus.      •Keep the area dry as directed. Wait 12 to 24 hours after you receive your stitches before you take a shower. Take showers instead of baths. Do not take a bath or swim. Your healthcare provider will give you instructions for bathing with your stitches.      Help your wound heal:   •Elevate your wound. Keep your wound above the level of your heart as often as you can. This will help decrease swelling and pain. Prop the area on pillows or blankets, if possible, to keep it elevated comfortably.      •Limit activity. Do not stretch the skin around your wound. This will help prevent bleeding and swelling.      Follow up with your doctor as directed (along with the general surgeon if needed): Write down your questions so you remember to ask them during your visits.

## 2022-07-31 NOTE — ED PROVIDER NOTE - PHYSICAL EXAMINATION
Constitutional: NAD AAOx3  Eyes: PERRLA EOMI  Head: Normocephalic atraumatic  Mouth: MMM  Cardiac: regular rate   Resp: Lungs CTAB  GI: Abd s/nt/nd, no rebound or guarding.  Neuro: awake, alert, moving all extremities, cranial nerves 2-12 intact, sensation intact, no dysmetria.  Skin: No rashes Constitutional: NAD AAOx3  Eyes: PERRLA EOMI  Head: Normocephalic atraumatic  Mouth: MMM  Cardiac: regular rate   Resp: Lungs CTAB  GI: Abd s/nt/nd, no rebound or guarding.  Neuro: awake, alert, moving all extremities, cranial nerves 2-12 intact, sensation intact, no dysmetria.  Skin: No rashes, no evidence of infection

## 2022-07-31 NOTE — ED ADULT NURSE NOTE - OBJECTIVE STATEMENT
pt presents to ED with c.o left breast pain worsening the last few days. hx of breast augmentation x 5 weeks ago in [place]. Pt states she feels as if there are still sutures present in her breast. Pt has followed up with her PCP and general surgeon who snipped the base of her sutures but did not see the general surgeon remove them. Breast examined with BLANCHE Dennis at bedside. Right breast healing well, left breast healing no signs of infection noted. Bruise noted to bottom of left breast but no active bleeding/discharge. pt denies any fever, SOB, chest pain or N/V/D. pt well appearing.

## 2022-07-31 NOTE — ED PROVIDER NOTE - OBJECTIVE STATEMENT
37 y/o F presents for breast pain s/p breast augmentation 5 weeks ago. 35 y/o F presents for breast pain s/p breast augmentation 5 weeks ago.  Patient was seen by her zak 37 y/o F presents for breast pain s/p breast augmentation 5 weeks ago in [place] and states that her sutures are bothering her. She came in for evaluation. She has a surgeon in [place] to follow up with but was told by her surgeon in [place] that she has to have a suture removed so came to the ed. no fever, chills.

## 2022-07-31 NOTE — ED PROVIDER NOTE - CLINICAL SUMMARY MEDICAL DECISION MAKING FREE TEXT BOX
Young f s/p breast reduction surgery in [place] presents for suture removal. no evidece of infection, advised to f/y with her surgeon or plastic surgeon, referral given Young f s/p breast reduction surgery in [place] presents for suture removal. no evidece of infection, advised to f/y with her surgeon or plastic surgeon, referral given    Patient's irritation of her breast augmentation is due to absorbable sutures still in place, educated client they cannot be removed in ED.

## 2022-07-31 NOTE — ED PROVIDER NOTE - NSFOLLOWUPCLINICS_GEN_ALL_ED_FT
[facility]  General Surgery  [address] [place] [address] - [address] [place] [address]  Phone: [phone]  Fax:

## 2022-07-31 NOTE — ED PROVIDER NOTE - PATIENT PORTAL LINK FT
You can access the FollowMyHealth Patient Portal offered by [facility] by registering at the following website: [email or web address]. By joining [employer]’s FollowMyHealth portal, you will also be able to view your health information using other applications (apps) compatible with our system.

## 2022-08-08 ENCOUNTER — NON-APPOINTMENT (OUTPATIENT)
Age: 37
End: 2022-08-08

## 2022-08-09 ENCOUNTER — APPOINTMENT (OUTPATIENT)
Dept: SURGERY | Facility: CLINIC | Age: 37
End: 2022-08-09

## 2022-08-09 VITALS
TEMPERATURE: 99.3 F | OXYGEN SATURATION: 97 % | BODY MASS INDEX: 21.69 KG/M2 | DIASTOLIC BLOOD PRESSURE: 67 MMHG | HEIGHT: 66 IN | SYSTOLIC BLOOD PRESSURE: 103 MMHG | WEIGHT: 135 LBS | HEART RATE: 101 BPM

## 2022-08-09 DIAGNOSIS — N64.4 MASTODYNIA: ICD-10-CM

## 2022-08-09 PROCEDURE — 99202 OFFICE O/P NEW SF 15 MIN: CPT

## 2022-08-09 NOTE — PHYSICAL EXAM
[Normocephalic] : normocephalic [Atraumatic] : atraumatic [EOMI] : extra ocular movement intact [PERRL] : pupils equal, round and reactive to light [Sclera nonicteric] : sclera nonicteric [Supple] : supple [No Supraclavicular Adenopathy] : no supraclavicular adenopathy [No Cervical Adenopathy] : no cervical adenopathy [Clear to Auscultation Bilat] : clear to auscultation bilaterally [Normal Sinus Rhythm] : normal sinus rhythm [Normal S1, S2] : normal S1 and S2 [Examined in the supine and seated position] : examined in the supine and seated position [Symmetrical] : symmetrical [No dominant masses] : no dominant masses in right breast  [No dominant masses] : no dominant masses left breast [No Nipple Retraction] : no left nipple retraction [No Nipple Discharge] : no left nipple discharge [Breast Mass Right Breast ___cm] : no masses [Breast Mass Left Breast ___cm] : no masses [No Axillary Lymphadenopathy] : no left axillary lymphadenopathy [de-identified] : well developed female in no acute distress [de-identified] : all surgical scars are clean and closed without erythema. Pt with multiple absorbable sutures that came to the surface and that I removed

## 2022-08-09 NOTE — HISTORY OF PRESENT ILLNESS
[FreeTextEntry1] : 36 year old female who is s/p a breast lift in [place] 7 weeks ago who presents c/o of a palpable stitches. She denies any redness or drainage. She has not had any fevers or chills. \par RISK FACTORS\par    FHX NEGATIVE\par    1 ST PERIOD 13\par    1 st child 34\par    without prior breast biopsies

## 2022-08-09 NOTE — HISTORY OF PRESENT ILLNESS
[FreeTextEntry1] : 36 year old female who is s/p a breast lift in [place] 7 weeks ago who presents c/o of a palpable stitches. She denies any redness or drainage. She has not had any fevers or chills. \par RISK FACTORS\par    FHX NEGATIVE\par    1 ST PERIOD 13\par    1 st child 34\par    without prior breast biopsies

## 2022-08-09 NOTE — PAST MEDICAL HISTORY
[Menarche Age ____] : age at menarche was [unfilled] [Definite ___ (Date)] : the last menstrual period was [unfilled] [Regular Cycle Intervals] : have been regular [Total Preg ___] : G[unfilled] [Live Births ___] : P[unfilled]  [Full Term ___] : Full Term: [unfilled] [Premature ___] : Premature: [unfilled] [Age At Live Birth ___] : Age at live birth: [unfilled] [FreeTextEntry5] : N/A [FreeTextEntry6] : N/A [FreeTextEntry7] : N/A [FreeTextEntry8] : breast feed for the first one

## 2022-08-09 NOTE — PHYSICAL EXAM
[Normocephalic] : normocephalic [Atraumatic] : atraumatic [EOMI] : extra ocular movement intact [PERRL] : pupils equal, round and reactive to light [Sclera nonicteric] : sclera nonicteric [Supple] : supple [No Supraclavicular Adenopathy] : no supraclavicular adenopathy [No Cervical Adenopathy] : no cervical adenopathy [Clear to Auscultation Bilat] : clear to auscultation bilaterally [Normal Sinus Rhythm] : normal sinus rhythm [Normal S1, S2] : normal S1 and S2 [Examined in the supine and seated position] : examined in the supine and seated position [Symmetrical] : symmetrical [No dominant masses] : no dominant masses in right breast  [No dominant masses] : no dominant masses left breast [No Nipple Retraction] : no left nipple retraction [No Nipple Discharge] : no left nipple discharge [Breast Mass Right Breast ___cm] : no masses [Breast Mass Left Breast ___cm] : no masses [No Axillary Lymphadenopathy] : no left axillary lymphadenopathy [de-identified] : well developed female in no acute distress [de-identified] : all surgical scars are clean and closed without erythema. Pt with multiple absorbable sutures that came to the surface and that I removed

## 2022-10-31 NOTE — ED ADULT TRIAGE NOTE - STATUS:
Applied Details are unknown, UTOX + for amphetamines. In ED, pt stated she thinks that she "dabbled" with crystal meth this weekend.

## 2022-11-06 ENCOUNTER — EMERGENCY (EMERGENCY)
Facility: HOSPITAL | Age: 37
LOS: 1 days | Discharge: HOME | End: 2022-11-06
Attending: EMERGENCY MEDICINE | Admitting: EMERGENCY MEDICINE
Payer: MEDICAID

## 2022-11-06 VITALS
RESPIRATION RATE: 16 BRPM | HEIGHT: 66 IN | TEMPERATURE: 80 F | HEART RATE: 116 BPM | SYSTOLIC BLOOD PRESSURE: 108 MMHG | DIASTOLIC BLOOD PRESSURE: 77 MMHG | OXYGEN SATURATION: 99 % | WEIGHT: 141.1 LBS

## 2022-11-06 DIAGNOSIS — Z98.891 HISTORY OF UTERINE SCAR FROM PREVIOUS SURGERY: Chronic | ICD-10-CM

## 2022-11-06 LAB
APPEARANCE UR: CLEAR — SIGNIFICANT CHANGE UP
BILIRUB UR-MCNC: NEGATIVE — SIGNIFICANT CHANGE UP
COLOR SPEC: YELLOW — SIGNIFICANT CHANGE UP
DIFF PNL FLD: NEGATIVE — SIGNIFICANT CHANGE UP
GLUCOSE UR QL: NEGATIVE — SIGNIFICANT CHANGE UP
KETONES UR-MCNC: NEGATIVE — SIGNIFICANT CHANGE UP
LEUKOCYTE ESTERASE UR-ACNC: NEGATIVE — SIGNIFICANT CHANGE UP
NITRITE UR-MCNC: NEGATIVE — SIGNIFICANT CHANGE UP
PH UR: 7 — SIGNIFICANT CHANGE UP (ref 5–8)
PROT UR-MCNC: NEGATIVE — SIGNIFICANT CHANGE UP
SP GR SPEC: 1.01 — SIGNIFICANT CHANGE UP (ref 1.01–1.02)
UROBILINOGEN FLD QL: NEGATIVE — SIGNIFICANT CHANGE UP

## 2022-11-06 PROCEDURE — 96374 THER/PROPH/DIAG INJ IV PUSH: CPT

## 2022-11-06 PROCEDURE — 81025 URINE PREGNANCY TEST: CPT

## 2022-11-06 PROCEDURE — 87086 URINE CULTURE/COLONY COUNT: CPT

## 2022-11-06 PROCEDURE — 99284 EMERGENCY DEPT VISIT MOD MDM: CPT | Mod: 25

## 2022-11-06 PROCEDURE — 96375 TX/PRO/DX INJ NEW DRUG ADDON: CPT

## 2022-11-06 PROCEDURE — 99284 EMERGENCY DEPT VISIT MOD MDM: CPT

## 2022-11-06 RX ORDER — FAMOTIDINE 10 MG/ML
20 INJECTION INTRAVENOUS ONCE
Refills: 0 | Status: COMPLETED | OUTPATIENT
Start: 2022-11-06 | End: 2022-11-06

## 2022-11-06 RX ORDER — ONDANSETRON 8 MG/1
4 TABLET, FILM COATED ORAL ONCE
Refills: 0 | Status: COMPLETED | OUTPATIENT
Start: 2022-11-06 | End: 2022-11-06

## 2022-11-06 RX ADMIN — ONDANSETRON 4 MILLIGRAM(S): 8 TABLET, FILM COATED ORAL at 09:23

## 2022-11-06 RX ADMIN — FAMOTIDINE 100 MILLIGRAM(S): 10 INJECTION INTRAVENOUS at 10:00

## 2022-11-06 RX ADMIN — Medication 2 MILLIGRAM(S): at 09:23

## 2022-11-06 NOTE — ED PROVIDER NOTE - CPE EDP EYES NORM
Reason for visit: lactation consult/maternal education     Breastfeeding experience/Education: prime; no experience     Mother's Breastfeeding Goal: as long as possible     Health History (pertinent to milk production): hx of THC (last used April 2019)    Breast Assessment: equal, everted nipples; intact and not inverted     Feeding Assessment:  DURGA. Infant was fed formula at 1000. Infant is swaddled and held, not showing any cues. Mom states pain when he was nursing. Discussed to make sure the bottom lip is out, to have infant closer to the breast, and to make sure his mouth is wide open. Mom verbalized understanding. Mom reports she has not  infant since last evening but would like to keep trying. Mom would like to breastfeed with the next feeding. Encouraged to call so RN can assess/assist with feeding. Mom asked if there's another option to breastfeeding if that does not work out. Discussed pumping. Mom will see how the next feeding goes to see if she needs to pump. Mom has private insurance and Medicaid. Does not have a breast pump, encouraged her to contact her private insurance for a pump. Mom verbalized understanding. Lanolin and gel pads given with instructions. Lots of support and encouragement given. encouraged to call with any questions and concerns. Feeding Plan of Care:  To feed 8-12 times in 24 hours and on demand     Education:      8-12 feedings minimum per 24 hours x first 2 weeks, Reinforce sore nipple management, Feed on first breast without time restriction, offer second breast, Observe visible suck/ audible swallow, Jaundice - frequent feedings, Engorgement - Breastfeeding: heat, massage, expression, ice, frequent nursing, Ibuprofen if prescribed, Correct latch on and positioning, Manual expression, Pumping, How to tell if infant getting enough and Use of sterilizer bag after discharge, A New Beginning book     Time spent with pt: 17 min     Hannah Graham, RN, BSN, [employer] normal...

## 2022-11-06 NOTE — ED ADULT TRIAGE NOTE - TEMPERATURE IN CELSIUS (DEGREES C)
Smokes rare cigarettes per day. Greater than 3 minutes spent counseling patient on dangers of continued tobacco abuse.   26.6

## 2022-11-06 NOTE — ED PROVIDER NOTE - PATIENT PORTAL LINK FT
You can access the FollowMyHealth Patient Portal offered by [facility] by registering at the following website: [email or web address]. By joining [email or web address]’s FollowMyHealth portal, you will also be able to view your health information using other applications (apps) compatible with our system.

## 2022-11-06 NOTE — ED PROVIDER NOTE - OBJECTIVE STATEMENT
The patient is a 37y Female who has a past medical and surgery history of Gastritis Kidney stone freqUTIs C section Cyclical vomiting syndrome  PTED with symptoms c/w previous bouts of CVS

## 2022-11-06 NOTE — ED ADULT NURSE NOTE - NSIMPLEMENTINTERV_GEN_ALL_ED
Implemented All Universal Safety Interventions:  [place] to call system. Call bell, personal items and telephone within reach. Instruct patient to call for assistance. Room bathroom lighting operational. Non-slip footwear when patient is off stretcher. Physically safe environment: no spills, clutter or unnecessary equipment. Stretcher in lowest position, wheels locked, appropriate side rails in place.

## 2022-11-06 NOTE — ED PROVIDER NOTE - NSFOLLOWUPINSTRUCTIONS_ED_ALL_ED_FT
Cyclic Vomiting Syndrome    WHAT YOU NEED TO KNOW: Cyclic vomiting syndrome is a condition that causes you to vomit many times in a row for no known reason. It is important to prevent dehydration and other serious complications from repeated vomiting. Work with your healthcare provider to manage or prevent episodes.    NOTE It is important to know that repeated use of marijuana over a long period of time (chronic use) can cause what is known as the cannabis hyperemesis syndrome which is similar to cyclical vomiting described below. If you have an episode caused by marijuana use, a hot shower may relieve your symptoms. Ask your healthcare provider for information if want to quit using marijuana and need help quitting.    DISCHARGE INSTRUCTIONS:    Seek care immediately if:    You see blood in your vomit or your bowel movements.    You have sudden, severe pain in your chest and upper abdomen after hard vomiting or retching.    You have swelling in your neck and chest.    You are dizzy, cold, and thirsty, and your eyes and mouth are dry.    You are urinating very little or not at all.    You have muscle weakness, leg cramps, and trouble breathing.    Your heart is beating much faster than normal.    You continue to vomit for more than 48 hours.  Contact your healthcare provider if:    You have frequent dry heaves (vomiting but nothing comes out).    You have questions or concerns about your condition or care.  Medicines: You may need any of the following:    Migraine medicine may be used to prevent or stop migraine headaches. This may be given if you have migraines or are at risk because of a family history of migraines. You may need to take this medicine to prevent migraines or to stop a migraine that has started.    Antinausea medicine may be needed to control your nausea.    Medicine may be given to control the amount of acid your stomach makes.    Take your medicine as directed. Contact your healthcare provider if you think your medicine is not helping or if you have side effects. Tell him or her if you are allergic to any medicine. Keep a list of the medicines, vitamins, and herbs you take. Include the amounts, and when and why you take them. Bring the list or the pill bottles to follow-up visits. Carry your medicine list with you in case of an emergency.  Prevent or manage episodes:    Avoid triggers. Certain foods can trigger episodes, such as chocolate, cheese, and monosodium glutamate (MSG). Caffeine can also trigger an episode. Your healthcare provider or dietitian can help you identify foods that trigger an episode. This will help you create meal plans to avoid those triggers. Other triggers include too much exercise, motion sickness, or being in hot weather too long.    Drink more liquids as directed. Vomiting can lead to dehydration. It is important to drink more liquids to help replace lost body fluids. Ask your healthcare provider how much liquid to drink each day and which liquids are best for you. Your provider may recommend that you drink an oral rehydration solution (ORS). ORS contains water, salts, and sugar that are needed to replace the lost body fluids. Ask what kind of ORS to use, how much to drink, and where to get it.    Eat smaller meals, more often. Eat small amounts of food every 2 to 3 hours, even if you are not hungry. Food in your stomach may decrease your nausea.    Control stress. Stress or anxiety can trigger an episode. Find ways to relax and manage your stress. Get more rest and sleep.    Do not drink alcohol. Alcohol may upset or irritate your stomach. Too much alcohol can also cause nausea and vomiting.    Do not use marijuana (cannabis). Repeated use of marijuana over a long period of time (chronic use) can cause episodes. This is called cannabis hyperemesis syndrome. If you have an episode caused by marijuana use, a hot shower may relieve your symptoms. Ask your healthcare provider for information if want to quit using marijuana and need help quitting.  Follow up with your healthcare provider as directed: Write down your questions so you remember to ask them during your visits.

## 2022-11-06 NOTE — ED ADULT NURSE REASSESSMENT NOTE - NS ED NURSE REASSESS COMMENT FT1
patient refusing blood work at this time due to lack of insurance coverage. Will allow for IV placement, medications and urine sample. MD Micheal billings, #22 IV placed in R. top of hand, per patient request, urine provided, collected and sent to lab, urine pregnancy running at this time.

## 2022-11-06 NOTE — ED ADULT NURSE NOTE - OBJECTIVE STATEMENT
37 yr old female ambulatory to the ED A&Ox4 presents with generalized back pain since last night. Pt denies any fevers or chill.  No urinary symptoms.  States she has had these symptoms before and she just needs IV meds.  Refusing blood work.  No acute resp distress noted. Resp even and unlabored. Skin warm, dry and normal for race. 22 G IV placed to RT hand.

## 2022-11-06 NOTE — ED PROVIDER NOTE - CLINICAL SUMMARY MEDICAL DECISION MAKING FREE TEXT BOX
The patient is a 37y Female who has a past medical and surgery history of Gastritis Kidney stone freqUTIs C section PTED with symptoms c/w previous bouts of CVS  T(F): 79.9 BP: 108/77 RR: 16 SpO2: 99% (06 Nov 2022 08:13) (99% - 99  PE: as described; my additions and exceptions are noted in the chart    DATA:  LAB: see pullset    IMPRESSION/RISK:  Dx= CVS    Consideration include Shared decision making with patient will attempt arresting of symptoms before undertaking extensive workup to r/o UTI or surgical pathology as all these workup in the past are negative and patient has insight into her condition  Plan  Gave patient the "cocktail" that works for her CVS with good effect pt with meds at home can d/c with instructions to return

## 2022-11-07 LAB
CULTURE RESULTS: SIGNIFICANT CHANGE UP
SPECIMEN SOURCE: SIGNIFICANT CHANGE UP

## 2022-12-08 NOTE — ED ADULT TRIAGE NOTE - NSWEIGHTCALCTOOLDRUG_GEN_A_CORE
[facility]  305/01  HOSPITAL MEDICINE PROGRESS NOTE   Patient: Micheal Sibley  Today's Date: 12/7/2022    YOB: 1933  Admission Date: 12/1/2022    MRN: [id number]  Inpatient LOS: 6    Attending: Mohini Crawford MD  Hospital Day: Hospital Day: 7    Subjective   HISTORY AND SUBJECTIVE COMPLAINTS     Chief Complaint:   Septic shock    Interval History / Subjective:   She is doing well this morning .  Alert , awake , oriented.  No major issues overnight.  Denies fever, chills, nausea vomiting or any diarrhea.  Hospital Course:  Micheal Sibley is a 89 year old female who presented on 12/1/2022 with complaints of Altered Mental Status  .    ROS:  Pertinent systems negative except as above.    Objective   PHYSICAL EXAMINATION     Vital 24 Hour Range Most Recent Value   Temperature Temp  Min: 97.4 °F (36.3 °C)  Max: 98.4 °F (36.9 °C) 97.4 °F (36.3 °C)   Pulse Pulse  Min: 83  Max: 89 83   Respiratory Resp  Min: 18  Max: 18 18   Blood Pressure BP  Min: 127/81  Max: 130/82 130/82   Pulse Oximetry SpO2  Min: 90 %  Max: 93 % 93 %   Arterial BP No data recorded     O2 No data recorded       Recorded Intake and Output:    Intake/Output Summary (Last 24 hours) at 12/7/2022 1925  Last data filed at 12/7/2022 1437  Gross per 24 hour   Intake 570 ml   Output --   Net 570 ml      Recorded Last Stool Occurrence: 1 (12/07/22 1827)     Vital Most Recent Value First Value   Weight 53.3 kg (117 lb 9.6 oz) Weight: 51.3 kg (113 lb)   Height 4' 11\" (149.9 cm) Height: 4' 11\" (149.9 cm)   BMI 23.75 N/A     GEN.not in any acute distress  HEENT-not pale,unicteric,no JVD  CVS-S1+ S2 N  CHEST-Adequate air entry bilaterally with no wheezes or crepitations  ABD-soft non-tender, nor organomegally  EXT-no pedal edema    TEST RESULTS     Labs: The Laboratory values listed below have been reviewed and pertinent findings discussed in the Assessment and Plan.    Laboratory values:   Recent Labs   Lab 12/07/22  0541 12/06/22  0543  12/05/22  0532   WBC 14.0* 10.3 11.6*   HGB 10.9* 10.8* 12.2   HCT 32.3* 32.4* 35.9*    242 313       Recent Labs   Lab 12/05/22  0532 12/04/22  0557 12/03/22  0536 12/02/22  0509 12/01/22  0952   SODIUM 143 140 138   < > 128*   POTASSIUM 4.1 4.1 3.9   < > 5.1   CHLORIDE 108 110 105   < > 91*   CO2 25 27 22   < > 23   CALCIUM 9.8 9.4 9.0   < > 9.4   GLUCOSE 137* 138* 112*   < > 145*   BUN 90* 89* 88*   < > 97*   CREATININE 1.10* 1.39* 1.75*   < > 3.17*   MG  --   --   --   --  2.3    < > = values in this interval not displayed.        Recent Labs   Lab 12/05/22  0532 12/04/22  0557 12/03/22  0536   ALBUMIN 2.1* 1.7* 1.6*   AST 59* 63* 90*   GPT 76* 67* 68*   BILIRUBIN 0.5 0.3 0.4     Recent Labs   Lab 12/04/22  0557 12/01/22  0952   PCT 0.48* 3.22*   LACTA  --  1.8   CRP 7.5*  --        Radiology: Imaging studies have been reviewed and pertinent findings discussed in the Assessment and Plan.  No results found for any visits on 12/01/22 (from the past 48 hour(s)).     ANCILLARY ORDERS     Diet:  3 Times/day W Meals; Ensure Plus Hp/standard Oral Supplement Oral Nutrition Supplement  Regular, Dysphagia 1/pureed; Meds Crushed - Fatigues Quickly Diet  Nursing To Pass Tray - Feed Patient  Telemetry: On  Consults:    IP CONSULT TO CASE MANAGEMENT  IP CONSULT TO SMOKING CESSATION PROGRAM  IP CONSULT TO SOCIAL WORK  IP CONSULT TO PHARMACY  Therapy Orders:   PT and OT Orders Placed this Encounter   Procedures   • Occupational Therapy   • Physical Therapy       ADVANCED DIRECTIVES     Code Status: Do Not Resuscitate             ASSESSMENT AND PLAN      1.COVID-19 pneumonia  Patient status post remdesivir  Doing well.  Will continue Decadron to completed 10 days course  Continue Omnicef and doxycycline for possible superimposed bacterial infection  Currently of oxygen     2.Septic shock-resolved    3.Hypothermia-resolved     4.Acute hypoxic respiratory failure-resolved on room air.  Continue Decadron to complete 10 days  course    5.Acute kidney injury-resolved       6.Hyponatremia with hypochloremia-resolved    7.Acute metabolic encephalopathy-resolved     8.Escherichia coli UTI-completed Omnicef    9.Dyphagia-much improved with pureed diet    10. Generalized Weakness-due to deconditioning  Continue PT/OT  For possible rehab placement    Smoking status: former smoker    Nutrition status: appropriate  Body mass index is 23.75 kg/m². - appropriate weight BMI 18.5-24  DVT Prophylaxis: Lovenox prophylactic dosing (dose adjusted as per renal function)         DISCHARGE PLANNING     The patient's treatment plans were discussed with patient and RN.    Discharge Planning       Barriers to discharge: Patient is not medically ready and needs to remain in the hospital today due to Hypoxia and Hypothermia  Anticipated discharge destination:  TBD  Expected Discharge Date: TBD                Dexter Crawford MD  Hospitalist  12/7/2022  7:25 PM    used

## 2022-12-12 ENCOUNTER — EMERGENCY (EMERGENCY)
Facility: HOSPITAL | Age: 37
LOS: 1 days | Discharge: HOME | End: 2022-12-12
Attending: STUDENT IN AN ORGANIZED HEALTH CARE EDUCATION/TRAINING PROGRAM | Admitting: EMERGENCY MEDICINE
Payer: MEDICAID

## 2022-12-12 VITALS
HEIGHT: 66 IN | OXYGEN SATURATION: 97 % | WEIGHT: 130.07 LBS | RESPIRATION RATE: 18 BRPM | HEART RATE: 109 BPM | TEMPERATURE: 99 F | SYSTOLIC BLOOD PRESSURE: 112 MMHG | DIASTOLIC BLOOD PRESSURE: 83 MMHG

## 2022-12-12 VITALS
RESPIRATION RATE: 18 BRPM | DIASTOLIC BLOOD PRESSURE: 78 MMHG | HEART RATE: 93 BPM | OXYGEN SATURATION: 99 % | SYSTOLIC BLOOD PRESSURE: 110 MMHG | TEMPERATURE: 98 F

## 2022-12-12 DIAGNOSIS — Z98.891 HISTORY OF UTERINE SCAR FROM PREVIOUS SURGERY: Chronic | ICD-10-CM

## 2022-12-12 LAB
ALBUMIN SERPL ELPH-MCNC: 3.2 G/DL — LOW (ref 3.3–5)
ALP SERPL-CCNC: 55 U/L — SIGNIFICANT CHANGE UP (ref 40–120)
ALT FLD-CCNC: 13 U/L — SIGNIFICANT CHANGE UP (ref 10–45)
ANION GAP SERPL CALC-SCNC: 8 MMOL/L — SIGNIFICANT CHANGE UP (ref 5–17)
AST SERPL-CCNC: 22 U/L — SIGNIFICANT CHANGE UP (ref 10–40)
B-OH-BUTYR SERPL-SCNC: 1.3 MMOL/L — HIGH
BASE EXCESS BLDV CALC-SCNC: 1.2 MMOL/L — SIGNIFICANT CHANGE UP (ref -2–3)
BASOPHILS # BLD AUTO: 0.02 K/UL — SIGNIFICANT CHANGE UP (ref 0–0.2)
BASOPHILS NFR BLD AUTO: 0.3 % — SIGNIFICANT CHANGE UP (ref 0–2)
BILIRUB SERPL-MCNC: 0.4 MG/DL — SIGNIFICANT CHANGE UP (ref 0.2–1.2)
BUN SERPL-MCNC: 8 MG/DL — SIGNIFICANT CHANGE UP (ref 7–23)
CALCIUM SERPL-MCNC: 7.8 MG/DL — LOW (ref 8.4–10.5)
CHLORIDE SERPL-SCNC: 105 MMOL/L — SIGNIFICANT CHANGE UP (ref 96–108)
CK SERPL-CCNC: 77 U/L — SIGNIFICANT CHANGE UP (ref 25–170)
CO2 BLDV-SCNC: 28 MMOL/L — HIGH (ref 22–26)
CO2 SERPL-SCNC: 24 MMOL/L — SIGNIFICANT CHANGE UP (ref 22–31)
CREAT SERPL-MCNC: 0.48 MG/DL — LOW (ref 0.5–1.3)
EGFR: 125 ML/MIN/1.73M2 — SIGNIFICANT CHANGE UP
EOSINOPHIL # BLD AUTO: 0.01 K/UL — SIGNIFICANT CHANGE UP (ref 0–0.5)
EOSINOPHIL NFR BLD AUTO: 0.1 % — SIGNIFICANT CHANGE UP (ref 0–6)
FLUAV AG NPH QL: DETECTED
FLUBV AG NPH QL: SIGNIFICANT CHANGE UP
GAS PNL BLDV: SIGNIFICANT CHANGE UP
GLUCOSE SERPL-MCNC: 76 MG/DL — SIGNIFICANT CHANGE UP (ref 70–99)
HCG SERPL-ACNC: <1 MIU/ML — SIGNIFICANT CHANGE UP
HCO3 BLDV-SCNC: 27 MMOL/L — SIGNIFICANT CHANGE UP (ref 22–29)
HCT VFR BLD CALC: 45.2 % — HIGH (ref 34.5–45)
HGB BLD-MCNC: 14.4 G/DL — SIGNIFICANT CHANGE UP (ref 11.5–15.5)
HOROWITZ INDEX BLDV+IHG-RTO: 21 — SIGNIFICANT CHANGE UP
IMM GRANULOCYTES NFR BLD AUTO: 0.3 % — SIGNIFICANT CHANGE UP (ref 0–0.9)
LACTATE SERPL-SCNC: 1.9 MMOL/L — SIGNIFICANT CHANGE UP (ref 0.7–2)
LIDOCAIN IGE QN: 67 U/L — LOW (ref 73–393)
LYMPHOCYTES # BLD AUTO: 1.33 K/UL — SIGNIFICANT CHANGE UP (ref 1–3.3)
LYMPHOCYTES # BLD AUTO: 17 % — SIGNIFICANT CHANGE UP (ref 13–44)
MAGNESIUM SERPL-MCNC: 1.5 MG/DL — LOW (ref 1.6–2.6)
MCHC RBC-ENTMCNC: 29.2 PG — SIGNIFICANT CHANGE UP (ref 27–34)
MCHC RBC-ENTMCNC: 31.9 GM/DL — LOW (ref 32–36)
MCV RBC AUTO: 91.7 FL — SIGNIFICANT CHANGE UP (ref 80–100)
MONOCYTES # BLD AUTO: 0.82 K/UL — SIGNIFICANT CHANGE UP (ref 0–0.9)
MONOCYTES NFR BLD AUTO: 10.5 % — SIGNIFICANT CHANGE UP (ref 2–14)
NEUTROPHILS # BLD AUTO: 5.62 K/UL — SIGNIFICANT CHANGE UP (ref 1.8–7.4)
NEUTROPHILS NFR BLD AUTO: 71.8 % — SIGNIFICANT CHANGE UP (ref 43–77)
NRBC # BLD: 0 /100 WBCS — SIGNIFICANT CHANGE UP (ref 0–0)
NT-PROBNP SERPL-SCNC: 15 PG/ML — SIGNIFICANT CHANGE UP (ref 0–300)
PCO2 BLDV: 47 MMHG — HIGH (ref 39–42)
PH BLDV: 7.36 — SIGNIFICANT CHANGE UP (ref 7.32–7.43)
PHOSPHATE SERPL-MCNC: 2.5 MG/DL — SIGNIFICANT CHANGE UP (ref 2.5–4.5)
PLATELET # BLD AUTO: 328 K/UL — SIGNIFICANT CHANGE UP (ref 150–400)
PO2 BLDV: <35 MMHG — SIGNIFICANT CHANGE UP (ref 25–45)
POTASSIUM SERPL-MCNC: 3.6 MMOL/L — SIGNIFICANT CHANGE UP (ref 3.5–5.3)
POTASSIUM SERPL-SCNC: 3.6 MMOL/L — SIGNIFICANT CHANGE UP (ref 3.5–5.3)
PROT SERPL-MCNC: 6.7 G/DL — SIGNIFICANT CHANGE UP (ref 6–8.3)
RBC # BLD: 4.93 M/UL — SIGNIFICANT CHANGE UP (ref 3.8–5.2)
RBC # FLD: 13.9 % — SIGNIFICANT CHANGE UP (ref 10.3–14.5)
RSV RNA NPH QL NAA+NON-PROBE: SIGNIFICANT CHANGE UP
SAO2 % BLDV: 43.7 % — LOW (ref 67–88)
SARS-COV-2 RNA SPEC QL NAA+PROBE: SIGNIFICANT CHANGE UP
SODIUM SERPL-SCNC: 137 MMOL/L — SIGNIFICANT CHANGE UP (ref 135–145)
TROPONIN I, HIGH SENSITIVITY RESULT: 4.6 NG/L — SIGNIFICANT CHANGE UP
WBC # BLD: 7.82 K/UL — SIGNIFICANT CHANGE UP (ref 3.8–10.5)
WBC # FLD AUTO: 7.82 K/UL — SIGNIFICANT CHANGE UP (ref 3.8–10.5)

## 2022-12-12 PROCEDURE — 84702 CHORIONIC GONADOTROPIN TEST: CPT

## 2022-12-12 PROCEDURE — 99285 EMERGENCY DEPT VISIT HI MDM: CPT

## 2022-12-12 PROCEDURE — 83880 ASSAY OF NATRIURETIC PEPTIDE: CPT

## 2022-12-12 PROCEDURE — 84484 ASSAY OF TROPONIN QUANT: CPT

## 2022-12-12 PROCEDURE — 82803 BLOOD GASES ANY COMBINATION: CPT

## 2022-12-12 PROCEDURE — 71045 X-RAY EXAM CHEST 1 VIEW: CPT | Mod: 26

## 2022-12-12 PROCEDURE — 96374 THER/PROPH/DIAG INJ IV PUSH: CPT

## 2022-12-12 PROCEDURE — 83605 ASSAY OF LACTIC ACID: CPT

## 2022-12-12 PROCEDURE — 96375 TX/PRO/DX INJ NEW DRUG ADDON: CPT

## 2022-12-12 PROCEDURE — 87637 SARSCOV2&INF A&B&RSV AMP PRB: CPT

## 2022-12-12 PROCEDURE — 93010 ELECTROCARDIOGRAM REPORT: CPT

## 2022-12-12 PROCEDURE — 36415 COLL VENOUS BLD VENIPUNCTURE: CPT

## 2022-12-12 PROCEDURE — 71045 X-RAY EXAM CHEST 1 VIEW: CPT

## 2022-12-12 PROCEDURE — 93005 ELECTROCARDIOGRAM TRACING: CPT

## 2022-12-12 PROCEDURE — 83735 ASSAY OF MAGNESIUM: CPT

## 2022-12-12 PROCEDURE — 83690 ASSAY OF LIPASE: CPT

## 2022-12-12 PROCEDURE — 84100 ASSAY OF PHOSPHORUS: CPT

## 2022-12-12 PROCEDURE — 82010 KETONE BODYS QUAN: CPT

## 2022-12-12 PROCEDURE — 99285 EMERGENCY DEPT VISIT HI MDM: CPT | Mod: 25

## 2022-12-12 PROCEDURE — 85025 COMPLETE CBC W/AUTO DIFF WBC: CPT

## 2022-12-12 PROCEDURE — 80053 COMPREHEN METABOLIC PANEL: CPT

## 2022-12-12 PROCEDURE — 96361 HYDRATE IV INFUSION ADD-ON: CPT

## 2022-12-12 PROCEDURE — 82550 ASSAY OF CK (CPK): CPT

## 2022-12-12 RX ORDER — ONDANSETRON 8 MG/1
4 TABLET, FILM COATED ORAL ONCE
Refills: 0 | Status: COMPLETED | OUTPATIENT
Start: 2022-12-12 | End: 2022-12-12

## 2022-12-12 RX ORDER — SODIUM CHLORIDE 9 MG/ML
1000 INJECTION INTRAMUSCULAR; INTRAVENOUS; SUBCUTANEOUS ONCE
Refills: 0 | Status: COMPLETED | OUTPATIENT
Start: 2022-12-12 | End: 2022-12-12

## 2022-12-12 RX ORDER — CEPHALEXIN 500 MG
500 CAPSULE ORAL ONCE
Refills: 0 | Status: COMPLETED | OUTPATIENT
Start: 2022-12-12 | End: 2022-12-12

## 2022-12-12 RX ORDER — CEPHALEXIN 500 MG
1 CAPSULE ORAL
Qty: 14 | Refills: 0
Start: 2022-12-12 | End: 2022-12-18

## 2022-12-12 RX ORDER — MAGNESIUM SULFATE 500 MG/ML
1 VIAL (ML) INJECTION ONCE
Refills: 0 | Status: COMPLETED | OUTPATIENT
Start: 2022-12-12 | End: 2022-12-12

## 2022-12-12 RX ORDER — KETOROLAC TROMETHAMINE 30 MG/ML
15 SYRINGE (ML) INJECTION ONCE
Refills: 0 | Status: DISCONTINUED | OUTPATIENT
Start: 2022-12-12 | End: 2022-12-12

## 2022-12-12 RX ADMIN — Medication 15 MILLIGRAM(S): at 14:17

## 2022-12-12 RX ADMIN — SODIUM CHLORIDE 1000 MILLILITER(S): 9 INJECTION INTRAMUSCULAR; INTRAVENOUS; SUBCUTANEOUS at 14:17

## 2022-12-12 RX ADMIN — Medication 100 GRAM(S): at 13:52

## 2022-12-12 RX ADMIN — ONDANSETRON 4 MILLIGRAM(S): 8 TABLET, FILM COATED ORAL at 11:45

## 2022-12-12 RX ADMIN — Medication 500 MILLIGRAM(S): at 13:53

## 2022-12-12 RX ADMIN — SODIUM CHLORIDE 1000 MILLILITER(S): 9 INJECTION INTRAMUSCULAR; INTRAVENOUS; SUBCUTANEOUS at 11:45

## 2022-12-12 RX ADMIN — SODIUM CHLORIDE 1000 MILLILITER(S): 9 INJECTION INTRAMUSCULAR; INTRAVENOUS; SUBCUTANEOUS at 13:52

## 2022-12-12 RX ADMIN — Medication 15 MILLIGRAM(S): at 11:45

## 2022-12-12 NOTE — ED PROVIDER NOTE - PHYSICAL EXAMINATION
GENERAL: non-toxic appearing, in NAD  HEAD: atraumatic, normocephalic  EYES: vision grossly intact, no conjunctivitis or discharge  EARS: hearing grossly intact  NOSE: no nasal discharge, epistaxis   CARDIAC: low grade tachycardia, normotensive, normal S1S2,  no appreciable murmurs, no cyanosis, cap refill < 2 seconds  PULM: no respiratory distress, oxygen saturation on RA wnl, CTAB, no crackles, rales, rhonchi, or wheezing  GI: abdomen nondistended, soft, nontender, no guarding or rebound tenderness, no palpable masses  NEURO: awake and alert, follows commands, normal speech, PERRLA, EOMI, no focal motor or sensory deficits, normal gait  MSK: spine appears normal, no joint swelling or erythema, no gross deformities of extremities  EXT: no peripheral edema, calf tenderness, redness or swelling  SKIN: warm, dry, and intact, no rashes  PSYCH: appropriate mood and affect

## 2022-12-12 NOTE — ED ADULT TRIAGE NOTE - TEMPERATURE IN CELSIUS (DEGREES C)
53 year old male with h/o ESRD on HD(MWF), HTN admitted due to missed dialysis; last HD was on 2/9 37

## 2022-12-12 NOTE — ED PROVIDER NOTE - OBJECTIVE STATEMENT
37F hx of cyclical vomiting syndrome presenting with 4 days of fever, chills, myalgias (arm, leg and diffuse back pain), headache, urinary dysuria, chest tightness and dyspnea. Had nausea, vomiting and diarrhea as well, which have now resolved. No abdominal pain. Describes the chest tightness as a feeling like she cannot take a deep breath. No recent travel.  is sick with similar symptoms. No leg swelling.

## 2022-12-12 NOTE — ED PROVIDER NOTE - NSFOLLOWUPINSTRUCTIONS_ED_ALL_ED_FT
You were seen in the Emergency Department for viral symptoms. You have the flu.    - Please follow up with your Primary Care Doctor in 2-3 days.    - Tylenol up to 650 mg every 8 hours as needed for pain and/or Motrin up to 600 mg every 6 hours as needed for pain.     - Take any prescribed medications as instructed:     - Be sure to return to the ED if you develop new or worsening symptoms.     - Specific signs and symptoms to be vigilant of: fever or chills, chest pain, difficulty breathing, palpitations, loss of consciousness, headache, vision changes, slurred speech, difficulty swallowing or drooling, facial droop, weakness in the arms or legs, numbness or tingling, abdominal pain, nausea or vomiting, diarrhea, constipation, blood in the stool or urine, pain on urination, difficulty urinating.

## 2022-12-12 NOTE — ED PROVIDER NOTE - CLINICAL SUMMARY MEDICAL DECISION MAKING FREE TEXT BOX
37F hx of cyclical vomiting syndrome presenting with 4 days of fever, chills, myalgias (arm, leg and diffuse back pain), headache, urinary dysuria, chest tightness and dyspnea. Exam: nontoxic appearing, afebrile, low grade tachycardia but normotensive, lungs CTAB, abdomen benign, dry mucous membranes. Will check basics, UA, EKG/trop to eval for myocarditis, CXR, IVF, toradol, zofran, po challenge, reassess, disposition pending work-up and response to treatment.

## 2022-12-12 NOTE — ED PROVIDER NOTE - NS ED ROS FT
GENERAL: + fever, chills  HEENT: + cough  CARDIAC: + chest pain  PULM: + dyspnea  GI: no abdominal pain, nausea, vomiting, diarrhea, constipation, melena, hematochezia  : + urinary dysuria  NEURO: + headache  MSK: + myalgias  SKIN: no rashes  HEME: no active bleeding, excessive bruising

## 2022-12-12 NOTE — ED PROVIDER NOTE - PROGRESS NOTE DETAILS
Has the flu. Patient feeling better but still dehydrated. Will give 2nd liter of fluid. Safe to discharge. All lab and imaging results reviewed with the patient. The patient was provided an opportunity to ask questions and voice their concerns, all of which were addressed at length. Strict return precautions discussed with the patient. The patient verbalized understanding of the plan and agrees to follow through with it. The patient is safe for discharge at this time with close outpatient follow up.

## 2022-12-12 NOTE — ED PROVIDER NOTE - PATIENT PORTAL LINK FT
You can access the FollowMyHealth Patient Portal offered by [facility] by registering at the following website: [email or web address]. By joining [facility]’s FollowMyHealth portal, you will also be able to view your health information using other applications (apps) compatible with our system.

## 2022-12-18 NOTE — ED PROVIDER NOTE - CROS ED ROS STATEMENT
_                         Today's Date: 12/18/2022  Patient Name: Chinedu Miner  Date of admission: 12/9/2022  5:11 PM  Patient's age: 80 y.o., 1941  Admission Dx: Diverticulosis of colon [K57.30]  Other ascites [R18.8]  Lower abdominal pain [R10.30]  Failure to thrive in adult [R62.7]  Ascites of liver [R18.8]      Requesting Physician: Marie Porter MD    CHIEF COMPLAINT: Abdominal pain. Weight loss. Ascites. SUBJECTIVE:  . Patient was seen and examined . Clinically stable. No events overnight. Family at bedside. Continues to have abdominal discomfort. Large ascites. Mental status with no changes. No fever. No GI bleeding. BRIEF CASE HISTORY:    The patient is a 80 y.o.  male who is admitted to the hospital for further management of increasing abdominal pain and failure to thrive. Patient is unable to give any history. History is obtained from the chart and talking to the wife and daughter. Patient presented to [facility] due to worsening abdominal pain and abdominal distention since October. He had weight loss and decreased appetite. Patient had recurrent episodes of vomiting yellow mucus and multiple episodes of diarrhea. He had no fever. He had abdominal CT scan which showed ascites. There was no perforation. No bowel obstruction. Patient had evaluation by ID and by gastroenterology. He will have EGD tomorrow. Last colonoscopy was 2017. Patient had multiple comorbidities including coronary artery disease and benign prostate hypertrophy as well as hyperlipidemia. And history of Alzheimer disease.     Past Medical History:   has a past medical history of Bladder stone, CAD (coronary artery disease), Diverticulitis, GERD (gastroesophageal reflux disease), Hyperlipidemia, Hypertension, Intermittent self-catheterization of bladder, LBBB (left bundle branch block), Malignant neoplasm of lower third of esophagus all other ROS negative except as per HPI [facility]), MI (myocardial infarction) ([facility] [address] Neuropathy, Polio, and Wears glasses. Past Surgical History:   has a past surgical history that includes Coronary artery bypass graft (2000); Coronary angioplasty with stent; Cardiac surgery (2000); Colonoscopy; Endoscopy, colon, diagnostic; Bladder stone removal; Esophagogastroduodenoscopy (12/15/2022); and Upper gastrointestinal endoscopy (N/A, 12/15/2022). Family History: family history is not on file. Social History:   reports that he has never smoked. He has never used smokeless tobacco. He reports that he does not drink alcohol and does not use drugs. Medications:    Prior to Admission medications    Medication Sig Start Date End Date Taking? Authorizing Provider   cefdinir (OMNICEF) 300 MG capsule Take 300 mg by mouth 2 times daily 14-day course filled 12/8/22   Yes Historical Provider, MD   dicyclomine (BENTYL) 10 MG capsule Take 10 mg by mouth 2 times daily as needed   Yes Historical Provider, MD   omeprazole (PRILOSEC) 40 MG delayed release capsule Take 40 mg by mouth daily   Yes Historical Provider, MD   metroNIDAZOLE (FLAGYL) 500 MG tablet Take 500 mg by mouth 2 times daily 24-day course filled 12/1/22   Yes Historical Provider, MD   pregabalin (LYRICA) 100 MG capsule Take 100 mg by mouth 2 times daily.     Historical Provider, MD   finasteride (PROSCAR) 5 MG tablet Take 5 mg by mouth daily    Historical Provider, MD   donepezil (ARICEPT) 10 MG tablet Take 10 mg by mouth 2 times daily    Historical Provider, MD   amitriptyline (ELAVIL) 25 MG tablet Take 25-50 mg by mouth nightly    Historical Provider, MD   rosuvastatin (CRESTOR) 10 MG tablet Take 10 mg by mouth daily    Historical Provider, MD   ondansetron (ZOFRAN) 4 MG tablet Take 4 mg by mouth every 8 hours as needed for Nausea or Vomiting    Historical Provider, MD   Melatonin 10 MG CAPS Take 10-20 mg by mouth nightly    Historical Provider, MD   famotidine (PEPCID) 40 MG tablet Take 40 mg by mouth 2 times daily    Historical Provider, MD   acetaminophen (TYLENOL) 500 MG tablet Take 500 mg by mouth every 6 hours as needed prn    Historical Provider, MD   Magnesium 400 MG TABS Take 400 mg by mouth Daily    Historical Provider, MD   vitamin D-3 (CHOLECALCIFEROL) 125 MCG (5000 UT) TABS Take 5,000 Units by mouth daily    Historical Provider, MD   Cyanocobalamin 2500 MCG TABS Take 5,000 mcg by mouth Daily    Historical Provider, MD   nitroGLYCERIN (NITROSTAT) 0.4 MG SL tablet Place 0.4 mg under the tongue every 5 minutes as needed for Chest pain up to max of 3 total doses. If no relief after 1 dose, call 911.  Using as needed but has not needed them    Historical Provider, MD   clopidogrel (PLAVIX) 75 MG tablet Take 75 mg by mouth daily    Historical Provider, MD   Diphenhydramine-APAP, sleep, (TYLENOL PM EXTRA STRENGTH PO) Take 1 tablet by mouth nightly as needed    Historical Provider, MD     Current Facility-Administered Medications   Medication Dose Route Frequency Provider Last Rate Last Admin    fentaNYL (SUBLIMAZE) injection 25 mcg  25 mcg IntraVENous Q4H PRN Elijah Loya MD   25 mcg at 12/18/22 0956    sodium chloride flush 0.9 % injection 5-40 mL  5-40 mL IntraVENous 2 times per day Titus Iyer MD   10 mL at 12/17/22 2033    sodium chloride flush 0.9 % injection 5-40 mL  5-40 mL IntraVENous PRN Titus Iyer MD        0.9 % sodium chloride infusion   IntraVENous PRN Titus Iyer MD        0.9 % sodium chloride bolus  50 mL IntraVENous Once Tacho Rios MD        famotidine (PEPCID) tablet 40 mg  40 mg Oral BID Tacho Rios MD   40 mg at 12/18/22 7212    rosuvastatin (CRESTOR) tablet 10 mg  10 mg Oral Daily Tacho Rios MD   10 mg at 12/17/22 0901    pantoprazole (PROTONIX) tablet 40 mg  40 mg Oral QAM AC Tacho Rios MD   40 mg at 12/18/22 0615    dextrose 5 % and 0.45 % sodium chloride infusion   IntraVENous Continuous Tacho Rios MD 75 mL/hr at 12/18/22 1141 New Bag at 12/18/22 1141 potassium chloride 10 mEq/100 mL IVPB (Peripheral Line)  10 mEq IntraVENous PRN Kati Ayoub  mL/hr at 12/18/22 1143 10 mEq at 12/18/22 1143    amitriptyline (ELAVIL) tablet 25 mg  25 mg Oral Nightly Kati Ayoub MD   25 mg at 12/17/22 2029    donepezil (ARICEPT) tablet 10 mg  10 mg Oral BID Kati Ayoub MD   10 mg at 12/18/22 0935    finasteride (PROSCAR) tablet 5 mg  5 mg Oral Daily Kati Ayoub MD   5 mg at 12/17/22 0901    magnesium oxide (MAG-OX) tablet 400 mg  400 mg Oral Daily Kati Ayoub MD   400 mg at 12/18/22 0941    melatonin tablet 9 mg  9 mg Oral Nightly Kati Ayoub MD   9 mg at 12/17/22 2021    nitroGLYCERIN (NITROSTAT) SL tablet 0.4 mg  0.4 mg SubLINGual Q5 Min PRN Kati Ayoub MD        pregabalin (LYRICA) capsule 100 mg  100 mg Oral BID Kati Ayoub MD   100 mg at 12/18/22 0934    sucralfate (CARAFATE) tablet 1 g  1 g Oral 4x Daily Kati Ayoub MD   1 g at 12/18/22 0938    enoxaparin (LOVENOX) injection 40 mg  40 mg SubCUTAneous Daily Kati Ayoub MD   40 mg at 12/18/22 2401    magnesium sulfate 1000 mg in dextrose 5% 100 mL IVPB  1,000 mg IntraVENous PRN Kati Ayoub MD   Stopped at 12/18/22 1142    ondansetron (ZOFRAN-ODT) disintegrating tablet 4 mg  4 mg Oral Q8H PRN Kati Ayoub MD        Or    [facility] injection 4 mg  4 mg IntraVENous Q6H PRN Kati Ayoub MD   4 mg at 12/17/22 1048    polyethylene glycol (GLYCOLAX) packet 17 g  17 g Oral Daily PRN Kati Ayoub MD        acetaminophen (TYLENOL) tablet 650 mg  650 mg Oral Q6H PRN Kati Ayoub MD   650 mg at 12/16/22 1233    Or    acetaminophen (TYLENOL) suppository 650 mg  650 mg Rectal Q6H PRN Kati Ayoub MD           Allergies:  Ciprofloxacin, Codeine, Morphine, Pletal [cilostazol], Sulfamethoxazole-trimethoprim, and Pcn [penicillins]    REVIEW OF SYSTEMS:      Difficult to obtain from the patient. Discussion with the wife and daughter.   No other complaints about from was mentioned above.    PHYSICAL EXAM:      BP (!) 117/59   Pulse 89   Temp 98.4 °F (36.9 °C)   Resp 20   Ht 5' 9\" (1.753 m)   Wt 159 lb (72.1 kg)   SpO2 94%   BMI 23.48 kg/m²    Temp (24hrs), Av.7 °F (37.1 °C), Min:98.4 °F (36.9 °C), Max:99 °F (37.2 °C)      General appearance - not in pain or distress. Looks cachectic. Mental status -patient is sleepy at the time of my exam.  Eyes - pupils equal and reactive, extraocular eye movements intact  Ears - bilateral TM's and external ear canals normal  Nose - normal and patent, no erythema, discharge or polyps  Mouth - mucous membranes moist, pharynx normal without lesions  Neck - supple, no significant adenopathy  Lymphatics - no palpable lymphadenopathy, no hepatosplenomegaly  Chest - clear to auscultation, no wheezes, rales or rhonchi, symmetric air entry  Heart - normal rate, regular rhythm, normal S1, S2, no murmurs, rubs, clicks or gallops  Abdomen -generalized abdominal tenderness. , no masses or organomegaly. Ascites. Neurological -no apparent focal neurological deficit  Musculoskeletal - no joint tenderness, deformity or swelling  Extremities - peripheral pulses normal, no pedal edema, no clubbing or cyanosis  Skin - normal coloration and turgor, no rashes, no suspicious skin lesions noted           DATA:      Labs:       CBC:   Recent Labs     22   WBC 11.4* 12.9*   HGB 13.0 12.4*   HCT 41.6 38.4*    363     BMP:   Recent Labs     22    136   K 4.1 3.3*   CO2 28 26   BUN 6* 5*   CREATININE 0.86 0.62*   LABGLOM >60 >60   GLUCOSE 128* 121*     PT/INR: No results for input(s): PROTIME, INR in the last 72 hours. APTT:No results for input(s): APTT in the last 72 hours.   LIVER PROFILE:  Recent Labs     22  0515   AST 23 20   ALT 11 13   LABALBU 2.5* 2.4*     MRI ABDOMEN W WO CONTRAST MRCP  Narrative: EXAMINATION:  MRI OF THE ABDOMEN WITH AND WITHOUT CONTRAST AND MRCP 2022 3:02 pm    TECHNIQUE:  Multiplanar multisequence MRI of the abdomen was performed without and with  the administration of 13 mL ProHance intravenous contrast.  After initial T2  axial and coronal images, thick slab, thin slab and 3D coronal MRCP sequences  were obtained without the administration of intravenous contrast.  MIP images  are provided for review. COMPARISON:  CT on 12/09/2022    HISTORY:  Persistent abdominal pain and ascites, concern for malignancy. FINDINGS:  Image quality degraded by motion artifact. No hepatic steatosis. No liver lesion seen. Hepatic vasculature is patent. Mild-moderate ascites. Diffuse omental reticulation at the level of the  kidneys (series 1103 image 105) with associated mild enhancement. Gallbladder is unremarkable. No biliary or pancreatic ductal dilatation. Spleen size at the upper limit of normal.  Pancreas and adrenals are  unremarkable. Kidneys demonstrate symmetric enhancement without  hydronephrosis. A large simple cyst at the inferior pole of the left kidney  measures 9.2 x 7.9 cm. No significant lymphadenopathy. Mild bowel wall  thickening in the right abdomen as seen on recent CT. Extensive colonic  diverticulosis. Osseous structures are unremarkable. Atherosclerotic  disease of the abdominal aorta without aneurysm. Bilateral gynecomastia. Susceptibility artifact from sternotomy changes. Impression: 1. Diffuse omental reticulation with associated mild enhancement. While this  could relate to the presence of mild-moderate ascites, the possibility of  omental carcinomatosis cannot be excluded. Consider omental biopsy for  further evaluation. 2. No other findings to suggest malignancy within the abdomen.             IMPRESSION:    Primary Problem  Other ascites    Active Hospital Problems    Diagnosis Date Noted    Dysphagia [R13.10] 12/17/2022     Priority: Medium    Esophageal mass [K22.89] 12/17/2022     Priority: Medium    Malignant neoplasm of lower third of esophagus ([facility] [address] [C15.5] 12/16/2022     Priority: Medium    Severe malnutrition ([address] [E43] 12/12/2022     Priority: Medium    Lower abdominal pain [R10.30] 12/10/2022     Priority: Medium    Diffuse abdominal pain [R10.84] 12/10/2022     Priority: Medium    Failure to thrive in adult [R62.7] 12/10/2022     Priority: Medium    Anorexia [R63.0] 12/10/2022     Priority: Medium    Other ascites [R18.8] 12/09/2022     Priority: Medium       RECOMMENDATIONS:  Records and labs and images were reviewed and discussed with the family. Patient was also evaluated by multiple other specialties. Appreciated. Pathology results from the biopsy done yesterday from the lower esophagus is positive for adenocarcinoma. So we are dealing with advanced metastatic lower esophageal cancer with diffuse metastasis to intra-abdominal organs. I explained to the patient and the multiple family members were at bedside today the nature of this cancer, staging, prognosis and treatment. Explained that we are dealing with stage IV metastatic esophageal cancer and treatment is palliative systemic chemoimmunotherapy. However considering patient's performance with underlying dementia and other health comorbidities, patient may have significant toxicity and further deterioration in quality of life. After lengthy discussion about his cancer and prognosis, patient and family decided to pursue hospice care. I believe this is the best option for him at the present time. Arrangement was made for hospice consultation today at 1 PM.  With increasing ascites, I think he will benefit for paracentesis and having catheter for home drainage. May be done on Monday. I will put orders for IR. Patient and family questions were answered to the best of their satisfaction and they verbalized full understanding.               Henry Armstrong MD, MD Imelda Tim

## 2023-01-06 NOTE — ED ADULT NURSE NOTE - NS ED NOTE ABUSE SUSPICION NEGLECT YN
-- DO NOT REPLY / DO NOT REPLY ALL --  -- Message is from Engagement Center Operations (ECO) --    General Patient Message:     Mom would like a follow up virtually for doctor rosemarie. Please call back t nadja.      Caller Information       Type Contact Phone/Fax    01/06/2023 03:46 PM CST Phone (Incoming) Arline Engel (Self) [phone] (M)        Alternative phone number: none     Can a detailed message be left? No    Message Turnaround:     Is it Working Hours? Yes - Working Hours     [place]:    Please give this turnaround time to the caller:   \"This message will be sent to [state Provider's name]. The clinical team will fulfill your request as soon as they review your message.\"                
No

## 2023-01-15 NOTE — ED ADULT NURSE NOTE - NS ED NURSE LEVEL OF CONSCIOUSNESS ORIENTATION
Oriented - self; Oriented - place; Oriented - time
You can access the FollowMyHealth Patient Portal offered by [facility] by registering at the following website: [email or web address]. By joining [employer]’s FollowMyHealth portal, you will also be able to view your health information using other applications (apps) compatible with our system.

## 2023-01-31 NOTE — ED PROVIDER NOTE - RESPIRATORY, MLM
Constitutional:  See HPI  Eyes:  No visual changes  ENMT: No neck pain or stiffness  Cardiac:  No chest pain  Respiratory:  No cough or respiratory distress.   GI:  No nausea, vomiting, diarrhea or abdominal pain.  :  No dysuria, frequency or burning.  MS:  No back pain.  Neuro:  No headache   Skin:  No skin rash  Except as documented in the HPI,  all other systems are negative Breath sounds clear and equal bilaterally.

## 2023-03-28 NOTE — ED ADULT NURSE NOTE - PMH
normocephalic, atraumatic, Face within normal limits,  , External ears within normal limits, no drainage bilaterally,  External nose  normal appearance, Gastritis

## 2023-04-10 NOTE — ED PROVIDER NOTE - WR ORDER STATUS 1
EICU BRIEF ADMIT NOTE:    HISTORY: Please refer to H/P and ER notes for detail. Still having periodic twitching of the face, right side    CAMERA ASSESSMENT: Two way audiovisual assessment was done: no distress    Telemetry was reviewed. Medical records including notes, labs and imaging were reviewed.    DISCUSSED with bedside nurse.    ASSESSMENT AND PLAN:    Suspected CVA, initial CT had unremarkable.  MRI ordered.  Neurology consulted.  Not able to swallow, aspirin not given.  Will change the suppository.    ? Seizures ( twitching of the right side of the face, no loss of consciousness).  The patient got a dose of Keppra in the ED without any improvement.  Neurology consulted    Diabetic ketoacidosis.  Continue IV fluids.  Continue insulin infusion.  Follow BMP.  Will replace electrolytes as needed    Lactic acidosis, likely related to dehydration.  Volume resuscitated.  Will follow lactic acid level.    Acute renal insufficiency.  Continue IV fluids and monitor urine output and creatinine.    BEST PRACTICES REVIEW:    GLYCEMIN CONTROL:  on insulin infusion  STRESS ULCER PROPHYLAXIS: Protonix  DVT PROPHYLAXIS:   subcutaneous heparin    Thank You for allowing EICU to participate in the care of the patient. Please call as needed      Mat Christianson MD  [facility]  [phone]             Performed

## 2023-05-08 NOTE — ED PROVIDER NOTE - CHPI ED SYMPTOMS POS
Abdomen , soft, nontender, nondistended , no guarding or rigidity , no masses palpable , normal bowel sounds , Liver and Spleen,  no hepatosplenomegaly , liver nontender
NAUSEA/VOMITING

## 2023-05-22 ENCOUNTER — EMERGENCY (EMERGENCY)
Facility: HOSPITAL | Age: 38
LOS: 1 days | Discharge: HOME | End: 2023-05-22
Attending: EMERGENCY MEDICINE | Admitting: EMERGENCY MEDICINE
Payer: COMMERCIAL

## 2023-05-22 VITALS
HEART RATE: 97 BPM | WEIGHT: 145.06 LBS | DIASTOLIC BLOOD PRESSURE: 80 MMHG | SYSTOLIC BLOOD PRESSURE: 117 MMHG | HEIGHT: 66 IN | RESPIRATION RATE: 16 BRPM | OXYGEN SATURATION: 100 % | TEMPERATURE: 98 F

## 2023-05-22 DIAGNOSIS — Z98.891 HISTORY OF UTERINE SCAR FROM PREVIOUS SURGERY: Chronic | ICD-10-CM

## 2023-05-22 LAB
ALBUMIN SERPL ELPH-MCNC: 3.7 G/DL — SIGNIFICANT CHANGE UP (ref 3.3–5)
ALP SERPL-CCNC: 80 U/L — SIGNIFICANT CHANGE UP (ref 40–120)
ALT FLD-CCNC: 15 U/L — SIGNIFICANT CHANGE UP (ref 10–45)
ANION GAP SERPL CALC-SCNC: 12 MMOL/L — SIGNIFICANT CHANGE UP (ref 5–17)
AST SERPL-CCNC: 16 U/L — SIGNIFICANT CHANGE UP (ref 10–40)
BASOPHILS # BLD AUTO: 0.04 K/UL — SIGNIFICANT CHANGE UP (ref 0–0.2)
BASOPHILS NFR BLD AUTO: 0.2 % — SIGNIFICANT CHANGE UP (ref 0–2)
BILIRUB SERPL-MCNC: 0.4 MG/DL — SIGNIFICANT CHANGE UP (ref 0.2–1.2)
BUN SERPL-MCNC: 10 MG/DL — SIGNIFICANT CHANGE UP (ref 7–23)
CALCIUM SERPL-MCNC: 8.9 MG/DL — SIGNIFICANT CHANGE UP (ref 8.4–10.5)
CHLORIDE SERPL-SCNC: 105 MMOL/L — SIGNIFICANT CHANGE UP (ref 96–108)
CO2 SERPL-SCNC: 22 MMOL/L — SIGNIFICANT CHANGE UP (ref 22–31)
CREAT SERPL-MCNC: 0.6 MG/DL — SIGNIFICANT CHANGE UP (ref 0.5–1.3)
EGFR: 119 ML/MIN/1.73M2 — SIGNIFICANT CHANGE UP
EOSINOPHIL # BLD AUTO: 0.01 K/UL — SIGNIFICANT CHANGE UP (ref 0–0.5)
EOSINOPHIL NFR BLD AUTO: 0.1 % — SIGNIFICANT CHANGE UP (ref 0–6)
GLUCOSE SERPL-MCNC: 86 MG/DL — SIGNIFICANT CHANGE UP (ref 70–99)
HCG SERPL-ACNC: <1 MIU/ML — SIGNIFICANT CHANGE UP
HCT VFR BLD CALC: 39.5 % — SIGNIFICANT CHANGE UP (ref 34.5–45)
HGB BLD-MCNC: 12.8 G/DL — SIGNIFICANT CHANGE UP (ref 11.5–15.5)
IMM GRANULOCYTES NFR BLD AUTO: 0.5 % — SIGNIFICANT CHANGE UP (ref 0–0.9)
LIDOCAIN IGE QN: 41 U/L — LOW (ref 73–393)
LYMPHOCYTES # BLD AUTO: 1.15 K/UL — SIGNIFICANT CHANGE UP (ref 1–3.3)
LYMPHOCYTES # BLD AUTO: 6.8 % — LOW (ref 13–44)
MCHC RBC-ENTMCNC: 28.7 PG — SIGNIFICANT CHANGE UP (ref 27–34)
MCHC RBC-ENTMCNC: 32.4 GM/DL — SIGNIFICANT CHANGE UP (ref 32–36)
MCV RBC AUTO: 88.6 FL — SIGNIFICANT CHANGE UP (ref 80–100)
MONOCYTES # BLD AUTO: 0.95 K/UL — HIGH (ref 0–0.9)
MONOCYTES NFR BLD AUTO: 5.6 % — SIGNIFICANT CHANGE UP (ref 2–14)
NEUTROPHILS # BLD AUTO: 14.72 K/UL — HIGH (ref 1.8–7.4)
NEUTROPHILS NFR BLD AUTO: 86.8 % — HIGH (ref 43–77)
NRBC # BLD: 0 /100 WBCS — SIGNIFICANT CHANGE UP (ref 0–0)
PLATELET # BLD AUTO: 370 K/UL — SIGNIFICANT CHANGE UP (ref 150–400)
POTASSIUM SERPL-MCNC: 3.6 MMOL/L — SIGNIFICANT CHANGE UP (ref 3.5–5.3)
POTASSIUM SERPL-SCNC: 3.6 MMOL/L — SIGNIFICANT CHANGE UP (ref 3.5–5.3)
PROT SERPL-MCNC: 7.4 G/DL — SIGNIFICANT CHANGE UP (ref 6–8.3)
RAPID RVP RESULT: SIGNIFICANT CHANGE UP
RBC # BLD: 4.46 M/UL — SIGNIFICANT CHANGE UP (ref 3.8–5.2)
RBC # FLD: 13.5 % — SIGNIFICANT CHANGE UP (ref 10.3–14.5)
SARS-COV-2 RNA SPEC QL NAA+PROBE: SIGNIFICANT CHANGE UP
SODIUM SERPL-SCNC: 139 MMOL/L — SIGNIFICANT CHANGE UP (ref 135–145)
WBC # BLD: 16.96 K/UL — HIGH (ref 3.8–10.5)
WBC # FLD AUTO: 16.96 K/UL — HIGH (ref 3.8–10.5)

## 2023-05-22 PROCEDURE — 80053 COMPREHEN METABOLIC PANEL: CPT

## 2023-05-22 PROCEDURE — 85025 COMPLETE CBC W/AUTO DIFF WBC: CPT

## 2023-05-22 PROCEDURE — 83690 ASSAY OF LIPASE: CPT

## 2023-05-22 PROCEDURE — 99284 EMERGENCY DEPT VISIT MOD MDM: CPT | Mod: 25

## 2023-05-22 PROCEDURE — 99284 EMERGENCY DEPT VISIT MOD MDM: CPT

## 2023-05-22 PROCEDURE — 0225U NFCT DS DNA&RNA 21 SARSCOV2: CPT

## 2023-05-22 PROCEDURE — 84702 CHORIONIC GONADOTROPIN TEST: CPT

## 2023-05-22 PROCEDURE — 36415 COLL VENOUS BLD VENIPUNCTURE: CPT

## 2023-05-22 PROCEDURE — 96375 TX/PRO/DX INJ NEW DRUG ADDON: CPT

## 2023-05-22 PROCEDURE — 96361 HYDRATE IV INFUSION ADD-ON: CPT

## 2023-05-22 PROCEDURE — 96365 THER/PROPH/DIAG IV INF INIT: CPT

## 2023-05-22 RX ORDER — KETOROLAC TROMETHAMINE 30 MG/ML
15 SYRINGE (ML) INJECTION ONCE
Refills: 0 | Status: DISCONTINUED | OUTPATIENT
Start: 2023-05-22 | End: 2023-05-22

## 2023-05-22 RX ORDER — ONDANSETRON 8 MG/1
4 TABLET, FILM COATED ORAL ONCE
Refills: 0 | Status: COMPLETED | OUTPATIENT
Start: 2023-05-22 | End: 2023-05-22

## 2023-05-22 RX ORDER — SODIUM CHLORIDE 9 MG/ML
2000 INJECTION INTRAMUSCULAR; INTRAVENOUS; SUBCUTANEOUS ONCE
Refills: 0 | Status: COMPLETED | OUTPATIENT
Start: 2023-05-22 | End: 2023-05-22

## 2023-05-22 RX ORDER — FAMOTIDINE 10 MG/ML
20 INJECTION INTRAVENOUS ONCE
Refills: 0 | Status: COMPLETED | OUTPATIENT
Start: 2023-05-22 | End: 2023-05-22

## 2023-05-22 RX ORDER — LIDOCAINE 4 G/100G
1 CREAM TOPICAL ONCE
Refills: 0 | Status: COMPLETED | OUTPATIENT
Start: 2023-05-22 | End: 2023-05-22

## 2023-05-22 RX ORDER — ACETAMINOPHEN 500 MG
1000 TABLET ORAL ONCE
Refills: 0 | Status: COMPLETED | OUTPATIENT
Start: 2023-05-22 | End: 2023-05-22

## 2023-05-22 RX ORDER — FAMOTIDINE 10 MG/ML
20 INJECTION INTRAVENOUS ONCE
Refills: 0 | Status: DISCONTINUED | OUTPATIENT
Start: 2023-05-22 | End: 2023-05-22

## 2023-05-22 RX ADMIN — SODIUM CHLORIDE 2000 MILLILITER(S): 9 INJECTION INTRAMUSCULAR; INTRAVENOUS; SUBCUTANEOUS at 11:17

## 2023-05-22 RX ADMIN — ONDANSETRON 4 MILLIGRAM(S): 8 TABLET, FILM COATED ORAL at 11:15

## 2023-05-22 RX ADMIN — Medication 15 MILLIGRAM(S): at 11:16

## 2023-05-22 RX ADMIN — Medication 1000 MILLIGRAM(S): at 11:16

## 2023-05-22 RX ADMIN — LIDOCAINE 1 PATCH: 4 CREAM TOPICAL at 11:16

## 2023-05-22 RX ADMIN — Medication 1000 MILLIGRAM(S): at 11:42

## 2023-05-22 RX ADMIN — FAMOTIDINE 20 MILLIGRAM(S): 10 INJECTION INTRAVENOUS at 12:00

## 2023-05-22 RX ADMIN — SODIUM CHLORIDE 2000 MILLILITER(S): 9 INJECTION INTRAMUSCULAR; INTRAVENOUS; SUBCUTANEOUS at 12:43

## 2023-05-22 RX ADMIN — FAMOTIDINE 100 MILLIGRAM(S): 10 INJECTION INTRAVENOUS at 11:23

## 2023-05-22 RX ADMIN — Medication 15 MILLIGRAM(S): at 11:42

## 2023-05-22 RX ADMIN — Medication 400 MILLIGRAM(S): at 11:17

## 2023-05-22 NOTE — ED ADULT NURSE NOTE - NSICDXPASTMEDICALHX_GEN_ALL_CORE_FT
PAST MEDICAL HISTORY:  Gastritis     Kidney stone     Urinary tract infection without hematuria, site unspecified frequent reoccuring UTIs as per patient    
Patient interviewed in a private space.

## 2023-05-22 NOTE — ED PROVIDER NOTE - OBJECTIVE STATEMENT
vomiting x 4 since last night, but no diarrhea.   Generalized body aches, headache, and diffuse bl leg pain.  Pt's oldest was not feeling well over the weekend.  lmp- May 1st , 2023.  similar to prior cyclic vomiting syndrome last year.

## 2023-05-22 NOTE — ED PROVIDER NOTE - PROGRESS NOTE DETAILS
All results were explained to patient and/or family and a copy of all available results given.  Pt drank a cup of water s any complications.  pt counseled extensively about diet and recreation drugs.

## 2023-05-22 NOTE — ED PROVIDER NOTE - CARE PROVIDER_API CALL
Larry Larios)  Gastroenterology  [address], [address]  [place] [address]  Phone: [phone]  Fax: [phone]  Follow Up Time: 7-10 Days

## 2023-05-22 NOTE — ED PROVIDER NOTE - PATIENT PORTAL LINK FT
You can access the FollowMyHealth Patient Portal offered by [facility] by registering at the following website: [email or web address]. By joining [employer]’s FollowMyHealth portal, you will also be able to view your health information using other applications (apps) compatible with our system.

## 2023-05-22 NOTE — ED ADULT NURSE NOTE - NSFALLUNIVINTERV_ED_ALL_ED
Bed/Stretcher in lowest position, wheels locked, appropriate side rails in place/Call bell, personal items and telephone in reach/Instruct patient to call for assistance before getting out of bed/chair/stretcher/Non-slip footwear applied when patient is off stretcher/[place] to call system/Physically safe environment - no spills, clutter or unnecessary equipment/Purposeful proactive rounding/Room/bathroom lighting operational, light cord in reach

## 2023-05-22 NOTE — ED PROVIDER NOTE - NSFOLLOWUPINSTRUCTIONS_ED_ALL_ED_FT
What do I need to know about abdominal pain?  Abdominal pain may be felt anywhere between the bottom of your rib cage and your groin. Acute pain usually lasts less than 3 months. Chronic pain lasts longer than 3 months. Your pain may be sharp or dull. The pain may stay in the same place or move around. You may have the pain all the time, or it may come and go. Depending on the cause, you may also have nausea, vomiting, fever, or diarrhea.    Abdominal Organs    What causes abdominal pain?  The cause may not be found. The following are common causes:    Overeating, gas pains, or food poisoning  Constipation or diarrhea  An injury  Appendicitis, a hernia, or an ulcer  Infection or a blockage  A liver, gallbladder, or kidney condition  How is the cause of abdominal pain diagnosed?  Your healthcare provider will check your abdomen. He or she will ask where your pain is and when it started. Tell him or her if the pain wakes you or stops you from doing your daily activities. Describe anything that makes the pain better or worse. You may also need any of the following:    Blood, urine, or bowel movement samples may be tested for signs of an infection, disease, or injury.  X-ray pictures of your abdomen may show an injury or other cause of the pain.  How is abdominal pain treated?  Prescription pain medicine may be given. Ask your healthcare provider how to take this medicine safely. Some prescription pain medicines contain acetaminophen. Do not take other medicines that contain acetaminophen without talking to your healthcare provider. Too much acetaminophen may cause liver damage. Prescription pain medicine may cause constipation. Ask your healthcare provider how to prevent or treat constipation.  Medicines may be given to calm your stomach or prevent vomiting.  Relaxation therapy may be used along with pain medicine.  Surgery may be needed, depending on the cause.  What can I do to manage or prevent abdominal pain?  Apply heat on your abdomen for 20 to 30 minutes every 2 hours for as many days as directed. Heat helps decrease pain and muscle spasms.  Make changes to the foods you eat, if needed. Do not eat foods that cause abdominal pain or other symptoms. Eat small meals more often. The following changes may also help:  Eat more high-fiber foods if you are constipated. High-fiber foods include fruits, vegetables, whole-grain foods, and legumes such as trejo beans.    Do not eat foods that cause gas if you have bloating. Examples include broccoli, cabbage, beans, and carbonated drinks.  Do not eat foods or drinks that contain sorbitol or fructose if you have diarrhea and bloating. Some examples are fruit juices, candy, jelly, and sugar-free gum.  Do not eat high-fat foods. Examples include fried foods, cheeseburgers, hot dogs, and desserts.  Make changes to the liquids you drink, if needed. Do not drink liquids that cause pain or make it worse, such as orange juice. Drink liquids throughout the day to stay hydrated. The following changes may also help:  Drink more liquids to prevent dehydration from diarrhea or vomiting. Ask your healthcare provider how much liquid to drink each day and which liquids are best for you.  Limit or do not have caffeine. Caffeine may make symptoms such as heartburn or nausea worse.  Limit or do not drink alcohol. Alcohol can make your abdominal pain worse. Ask your healthcare provider if it is okay for you to drink alcohol. Also ask how much is okay for you to drink. A drink of alcohol is 12 ounces of beer, ½ ounce of liquor, or 5 ounces of wine.  Keep a diary of your abdominal pain. A diary may help your healthcare provider learn what is causing your pain. Include when the pain happens, how long it lasts, and what the pain feels like. Write down any other symptoms you have with abdominal pain. Also write down what you eat, and any symptoms you have after you eat.  Manage stress. Stress may cause abdominal pain. Your healthcare provider may recommend relaxation techniques and deep breathing exercises to help decrease your stress. Your healthcare provider may recommend you talk to someone about your stress or anxiety, such as a counselor or a friend. Get plenty of sleep. Exercise regularly.  Chemicals in cigarettes can damage your esophagus and stomach. Ask your healthcare provider for information if you currently smoke and need help to quit. E-cigarettes or smokeless tobacco still contain nicotine. Talk to your healthcare provider before you use these products.    Call your local emergency number (911 in the [place]) if:  You have chest pain or shortness of breath.  When should I seek immediate care?  You have pulsing pain in your upper abdomen or lower back that suddenly becomes constant.  Your pain is in the right lower abdominal area and worsens with movement.  You have a fever over 100.4°F (38°C) or shaking chills.  You are vomiting and cannot keep food or liquids down.  Your pain does not improve or gets worse over the next 8 to 12 hours.  You see blood in your vomit or bowel movements, or they look black and tarry.  Your skin or the whites of your eyes turn yellow.  You are a woman and have a large amount of vaginal bleeding that is not your monthly period.  When should I call my doctor?  You have pain in your lower back.  You are a man and have pain in your testicles.  You have pain when you urinate.  You have questions or concerns about your condition or care.  Care Agreement  You have the right to help plan your care. Learn about your health condition and how it may be treated. Discuss treatment options with your healthcare providers to decide what care you want to receive. You always have the right to refuse treatment. The above information is an  only. It is not intended as medical advice for individual conditions or treatments. Talk to your doctor, nurse or pharmacist before following any medical regimen to see if it is safe and effective for you.

## 2023-05-24 NOTE — PATIENT PROFILE OB - PRO PRENATAL LABS ORI SOURCE VDRL/RPR
Informed dr. Rangel about pt's chest pain midsternal radiating to his back. Ekg ordered and nsr. Also informed him of audible crackles/wheezing, and that duoneb was already done, but was not effective. see orders for mucomyst neb tx , solumedrol 125 mg ivp.   
hard copy, drawn during this pregnancy
Zyclara Counseling:  I discussed with the patient the risks of imiquimod including but not limited to erythema, scaling, itching, weeping, crusting, and pain.  Patient understands that the inflammatory response to imiquimod is variable from person to person and was educated regarded proper titration schedule.  If flu-like symptoms develop, patient knows to discontinue the medication and contact us.

## 2023-06-14 NOTE — ED ADULT NURSE NOTE - NSICDXPASTMEDICALHX_GEN_ALL_CORE_FT
yes
PAST MEDICAL HISTORY:  Gastritis     Kidney stone     Urinary tract infection without hematuria, site unspecified frequent reoccuring UTIs as per patient

## 2023-06-22 NOTE — ED ADULT NURSE NOTE - PAIN RATING/NUMBER SCALE (0-10): ACTIVITY
6 Topical Metronidazole Counseling: Metronidazole is a topical antibiotic medication. You may experience burning, stinging, redness, or allergic reactions.  Please call our office if you develop any problems from using this medication.

## 2023-07-10 ENCOUNTER — APPOINTMENT (OUTPATIENT)
Dept: GASTROENTEROLOGY | Facility: CLINIC | Age: 38
End: 2023-07-10

## 2023-08-17 NOTE — ED ADULT NURSE NOTE - ISOLATION TYPE:
None [de-identified] : NECK - tenderness over the cervical paraspinals. ROM restricted. Pain with flexion. Positive spurling bilaterally, mainly on the left.

## 2023-08-19 ENCOUNTER — OFFICE (OUTPATIENT)
Dept: URBAN - METROPOLITAN AREA CLINIC 109 | Facility: CLINIC | Age: 38
Setting detail: OPHTHALMOLOGY
End: 2023-08-19
Payer: COMMERCIAL

## 2023-08-19 DIAGNOSIS — H40.033: ICD-10-CM

## 2023-08-19 PROCEDURE — 92020 GONIOSCOPY: CPT | Performed by: OPHTHALMOLOGY

## 2023-08-19 PROCEDURE — 92133 CPTRZD OPH DX IMG PST SGM ON: CPT | Performed by: OPHTHALMOLOGY

## 2023-08-19 PROCEDURE — 99204 OFFICE O/P NEW MOD 45 MIN: CPT | Performed by: OPHTHALMOLOGY

## 2023-08-19 ASSESSMENT — REFRACTION_CURRENTRX
OD_CYLINDER: -0.50
OS_AXIS: 009
OD_SPHERE: +1.75
OS_VPRISM_DIRECTION: SV
OD_VPRISM_DIRECTION: SV
OD_OVR_VA: 20/
OS_SPHERE: +1.75
OD_AXIS: 169
OS_OVR_VA: 20/
OS_CYLINDER: -0.50

## 2023-08-19 ASSESSMENT — CONFRONTATIONAL VISUAL FIELD TEST (CVF)
OS_FINDINGS: FULL
OD_FINDINGS: FULL

## 2023-08-19 ASSESSMENT — REFRACTION_AUTOREFRACTION
OD_CYLINDER: -0.50
OS_AXIS: 012
OS_SPHERE: +2.00
OD_SPHERE: +1.50
OS_CYLINDER: -0.50
OD_AXIS: 166

## 2023-08-19 ASSESSMENT — VISUAL ACUITY
OD_BCVA: 20/20
OS_BCVA: 20/20

## 2023-08-19 ASSESSMENT — TONOMETRY
OD_IOP_MMHG: 16
OS_IOP_MMHG: 16

## 2023-08-19 ASSESSMENT — SPHEQUIV_DERIVED
OD_SPHEQUIV: 1.25
OS_SPHEQUIV: 1.75

## 2023-09-25 NOTE — ED ADULT NURSE NOTE - CHPI ED NUR TIMING2
9/25/2023    Jessika Gutierrezchuy Cordoba  [address]  [place] [address]    RE: Tc Garcia       Dear Colleague,     I had the pleasure of seeing Tc Garcia in the [facility].  Cardiology Clinic Progress Note  Tc Garcia MRN# [id number]   YOB: 1939 Age: 84 year old   Primary Cardiologist: Dr. Julien Reason for visit: Urgent cardiology follow up            Assessment and Plan:   Tc Garcia is a very pleasant 84 year old male here for urgent cardiology follow up.     1.  Acute on chronic HFpEF - 60-65%, RV moderately dilated with normal RV systolic function   - CHANGE to torsemide 20mg daily  2. Tricuspid regurgitation - moderate to severe   3. Hypertension - elevated  4. CKD  5. Chronic atrial fibrillation - on eliquis for thromboembolic prophylaxis, carvedilol 12.5mg BID  6. DM  7. Anemia     I saw Tc for the first time today for an urgent evaluation due to worsening dyspnea. His weight has been trending down, currently down ~ 10# since July. Despite this he has noted a significant progression of exertional dyspnea over the past few weeks. He dose not appear overtly volume overloaded on exam but his NTproBNP is elevated at 4,488. His furosemide was increased to 40mg with no significant improvement in symptoms. Renal function is stable. Blood pressures remain elevated. Will attempt to change his diuretic regimen to torsemide (equivocal dose) to see if we get better results. Additionally will get further evaluation with hemoglobin and echo.        Changes today: CHANGE furosemide to torsemide 20mg daily    Follow up plan:     Start patient assistance application for Entresto 49/51mg BID, this will replace his irbesartan.     BMP in 1 week    Hemoglobin today    Echocardiogram prior to upcoming OV    Cardiology follow up in 1 month with labs prior    Follow up with PCP regarding weight loss to evaluate other etiology    Depending on results of echo/labs will reach  "out to endcrinology regarding consideration for starting SGLT2i given insulin pump.         History of Presenting Illness:    Tc Garcia is a very pleasant 84 year old male with a history of HFpEF, chronic atrial fibrillation on eliquis, hypertension, HLD, tricuspid regurgitation, CKD, anemia and DM.       Winter 2023 he had multiple hospitalizations, January for nontraumatic intraventricular intracerebral hemorrhage in the setting of hypertension and anticoagulation with apixaban. Apixaban was on hold and has since been resumed with no difficulty. February presented to ED with \"seizure like activity\". April with DKA.     Echo April 2023 showed LVEF 60-65%, RV moderately dilated with normal RV systolic function, moderate to severe tricuspid regurgitation, mild aortic stenosis and mildly dilated ascending aorta.     He last saw Dr. Julien in July 2023 at which point he was doing well, blood pressures were elevated irbesartan increased to 300mg daily.     Patient is here today for urgent cardiology visit due to worsening shortness of breath.     Patient reports feeling good. Monitoring weights daily a home, notes it has been continuing to go down, which particularily was noticeable after increase in furosemide, but states had been losing weight prior to this as well, weight is down 136# at home, clinic weight is 139# which is down from 148# in July. Has had increased shortness of breath particularly with activity, started 2 weeks ago, unable to take the dogs for as long of walk. Has not noticed significant improvement with increased dose of furosemide 40mg. Denies orthopnea or PND. Denies LE edema. Denies chest pain or chest tightness. Denies dizziness, lightheadedness or other presyncopal symptoms. Denies tachycardia or palpitations. Denies episodes of bleeding. Taking medicine daily.     Labs from 9/20 showed stable kidney function, creatinine 1.48, stable electrolytes, NTproBNP up to 4,488. Blood pressure 142/92 " and HR 86 in clinic today. Monitoring blood pressures at home which are elevated, reports 150-160s/70-80s,     Appetite has been okay, notes has been less. Eating most meals at home. Adding additional salt to foods. No set exercise routine. Working with PT for his back, notes his back pain is improving. Rare alcohol use. Denies tobacco use.         Social History    Living at home with his wife, 2 dogs and a cat.   Social History     Socioeconomic History    Marital status:      Spouse name: Lianna    Number of children: 4    Years of education: 16    Highest education level: Not on file   Occupational History    Occupation: [employer]     Employer: [employer]    Tobacco Use    Smoking status: Former     Packs/day: 0.20     Years: 40.00     Pack years: 8.00     Types: Cigarettes     Start date: 1968     Quit date: 1/1/2008     Years since quitting: 15.7    Smokeless tobacco: Never   Vaping Use    Vaping Use: Never used   Substance and Sexual Activity    Alcohol use: Not Currently     Alcohol/week: 35.0 standard drinks of alcohol    Drug use: Not Currently    Sexual activity: Not on file   Other Topics Concern    Parent/sibling w/ CABG, MI or angioplasty before 65F 55M? Not Asked   Social History Narrative    Not on file     Social Determinants of Health     Financial Resource Strain: Not on file   Food Insecurity: Not on file   Transportation Needs: Not on file   Physical Activity: Not on file   Stress: Not on file   Social Connections: Not on file   Interpersonal Safety: Not on file   Housing Stability: Not on file            Review of Systems:   10 point ROS neg other than the symptoms noted above in the HPI.          Physical Exam:   Vitals: There were no vitals taken for this visit.   Wt Readings from Last 4 Encounters:   07/05/23 67.4 kg (148 lb 9.6 oz)   06/07/23 67.1 kg (148 lb)   05/26/23 75.5 kg (166 lb 8 oz)   05/02/23 63.6 kg (140 lb 3.2 oz)     GEN: well nourished, in no acute  distress.  HEENT:  Pupils equal, round. Sclerae nonicteric.   NECK: Supple, no masses appreciated. JVP elevated at 90 degrees  C/V:  Irregularly irregular rate and rhythm  RESP: Respirations are unlabored. Clear to auscultation bilaterally without wheezing, rales, or rhonchi.  GI: Abdomen soft, nontender.  EXTREM: No LE edema.  NEURO: Alert and oriented, cooperative.  SKIN: Warm and dry.        Data:     LIPID RESULTS:  Lab Results   Component Value Date    CHOL 112 01/17/2023    CHOL 116 05/13/2020    HDL 50 01/17/2023    HDL 60 05/13/2020    LDL 52 01/17/2023    LDL 43 05/13/2020    TRIG 51 01/17/2023    TRIG 65 05/13/2020    CHOLHDLRATIO 2.7 03/09/2009     LIVER ENZYME RESULTS:  Lab Results   Component Value Date    AST 16 04/20/2023    AST 12 03/22/2021    ALT 6 (L) 04/20/2023    ALT 5 03/22/2021     CBC RESULTS:  Lab Results   Component Value Date    WBC 7.9 05/01/2023    WBC 10.4 06/15/2021    RBC 4.52 05/01/2023    RBC 4.08 (A) 06/15/2021    HGB 12.4 (L) 05/01/2023    HGB 11.7 (A) 06/15/2021    HCT 38.4 (L) 05/01/2023    HCT 36.3 (A) 06/15/2021    MCV 85 05/01/2023    MCV 89 06/15/2021    MCH 27.4 05/01/2023    MCH 28.7 06/15/2021    MCHC 32.3 05/01/2023    MCHC 32.2 06/15/2021    RDW 14.8 05/01/2023    RDW 14.2 06/15/2021     05/01/2023     06/15/2021     BMP RESULTS:  Lab Results   Component Value Date     09/20/2023     07/12/2021    POTASSIUM 4.4 09/20/2023    POTASSIUM 4.2 01/17/2023    POTASSIUM 4.0 07/12/2021    CHLORIDE 102 09/20/2023    CHLORIDE 105 01/17/2023    CHLORIDE 102 11/04/2021    CHLORIDE 100 07/12/2021    CO2 29 09/20/2023    CO2 25 01/17/2023    CO2 26 07/12/2021    ANIONGAP 8 09/20/2023    ANIONGAP 12 01/17/2023    ANIONGAP 4 03/18/2021     (H) 09/20/2023     (H) 05/02/2023     (H) 01/17/2023     (A) 07/12/2021    BUN 10.7 09/20/2023    BUN 36 (H) 01/17/2023    BUN 18 07/12/2021    BUN 10 07/12/2021    CR 1.48 (H) 09/20/2023    CR  1.73 (A) 07/12/2021    GFRESTIMATED 46 (L) 09/20/2023    GFRESTIMATED 40 (L) 06/21/2021    GFRESTBLACK 46 (L) 06/21/2021    LLOYD 9.1 09/20/2023    LLOYD 9.3 07/12/2021      A1C RESULTS:  Lab Results   Component Value Date    A1C 8.0 (H) 04/20/2023    A1C 7.7 (H) 11/20/2020     INR RESULTS:  Lab Results   Component Value Date    INR 1.13 01/15/2023    INR 1.50 (H) 11/21/2020    INR 1.18 (H) 07/17/2020            Medications     Current Outpatient Medications   Medication Sig Dispense Refill    acetaminophen (TYLENOL) 325 MG tablet Take 2 tablets (650 mg) by mouth every 6 hours as needed for mild pain or fever  0    apixaban ANTICOAGULANT (ELIQUIS) 5 MG tablet Take 1 tablet (5 mg) by mouth 2 times daily 180 tablet 3    carvedilol (COREG) 12.5 MG tablet Take 1 tablet (12.5 mg) by mouth 2 times daily (with meals) 180 tablet 3    Continuous Blood Gluc  (DEXCOM G6 ) JOSE Dexcom G6    USE EACH WITH 10 DAYS SENSORS      finasteride (PROSCAR) 5 MG tablet Take 1 tablet (5 mg) by mouth daily  0    furosemide (LASIX) 20 MG tablet Take 1 tablet (20 mg) by mouth daily 90 tablet 1    glucagon 1 MG kit 1 mg as needed for low blood sugar      HUMALOG 100 UNIT/ML injection For refilling insulin pump      HYDROcodone-acetaminophen (NORCO) 5-325 MG tablet TAKE 1/2-1 TABLET BY MOUTH AS NEEDED FOR PAIN. MAX OF 1 TABLET PER DAY      INSULIN PUMP - OUTPATIENT Medtronic device with no CGM and site change every 3 days   Sensitivity factor (midnight to midnight): 55  Bolus (units:gram): 7358-8894 = 1:9, 5203-6905 = 1:7, 2259-7408 = 1:7, 8321-6562 = 1:9, 2899-7197 = 1:13  Basal (units/hour): 5754-9803 = 0.45, 1889-6664 = 0.5, 5853-0225 = 0.625, 2231-0767 = 0.6, 0498-1560 = 0.45      irbesartan (AVAPRO) 300 MG tablet Take 1 tablet (300 mg) by mouth At Bedtime 90 tablet 3    potassium chloride ER (K-TAB/KLOR-CON) 10 MEQ CR tablet Take 1 tablet (10 mEq) by mouth daily 90 tablet 3    tamsulosin (FLOMAX) 0.4 MG capsule Take 0.4  mg by mouth every morning          Past Medical History     Past Medical History:   Diagnosis Date    Anemia     Anemia of chronic disease 5/14/2020    Back pain     CKD (chronic kidney disease) stage 3, GFR 30-59 ml/min (H)     CRF (chronic renal failure), stage 3  5/14/2020    Fall 11/2019    suffered multiple left rib fractures, C3 and T2 fractures    Mixed hyperlipidemia 7/7/2004    Paroxysmal atrial fibrillation (H)     Personal history of colonic polyps 1972    gets colonoscopy every five years, due in 2006    Pleural effusion on left 11/2019    after multiple rib fractures    Pulmonary hypertension (H) 5/10/2020    Added automatically from request for surgery [id number]    Recurrent Left Pleural effusion -- S/P Pleurex Cath 5/12/20 12/30/2019    Rosacea 1989    Type II or unspecified type diabetes mellitus without mention of complication, not stated as uncontrolled 1999    Unspecified essential hypertension 1994     Past Surgical History:   Procedure Laterality Date    ANESTHESIA CARDIOVERSION N/A 2/3/2020    Procedure: ANESTHESIA, FOR CARDIOVERSION;  Surgeon: GENERIC ANESTHESIA PROVIDER;  Location:  OR     RIGHT HEART CATH MEASUREMENTS RECORDED N/A 5/13/2020    Procedure: Right Heart Cath;  Surgeon: Senthil Silva MD;  Location:  HEART CARDIAC CATH LAB    HC REMOVE TONSILS/ADENOIDS,<13 Y/O      T & A <12y.o.    IR CHEST TUBE DRAIN TUNNELED LEFT  5/12/2020    IR CHEST TUBE PLACEMENT NON-TUNNELLED LEFT  7/11/2020    IR CHEST TUBE REMOVAL NON TUNNELED LEFT  7/17/2020    IR CHEST TUBE REMOVAL TUNNELED LEFT  7/11/2020    LAPAROSCOPIC CHOLECYSTECTOMY N/A 11/22/2020    Procedure: CHOLECYSTECTOMY, LAPAROSCOPIC;  Surgeon: Annette Lambert MD;  Location:  OR    LAPAROSCOPIC HERNIORRHAPHY INGUINAL BILATERAL Bilateral 10/27/2020    Procedure: LAPAROSCOPIC BILATERAL INGUINAL HERNIA REPAIR WITH MESH;  Surgeon: Brina Garsia MD;  Location:  OR     Family History   Problem Relation Age of Onset     Family History Negative Mother          age 71    Family History Negative Father          age 70    Diabetes Brother         alive age 77    Diabetes Brother         alive age 76    Family History Negative Brother             Family History Negative Brother             Diabetes Sister         alive age74    Family History Negative Sister          age 86    Heart Disease Sister             Family History Negative Sister             Family History Negative Sister             Diabetes Sister     Diabetes Sister     Diabetes Brother     Diabetes Brother             Allergies   No known drug allergy    40 minutes spent on the date of the encounter doing chart review, history and exam, documentation and further activities as noted above      OMAR Murrieta CNP  [facility]  Pager: [phone]      Thank you for allowing me to participate in the care of your patient.      Sincerely,     OMAR Murrieta CNP     [facility]  cc:   Cony Julien DO  [address]  [place] [address]       sudden onset

## 2023-09-29 ENCOUNTER — NON-APPOINTMENT (OUTPATIENT)
Age: 38
End: 2023-09-29

## 2023-10-02 ENCOUNTER — EMERGENCY (EMERGENCY)
Facility: HOSPITAL | Age: 38
LOS: 1 days | Discharge: HOME | End: 2023-10-02
Attending: EMERGENCY MEDICINE | Admitting: EMERGENCY MEDICINE
Payer: COMMERCIAL

## 2023-10-02 VITALS
DIASTOLIC BLOOD PRESSURE: 94 MMHG | HEART RATE: 77 BPM | HEIGHT: 66 IN | RESPIRATION RATE: 19 BRPM | SYSTOLIC BLOOD PRESSURE: 130 MMHG | OXYGEN SATURATION: 98 % | WEIGHT: 139.99 LBS | TEMPERATURE: 98 F

## 2023-10-02 DIAGNOSIS — Z98.891 HISTORY OF UTERINE SCAR FROM PREVIOUS SURGERY: Chronic | ICD-10-CM

## 2023-10-02 PROCEDURE — 99284 EMERGENCY DEPT VISIT MOD MDM: CPT

## 2023-10-02 PROCEDURE — 93010 ELECTROCARDIOGRAM REPORT: CPT

## 2023-10-02 RX ORDER — SODIUM CHLORIDE 9 MG/ML
1000 INJECTION INTRAMUSCULAR; INTRAVENOUS; SUBCUTANEOUS ONCE
Refills: 0 | Status: COMPLETED | OUTPATIENT
Start: 2023-10-02 | End: 2023-10-02

## 2023-10-02 RX ORDER — ONDANSETRON 8 MG/1
4 TABLET, FILM COATED ORAL ONCE
Refills: 0 | Status: COMPLETED | OUTPATIENT
Start: 2023-10-02 | End: 2023-10-03

## 2023-10-02 RX ADMIN — Medication 1 MILLIGRAM(S): at 23:59

## 2023-10-02 NOTE — ED ADULT NURSE NOTE - NSFALLUNIVINTERV_ED_ALL_ED
Bed/Stretcher in lowest position, wheels locked, appropriate side rails in place/Call bell, personal items and telephone in reach/Instruct patient to call for assistance before getting out of bed/chair/stretcher/Non-slip footwear applied when patient is off stretcher/[place] to call system/Physically safe environment - no spills, clutter or unnecessary equipment/Purposeful proactive rounding/Room/bathroom lighting operational, light cord in reach

## 2023-10-02 NOTE — ED ADULT NURSE NOTE - OBJECTIVE STATEMENT
Pt presents to the ER complaining of shortness of breath with chest discomfort, nausea and vomiting. Pt states she feels very anxious and it started on Saturday and has gotten progressively worse today. A&OX4. Pt denies chest pain, fever, urinary symptoms, dizziness or headache.

## 2023-10-03 ENCOUNTER — EMERGENCY (EMERGENCY)
Facility: HOSPITAL | Age: 38
LOS: 1 days | Discharge: HOME | End: 2023-10-03
Attending: INTERNAL MEDICINE | Admitting: INTERNAL MEDICINE
Payer: COMMERCIAL

## 2023-10-03 VITALS
WEIGHT: 139.99 LBS | TEMPERATURE: 98 F | OXYGEN SATURATION: 100 % | HEIGHT: 66 IN | HEART RATE: 90 BPM | RESPIRATION RATE: 18 BRPM | DIASTOLIC BLOOD PRESSURE: 86 MMHG | SYSTOLIC BLOOD PRESSURE: 128 MMHG

## 2023-10-03 VITALS
RESPIRATION RATE: 18 BRPM | HEART RATE: 70 BPM | SYSTOLIC BLOOD PRESSURE: 128 MMHG | OXYGEN SATURATION: 99 % | DIASTOLIC BLOOD PRESSURE: 78 MMHG

## 2023-10-03 DIAGNOSIS — Z98.891 HISTORY OF UTERINE SCAR FROM PREVIOUS SURGERY: Chronic | ICD-10-CM

## 2023-10-03 LAB
ALBUMIN SERPL ELPH-MCNC: 3.8 G/DL — SIGNIFICANT CHANGE UP (ref 3.3–5)
ALBUMIN SERPL ELPH-MCNC: 4 G/DL — SIGNIFICANT CHANGE UP (ref 3.3–5)
ALP SERPL-CCNC: 79 U/L — SIGNIFICANT CHANGE UP (ref 40–120)
ALP SERPL-CCNC: 81 U/L — SIGNIFICANT CHANGE UP (ref 40–120)
ALT FLD-CCNC: 13 U/L — SIGNIFICANT CHANGE UP (ref 10–45)
ALT FLD-CCNC: 18 U/L — SIGNIFICANT CHANGE UP (ref 10–45)
ANION GAP SERPL CALC-SCNC: 10 MMOL/L — SIGNIFICANT CHANGE UP (ref 5–17)
ANION GAP SERPL CALC-SCNC: 16 MMOL/L — SIGNIFICANT CHANGE UP (ref 5–17)
AST SERPL-CCNC: 16 U/L — SIGNIFICANT CHANGE UP (ref 10–40)
AST SERPL-CCNC: 24 U/L — SIGNIFICANT CHANGE UP (ref 10–40)
BASOPHILS # BLD AUTO: 0.03 K/UL — SIGNIFICANT CHANGE UP (ref 0–0.2)
BASOPHILS # BLD AUTO: 0.05 K/UL — SIGNIFICANT CHANGE UP (ref 0–0.2)
BASOPHILS NFR BLD AUTO: 0.2 % — SIGNIFICANT CHANGE UP (ref 0–2)
BASOPHILS NFR BLD AUTO: 0.5 % — SIGNIFICANT CHANGE UP (ref 0–2)
BILIRUB SERPL-MCNC: 0.2 MG/DL — SIGNIFICANT CHANGE UP (ref 0.2–1.2)
BILIRUB SERPL-MCNC: 0.4 MG/DL — SIGNIFICANT CHANGE UP (ref 0.2–1.2)
BUN SERPL-MCNC: 10 MG/DL — SIGNIFICANT CHANGE UP (ref 7–23)
BUN SERPL-MCNC: 6 MG/DL — LOW (ref 7–23)
CALCIUM SERPL-MCNC: 8.7 MG/DL — SIGNIFICANT CHANGE UP (ref 8.4–10.5)
CALCIUM SERPL-MCNC: 8.9 MG/DL — SIGNIFICANT CHANGE UP (ref 8.4–10.5)
CHLORIDE SERPL-SCNC: 103 MMOL/L — SIGNIFICANT CHANGE UP (ref 96–108)
CHLORIDE SERPL-SCNC: 98 MMOL/L — SIGNIFICANT CHANGE UP (ref 96–108)
CO2 SERPL-SCNC: 18 MMOL/L — LOW (ref 22–31)
CO2 SERPL-SCNC: 24 MMOL/L — SIGNIFICANT CHANGE UP (ref 22–31)
CREAT SERPL-MCNC: 0.57 MG/DL — SIGNIFICANT CHANGE UP (ref 0.5–1.3)
CREAT SERPL-MCNC: 0.67 MG/DL — SIGNIFICANT CHANGE UP (ref 0.5–1.3)
EGFR: 115 ML/MIN/1.73M2 — SIGNIFICANT CHANGE UP
EGFR: 119 ML/MIN/1.73M2 — SIGNIFICANT CHANGE UP
EOSINOPHIL # BLD AUTO: 0 K/UL — SIGNIFICANT CHANGE UP (ref 0–0.5)
EOSINOPHIL # BLD AUTO: 0.08 K/UL — SIGNIFICANT CHANGE UP (ref 0–0.5)
EOSINOPHIL NFR BLD AUTO: 0 % — SIGNIFICANT CHANGE UP (ref 0–6)
EOSINOPHIL NFR BLD AUTO: 0.8 % — SIGNIFICANT CHANGE UP (ref 0–6)
GLUCOSE SERPL-MCNC: 102 MG/DL — HIGH (ref 70–99)
GLUCOSE SERPL-MCNC: 111 MG/DL — HIGH (ref 70–99)
HCT VFR BLD CALC: 39.9 % — SIGNIFICANT CHANGE UP (ref 34.5–45)
HCT VFR BLD CALC: 40.4 % — SIGNIFICANT CHANGE UP (ref 34.5–45)
HGB BLD-MCNC: 12.7 G/DL — SIGNIFICANT CHANGE UP (ref 11.5–15.5)
HGB BLD-MCNC: 12.8 G/DL — SIGNIFICANT CHANGE UP (ref 11.5–15.5)
IMM GRANULOCYTES NFR BLD AUTO: 0.2 % — SIGNIFICANT CHANGE UP (ref 0–0.9)
IMM GRANULOCYTES NFR BLD AUTO: 0.5 % — SIGNIFICANT CHANGE UP (ref 0–0.9)
LIDOCAIN IGE QN: 23 U/L — SIGNIFICANT CHANGE UP (ref 16–77)
LIDOCAIN IGE QN: 40 U/L — SIGNIFICANT CHANGE UP (ref 16–77)
LYMPHOCYTES # BLD AUTO: 0.86 K/UL — LOW (ref 1–3.3)
LYMPHOCYTES # BLD AUTO: 3.16 K/UL — SIGNIFICANT CHANGE UP (ref 1–3.3)
LYMPHOCYTES # BLD AUTO: 32.3 % — SIGNIFICANT CHANGE UP (ref 13–44)
LYMPHOCYTES # BLD AUTO: 5.8 % — LOW (ref 13–44)
MCHC RBC-ENTMCNC: 27.5 PG — SIGNIFICANT CHANGE UP (ref 27–34)
MCHC RBC-ENTMCNC: 27.8 PG — SIGNIFICANT CHANGE UP (ref 27–34)
MCHC RBC-ENTMCNC: 31.4 GM/DL — LOW (ref 32–36)
MCHC RBC-ENTMCNC: 32.1 GM/DL — SIGNIFICANT CHANGE UP (ref 32–36)
MCV RBC AUTO: 86.6 FL — SIGNIFICANT CHANGE UP (ref 80–100)
MCV RBC AUTO: 87.4 FL — SIGNIFICANT CHANGE UP (ref 80–100)
MONOCYTES # BLD AUTO: 0.19 K/UL — SIGNIFICANT CHANGE UP (ref 0–0.9)
MONOCYTES # BLD AUTO: 0.75 K/UL — SIGNIFICANT CHANGE UP (ref 0–0.9)
MONOCYTES NFR BLD AUTO: 1.3 % — LOW (ref 2–14)
MONOCYTES NFR BLD AUTO: 7.7 % — SIGNIFICANT CHANGE UP (ref 2–14)
NEUTROPHILS # BLD AUTO: 13.68 K/UL — HIGH (ref 1.8–7.4)
NEUTROPHILS # BLD AUTO: 5.73 K/UL — SIGNIFICANT CHANGE UP (ref 1.8–7.4)
NEUTROPHILS NFR BLD AUTO: 58.5 % — SIGNIFICANT CHANGE UP (ref 43–77)
NEUTROPHILS NFR BLD AUTO: 92.2 % — HIGH (ref 43–77)
NRBC # BLD: 0 /100 WBCS — SIGNIFICANT CHANGE UP (ref 0–0)
NRBC # BLD: 0 /100 WBCS — SIGNIFICANT CHANGE UP (ref 0–0)
PLATELET # BLD AUTO: 424 K/UL — HIGH (ref 150–400)
PLATELET # BLD AUTO: 457 K/UL — HIGH (ref 150–400)
POTASSIUM SERPL-MCNC: 3.8 MMOL/L — SIGNIFICANT CHANGE UP (ref 3.5–5.3)
POTASSIUM SERPL-MCNC: 4.6 MMOL/L — SIGNIFICANT CHANGE UP (ref 3.5–5.3)
POTASSIUM SERPL-SCNC: 3.8 MMOL/L — SIGNIFICANT CHANGE UP (ref 3.5–5.3)
POTASSIUM SERPL-SCNC: 4.6 MMOL/L — SIGNIFICANT CHANGE UP (ref 3.5–5.3)
PROT SERPL-MCNC: 7.7 G/DL — SIGNIFICANT CHANGE UP (ref 6–8.3)
PROT SERPL-MCNC: 8.5 G/DL — HIGH (ref 6–8.3)
RBC # BLD: 4.61 M/UL — SIGNIFICANT CHANGE UP (ref 3.8–5.2)
RBC # BLD: 4.62 M/UL — SIGNIFICANT CHANGE UP (ref 3.8–5.2)
RBC # FLD: 14.1 % — SIGNIFICANT CHANGE UP (ref 10.3–14.5)
RBC # FLD: 14.3 % — SIGNIFICANT CHANGE UP (ref 10.3–14.5)
SODIUM SERPL-SCNC: 132 MMOL/L — LOW (ref 135–145)
SODIUM SERPL-SCNC: 137 MMOL/L — SIGNIFICANT CHANGE UP (ref 135–145)
WBC # BLD: 14.84 K/UL — HIGH (ref 3.8–10.5)
WBC # BLD: 9.79 K/UL — SIGNIFICANT CHANGE UP (ref 3.8–10.5)
WBC # FLD AUTO: 14.84 K/UL — HIGH (ref 3.8–10.5)
WBC # FLD AUTO: 9.79 K/UL — SIGNIFICANT CHANGE UP (ref 3.8–10.5)

## 2023-10-03 PROCEDURE — 83690 ASSAY OF LIPASE: CPT

## 2023-10-03 PROCEDURE — 80053 COMPREHEN METABOLIC PANEL: CPT

## 2023-10-03 PROCEDURE — 99284 EMERGENCY DEPT VISIT MOD MDM: CPT | Mod: 25

## 2023-10-03 PROCEDURE — 85025 COMPLETE CBC W/AUTO DIFF WBC: CPT

## 2023-10-03 PROCEDURE — 96361 HYDRATE IV INFUSION ADD-ON: CPT

## 2023-10-03 PROCEDURE — 96372 THER/PROPH/DIAG INJ SC/IM: CPT | Mod: XU

## 2023-10-03 PROCEDURE — 93005 ELECTROCARDIOGRAM TRACING: CPT

## 2023-10-03 PROCEDURE — 81025 URINE PREGNANCY TEST: CPT

## 2023-10-03 PROCEDURE — 99284 EMERGENCY DEPT VISIT MOD MDM: CPT

## 2023-10-03 PROCEDURE — 96374 THER/PROPH/DIAG INJ IV PUSH: CPT

## 2023-10-03 PROCEDURE — 96376 TX/PRO/DX INJ SAME DRUG ADON: CPT

## 2023-10-03 PROCEDURE — 96375 TX/PRO/DX INJ NEW DRUG ADDON: CPT

## 2023-10-03 PROCEDURE — 36415 COLL VENOUS BLD VENIPUNCTURE: CPT

## 2023-10-03 RX ORDER — PROCHLORPERAZINE MALEATE 5 MG
10 TABLET ORAL ONCE
Refills: 0 | Status: COMPLETED | OUTPATIENT
Start: 2023-10-03 | End: 2023-10-03

## 2023-10-03 RX ORDER — SODIUM CHLORIDE 9 MG/ML
1950 INJECTION INTRAMUSCULAR; INTRAVENOUS; SUBCUTANEOUS ONCE
Refills: 0 | Status: COMPLETED | OUTPATIENT
Start: 2023-10-03 | End: 2023-10-03

## 2023-10-03 RX ORDER — ONDANSETRON 8 MG/1
4 TABLET, FILM COATED ORAL ONCE
Refills: 0 | Status: COMPLETED | OUTPATIENT
Start: 2023-10-03 | End: 2023-10-03

## 2023-10-03 RX ORDER — ONDANSETRON 8 MG/1
1 TABLET, FILM COATED ORAL
Qty: 3 | Refills: 0
Start: 2023-10-03 | End: 2023-10-12

## 2023-10-03 RX ORDER — ONDANSETRON 8 MG/1
1 TABLET, FILM COATED ORAL
Qty: 9 | Refills: 0
Start: 2023-10-03 | End: 2023-10-05

## 2023-10-03 RX ORDER — DIPHENHYDRAMINE HCL 50 MG
25 CAPSULE ORAL ONCE
Refills: 0 | Status: COMPLETED | OUTPATIENT
Start: 2023-10-03 | End: 2023-10-03

## 2023-10-03 RX ADMIN — ONDANSETRON 4 MILLIGRAM(S): 8 TABLET, FILM COATED ORAL at 00:01

## 2023-10-03 RX ADMIN — Medication 2 MILLIGRAM(S): at 12:32

## 2023-10-03 RX ADMIN — SODIUM CHLORIDE 1950 MILLILITER(S): 9 INJECTION INTRAMUSCULAR; INTRAVENOUS; SUBCUTANEOUS at 12:44

## 2023-10-03 RX ADMIN — SODIUM CHLORIDE 1950 MILLILITER(S): 9 INJECTION INTRAMUSCULAR; INTRAVENOUS; SUBCUTANEOUS at 13:44

## 2023-10-03 RX ADMIN — Medication 10 MILLIGRAM(S): at 02:44

## 2023-10-03 RX ADMIN — Medication 25 MILLIGRAM(S): at 12:32

## 2023-10-03 RX ADMIN — SODIUM CHLORIDE 1000 MILLILITER(S): 9 INJECTION INTRAMUSCULAR; INTRAVENOUS; SUBCUTANEOUS at 00:01

## 2023-10-03 RX ADMIN — ONDANSETRON 4 MILLIGRAM(S): 8 TABLET, FILM COATED ORAL at 12:32

## 2023-10-03 RX ADMIN — SODIUM CHLORIDE 1000 MILLILITER(S): 9 INJECTION INTRAMUSCULAR; INTRAVENOUS; SUBCUTANEOUS at 01:02

## 2023-10-03 RX ADMIN — ONDANSETRON 4 MILLIGRAM(S): 8 TABLET, FILM COATED ORAL at 01:17

## 2023-10-03 NOTE — ED PROVIDER NOTE - NSFOLLOWUPINSTRUCTIONS_ED_ALL_ED_FT
Follow up with your PMD within 1-2 days.  Rest, increase your fluids, advance your activity as tolerated.   Take all of your other medications as previously prescribed.   Worsening, continued or ANY new concerning symptoms return to the emergency department.  Zofran 4 mg as needed every 8h

## 2023-10-03 NOTE — ED ADULT NURSE REASSESSMENT NOTE - NS ED NURSE REASSESS COMMENT FT1
Pt. got nauseated upon discharge and IV removal. MD notified. IM medication route ordered for treatment. PCA Abigail at bedside with writer RN for medication administration. Safety maintained. Will continue to monitor.

## 2023-10-03 NOTE — ED PROVIDER NOTE - PATIENT PORTAL LINK FT
You can access the FollowMyHealth Patient Portal offered by [facility] by registering at the following website: [email or web address]. By joining [employer]’s FollowMyHealth portal, you will also be able to view your health information using other applications (apps) compatible with our system.

## 2023-10-03 NOTE — ED ADULT NURSE NOTE - OBJECTIVE STATEMENT
Patient came from home with complaint of vomiting since last night and anxiety. Patient denies any abdominal pain or diarrhea.

## 2023-10-03 NOTE — ED PROVIDER NOTE - OBJECTIVE STATEMENT
38-year-old female history of cyclic vomiting syndrome came to the emergency room chief complaint of anxiety nausea vomiting unable to tolerate p.o. and abdominal cramps patient stated that she got Compazine yesterday which is making her feel more anxious patient to 25 mg of Benadryl at homeWith no relief

## 2023-10-03 NOTE — ED PROVIDER NOTE - NSFOLLOWUPINSTRUCTIONS_ED_ALL_ED_FT
-- You should update your primary care physician on your Emergency Department visit and follow up with them.  If you do not have a physician or have difficulty following up, please call: [phone] to obtain a [facility] doctor or specialist who can provide follow up.    -- Return to the ER for worsening or persistent symptoms, and/or ANY NEW OR CONCERNING SYMPTOMS.

## 2023-10-03 NOTE — ED PROVIDER NOTE - CLINICAL SUMMARY MEDICAL DECISION MAKING FREE TEXT BOX
38-year-old female history of cyclic vomiting syndrome came to the emergency room chief complaint of anxiety nausea vomiting unable to tolerate p.o. and abdominal cramps patient stated that she got Compazine yesterday which is making her feel more anxious patient to 25 mg of Benadryl at homeWith no relief  Patient treated with 25-30 and Zofran 4 mg and patient feels much better tolerating p.o.

## 2023-10-03 NOTE — ED PROVIDER NOTE - OBJECTIVE STATEMENT
pt states that she suffers from anxiety and cyclical vomiting and today she started to have chest burning and feeling like she was getting a panic attack and sometimes that follows with multiple episodes of vomiting. comes to ER early to prevent these severe sx. pt does not take any regular medications. has no other medical problems.

## 2023-10-07 NOTE — ED PROVIDER NOTE - CROS ED RESP ALL NEG
negative... PAST SURGICAL HISTORY:  H/O adenoidectomy     History of ear surgery     S/P middle ear reconstruction

## 2023-10-31 NOTE — ED PROVIDER NOTE - PROVIDER TOKENS
PROVIDER:[TOKEN:[[id number]]] Minocycline Counseling: Patient advised regarding possible photosensitivity and discoloration of the teeth, skin, lips, tongue and gums.  Patient instructed to avoid sunlight, if possible.  When exposed to sunlight, patients should wear protective clothing, sunglasses, and sunscreen.  The patient was instructed to call the office immediately if the following severe adverse effects occur:  hearing changes, easy bruising/bleeding, severe headache, or vision changes.  The patient verbalized understanding of the proper use and possible adverse effects of minocycline.  All of the patient's questions and concerns were addressed.

## 2023-11-22 NOTE — ED ADULT TRIAGE NOTE - ISOLATION TYPE:
Otolaryngologist Procedure Text (A): After obtaining clear surgical margins the patient was sent to otolaryngology for surgical repair.  The patient understands they will receive post-surgical care and follow-up from the referring physician's office. None

## 2023-12-18 ENCOUNTER — EMERGENCY (EMERGENCY)
Facility: HOSPITAL | Age: 38
LOS: 1 days | Discharge: HOME | End: 2023-12-18
Attending: STUDENT IN AN ORGANIZED HEALTH CARE EDUCATION/TRAINING PROGRAM | Admitting: EMERGENCY MEDICINE
Payer: COMMERCIAL

## 2023-12-18 VITALS
DIASTOLIC BLOOD PRESSURE: 74 MMHG | SYSTOLIC BLOOD PRESSURE: 122 MMHG | OXYGEN SATURATION: 99 % | HEART RATE: 84 BPM | RESPIRATION RATE: 18 BRPM

## 2023-12-18 VITALS
RESPIRATION RATE: 18 BRPM | WEIGHT: 136.69 LBS | HEART RATE: 99 BPM | DIASTOLIC BLOOD PRESSURE: 76 MMHG | SYSTOLIC BLOOD PRESSURE: 120 MMHG | TEMPERATURE: 97 F | OXYGEN SATURATION: 100 %

## 2023-12-18 DIAGNOSIS — Z98.891 HISTORY OF UTERINE SCAR FROM PREVIOUS SURGERY: Chronic | ICD-10-CM

## 2023-12-18 LAB
ALBUMIN SERPL ELPH-MCNC: 4 G/DL — SIGNIFICANT CHANGE UP (ref 3.3–5)
ALBUMIN SERPL ELPH-MCNC: 4 G/DL — SIGNIFICANT CHANGE UP (ref 3.3–5)
ALP SERPL-CCNC: 79 U/L — SIGNIFICANT CHANGE UP (ref 40–120)
ALP SERPL-CCNC: 79 U/L — SIGNIFICANT CHANGE UP (ref 40–120)
ALT FLD-CCNC: 13 U/L — SIGNIFICANT CHANGE UP (ref 10–45)
ALT FLD-CCNC: 13 U/L — SIGNIFICANT CHANGE UP (ref 10–45)
ANION GAP SERPL CALC-SCNC: 7 MMOL/L — SIGNIFICANT CHANGE UP (ref 5–17)
ANION GAP SERPL CALC-SCNC: 7 MMOL/L — SIGNIFICANT CHANGE UP (ref 5–17)
AST SERPL-CCNC: 15 U/L — SIGNIFICANT CHANGE UP (ref 10–40)
AST SERPL-CCNC: 15 U/L — SIGNIFICANT CHANGE UP (ref 10–40)
BASOPHILS # BLD AUTO: 0.02 K/UL — SIGNIFICANT CHANGE UP (ref 0–0.2)
BASOPHILS # BLD AUTO: 0.02 K/UL — SIGNIFICANT CHANGE UP (ref 0–0.2)
BASOPHILS NFR BLD AUTO: 0.2 % — SIGNIFICANT CHANGE UP (ref 0–2)
BASOPHILS NFR BLD AUTO: 0.2 % — SIGNIFICANT CHANGE UP (ref 0–2)
BILIRUB SERPL-MCNC: 0.6 MG/DL — SIGNIFICANT CHANGE UP (ref 0.2–1.2)
BILIRUB SERPL-MCNC: 0.6 MG/DL — SIGNIFICANT CHANGE UP (ref 0.2–1.2)
BUN SERPL-MCNC: 8 MG/DL — SIGNIFICANT CHANGE UP (ref 7–23)
BUN SERPL-MCNC: 8 MG/DL — SIGNIFICANT CHANGE UP (ref 7–23)
CALCIUM SERPL-MCNC: 8.9 MG/DL — SIGNIFICANT CHANGE UP (ref 8.4–10.5)
CALCIUM SERPL-MCNC: 8.9 MG/DL — SIGNIFICANT CHANGE UP (ref 8.4–10.5)
CHLORIDE SERPL-SCNC: 103 MMOL/L — SIGNIFICANT CHANGE UP (ref 96–108)
CHLORIDE SERPL-SCNC: 103 MMOL/L — SIGNIFICANT CHANGE UP (ref 96–108)
CO2 SERPL-SCNC: 29 MMOL/L — SIGNIFICANT CHANGE UP (ref 22–31)
CO2 SERPL-SCNC: 29 MMOL/L — SIGNIFICANT CHANGE UP (ref 22–31)
CREAT SERPL-MCNC: 0.68 MG/DL — SIGNIFICANT CHANGE UP (ref 0.5–1.3)
CREAT SERPL-MCNC: 0.68 MG/DL — SIGNIFICANT CHANGE UP (ref 0.5–1.3)
EGFR: 114 ML/MIN/1.73M2 — SIGNIFICANT CHANGE UP
EGFR: 114 ML/MIN/1.73M2 — SIGNIFICANT CHANGE UP
EOSINOPHIL # BLD AUTO: 0.03 K/UL — SIGNIFICANT CHANGE UP (ref 0–0.5)
EOSINOPHIL # BLD AUTO: 0.03 K/UL — SIGNIFICANT CHANGE UP (ref 0–0.5)
EOSINOPHIL NFR BLD AUTO: 0.3 % — SIGNIFICANT CHANGE UP (ref 0–6)
EOSINOPHIL NFR BLD AUTO: 0.3 % — SIGNIFICANT CHANGE UP (ref 0–6)
GLUCOSE SERPL-MCNC: 109 MG/DL — HIGH (ref 70–99)
GLUCOSE SERPL-MCNC: 109 MG/DL — HIGH (ref 70–99)
HCG SERPL-ACNC: <1 MIU/ML — SIGNIFICANT CHANGE UP
HCG SERPL-ACNC: <1 MIU/ML — SIGNIFICANT CHANGE UP
HCT VFR BLD CALC: 41.2 % — SIGNIFICANT CHANGE UP (ref 34.5–45)
HCT VFR BLD CALC: 41.2 % — SIGNIFICANT CHANGE UP (ref 34.5–45)
HGB BLD-MCNC: 13.4 G/DL — SIGNIFICANT CHANGE UP (ref 11.5–15.5)
HGB BLD-MCNC: 13.4 G/DL — SIGNIFICANT CHANGE UP (ref 11.5–15.5)
IMM GRANULOCYTES NFR BLD AUTO: 0.3 % — SIGNIFICANT CHANGE UP (ref 0–0.9)
IMM GRANULOCYTES NFR BLD AUTO: 0.3 % — SIGNIFICANT CHANGE UP (ref 0–0.9)
LIDOCAIN IGE QN: 34 U/L — SIGNIFICANT CHANGE UP (ref 16–77)
LIDOCAIN IGE QN: 34 U/L — SIGNIFICANT CHANGE UP (ref 16–77)
LYMPHOCYTES # BLD AUTO: 1.54 K/UL — SIGNIFICANT CHANGE UP (ref 1–3.3)
LYMPHOCYTES # BLD AUTO: 1.54 K/UL — SIGNIFICANT CHANGE UP (ref 1–3.3)
LYMPHOCYTES # BLD AUTO: 13 % — SIGNIFICANT CHANGE UP (ref 13–44)
LYMPHOCYTES # BLD AUTO: 13 % — SIGNIFICANT CHANGE UP (ref 13–44)
MAGNESIUM SERPL-MCNC: 1.8 MG/DL — SIGNIFICANT CHANGE UP (ref 1.6–2.6)
MAGNESIUM SERPL-MCNC: 1.8 MG/DL — SIGNIFICANT CHANGE UP (ref 1.6–2.6)
MCHC RBC-ENTMCNC: 28.1 PG — SIGNIFICANT CHANGE UP (ref 27–34)
MCHC RBC-ENTMCNC: 28.1 PG — SIGNIFICANT CHANGE UP (ref 27–34)
MCHC RBC-ENTMCNC: 32.5 GM/DL — SIGNIFICANT CHANGE UP (ref 32–36)
MCHC RBC-ENTMCNC: 32.5 GM/DL — SIGNIFICANT CHANGE UP (ref 32–36)
MCV RBC AUTO: 86.4 FL — SIGNIFICANT CHANGE UP (ref 80–100)
MCV RBC AUTO: 86.4 FL — SIGNIFICANT CHANGE UP (ref 80–100)
MONOCYTES # BLD AUTO: 0.45 K/UL — SIGNIFICANT CHANGE UP (ref 0–0.9)
MONOCYTES # BLD AUTO: 0.45 K/UL — SIGNIFICANT CHANGE UP (ref 0–0.9)
MONOCYTES NFR BLD AUTO: 3.8 % — SIGNIFICANT CHANGE UP (ref 2–14)
MONOCYTES NFR BLD AUTO: 3.8 % — SIGNIFICANT CHANGE UP (ref 2–14)
NEUTROPHILS # BLD AUTO: 9.81 K/UL — HIGH (ref 1.8–7.4)
NEUTROPHILS # BLD AUTO: 9.81 K/UL — HIGH (ref 1.8–7.4)
NEUTROPHILS NFR BLD AUTO: 82.4 % — HIGH (ref 43–77)
NEUTROPHILS NFR BLD AUTO: 82.4 % — HIGH (ref 43–77)
NRBC # BLD: 0 /100 WBCS — SIGNIFICANT CHANGE UP (ref 0–0)
NRBC # BLD: 0 /100 WBCS — SIGNIFICANT CHANGE UP (ref 0–0)
PHOSPHATE SERPL-MCNC: 2.1 MG/DL — LOW (ref 2.5–4.5)
PHOSPHATE SERPL-MCNC: 2.1 MG/DL — LOW (ref 2.5–4.5)
PLATELET # BLD AUTO: 451 K/UL — HIGH (ref 150–400)
PLATELET # BLD AUTO: 451 K/UL — HIGH (ref 150–400)
POTASSIUM SERPL-MCNC: 4.1 MMOL/L — SIGNIFICANT CHANGE UP (ref 3.5–5.3)
POTASSIUM SERPL-MCNC: 4.1 MMOL/L — SIGNIFICANT CHANGE UP (ref 3.5–5.3)
POTASSIUM SERPL-SCNC: 4.1 MMOL/L — SIGNIFICANT CHANGE UP (ref 3.5–5.3)
POTASSIUM SERPL-SCNC: 4.1 MMOL/L — SIGNIFICANT CHANGE UP (ref 3.5–5.3)
PROT SERPL-MCNC: 7.9 G/DL — SIGNIFICANT CHANGE UP (ref 6–8.3)
PROT SERPL-MCNC: 7.9 G/DL — SIGNIFICANT CHANGE UP (ref 6–8.3)
RBC # BLD: 4.77 M/UL — SIGNIFICANT CHANGE UP (ref 3.8–5.2)
RBC # BLD: 4.77 M/UL — SIGNIFICANT CHANGE UP (ref 3.8–5.2)
RBC # FLD: 14.1 % — SIGNIFICANT CHANGE UP (ref 10.3–14.5)
RBC # FLD: 14.1 % — SIGNIFICANT CHANGE UP (ref 10.3–14.5)
SODIUM SERPL-SCNC: 139 MMOL/L — SIGNIFICANT CHANGE UP (ref 135–145)
SODIUM SERPL-SCNC: 139 MMOL/L — SIGNIFICANT CHANGE UP (ref 135–145)
WBC # BLD: 11.89 K/UL — HIGH (ref 3.8–10.5)
WBC # BLD: 11.89 K/UL — HIGH (ref 3.8–10.5)
WBC # FLD AUTO: 11.89 K/UL — HIGH (ref 3.8–10.5)
WBC # FLD AUTO: 11.89 K/UL — HIGH (ref 3.8–10.5)

## 2023-12-18 PROCEDURE — 83690 ASSAY OF LIPASE: CPT

## 2023-12-18 PROCEDURE — 83735 ASSAY OF MAGNESIUM: CPT

## 2023-12-18 PROCEDURE — 84100 ASSAY OF PHOSPHORUS: CPT

## 2023-12-18 PROCEDURE — 80053 COMPREHEN METABOLIC PANEL: CPT

## 2023-12-18 PROCEDURE — 36415 COLL VENOUS BLD VENIPUNCTURE: CPT

## 2023-12-18 PROCEDURE — 99284 EMERGENCY DEPT VISIT MOD MDM: CPT

## 2023-12-18 PROCEDURE — 85025 COMPLETE CBC W/AUTO DIFF WBC: CPT

## 2023-12-18 PROCEDURE — 99284 EMERGENCY DEPT VISIT MOD MDM: CPT | Mod: 25

## 2023-12-18 PROCEDURE — 96374 THER/PROPH/DIAG INJ IV PUSH: CPT

## 2023-12-18 PROCEDURE — 84702 CHORIONIC GONADOTROPIN TEST: CPT

## 2023-12-18 RX ORDER — SODIUM CHLORIDE 9 MG/ML
1000 INJECTION INTRAMUSCULAR; INTRAVENOUS; SUBCUTANEOUS ONCE
Refills: 0 | Status: DISCONTINUED | OUTPATIENT
Start: 2023-12-18 | End: 2023-12-22

## 2023-12-18 RX ORDER — ONDANSETRON 8 MG/1
4 TABLET, FILM COATED ORAL ONCE
Refills: 0 | Status: DISCONTINUED | OUTPATIENT
Start: 2023-12-18 | End: 2023-12-22

## 2023-12-18 RX ADMIN — Medication 1 MILLIGRAM(S): at 20:25

## 2023-12-18 NOTE — ED ADULT NURSE NOTE - NSFALLUNIVINTERV_ED_ALL_ED
Bed/Stretcher in lowest position, wheels locked, appropriate side rails in place/Call bell, personal items and telephone in reach/Instruct patient to call for assistance before getting out of bed/chair/stretcher/Non-slip footwear applied when patient is off stretcher/[place] to call system/Physically safe environment - no spills, clutter or unnecessary equipment/Purposeful proactive rounding/Room/bathroom lighting operational, light cord in reach Bed/Stretcher in lowest position, wheels locked, appropriate side rails in place/Call bell, personal items and telephone in reach/Instruct patient to call for assistance before getting out of bed/chair/stretcher/Non-slip footwear applied when patient is off stretcher/[place] to call system/Physically safe environment - no spills, clutter or unnecessary equipment/Purposeful proactive rounding/Room/bathroom lighting operational, light cord in reach

## 2023-12-18 NOTE — ED PROVIDER NOTE - NSFOLLOWUPINSTRUCTIONS_ED_ALL_ED_FT
** Stay hydrated and drink things with sugar and electrolytes if you are not eating much food.   ** Follow up with your primary care doctor in the next 72 hours.     ** Go to the nearest Emergency Department if you experience any new or concerning symptoms, such as:   - worsening abdominal pain  - difficulty breathing  - passing out  - unable to eat or drink  - unable to move or feel part of your body  - fever, chills
No pertinent past medical history

## 2023-12-18 NOTE — ED PROVIDER NOTE - PATIENT PORTAL LINK FT
You can access the FollowMyHealth Patient Portal offered by [facility] by registering at the following website: [email or web address]. By joining [employer]’s FollowMyHealth portal, you will also be able to view your health information using other applications (apps) compatible with our system. You can access the FollowMyHealth Patient Portal offered by [facility] by registering at the following website: [email or web address]. By joining [employer]’[facility] FollowMyHealth portal, you will also be able to view your health information using other applications (apps) compatible with our system.

## 2023-12-18 NOTE — ED PROVIDER NOTE - OBJECTIVE STATEMENT
hx of cyclic vomtiing. 30-year-old female history of cyclic vomiting presents with chief complaint of vomiting unable to tolerate p.o.  Patient states presentation today is vomiting consistent with prior episodes of cyclic vomiting syndromes,  no blood in vomit, no MJ use.

## 2023-12-18 NOTE — ED ADULT NURSE NOTE - OBJECTIVE STATEMENT
Pt aaox3, came in to ED complaining of vomiting. Denies chest pain, sob, pain. Pt reports this occur about 3 times a year.

## 2023-12-18 NOTE — ED PROVIDER NOTE - CLINICAL SUMMARY MEDICAL DECISION MAKING FREE TEXT BOX
30-year-old female history of cyclic vomiting presents with chief complaint of vomiting unable to tolerate p.o.  Patient states presentation today is vomiting consistent with prior episodes of cyclic vomiting syndromes.  Exam, well-appearing female, nontender abdomen, nonlabored breathing no acute distress    Differential includes, but not limited to: Cyclic vomiting syndrome, low suspicion for pancreatitis given no abdominal pain, very low suspicion for bowel obstruction given abdomen is nondistended nontender.    Plan to check electrolytes, medicate, p.o. challenge.  Anticipate outpatient follow-up.

## 2023-12-18 NOTE — ED PROVIDER NOTE - RAPID ASSESSMENT
30-year-old female history of cyclic vomiting presents with chief complaint of vomiting unable to tolerate p.o.  Patient states presentation today is vomiting consistent with prior episodes of cyclic vomiting syndromes.

## 2023-12-18 NOTE — ED ADULT NURSE NOTE - NSFALLRISKASMT_ED_ALL_ED_DT
END OF SHIFT NOTE:    INTAKE/OUTPUT  04/10 0701 - 04/11 0700  In: 2105 [I.V.:2105]  Out: 3450 [Urine:2550]  Voiding: YES  Catheter: NO  Drain:   Nasogastric Tube 04/08/17 (Active)   Site Assessment Clean, dry, & intact 4/11/2017 12:44 AM   Dressing Status Clean, dry, & intact 4/11/2017 12:44 AM   G Port Status Intermittent Suction 4/10/2017  8:20 PM   Action Taken Other (comment) 4/11/2017 12:44 AM   Drainage Description Orie Daft 4/11/2017 12:44 AM   Drainage Chamber Level (ml) 900 ml 4/11/2017 12:44 AM   Output (ml) 300 ml 4/11/2017 12:44 AM               Flatus: Patient does have flatus present. Stool:  0 occurrences. Characteristics:       Emesis: 0 occurrences. Characteristics:        VITAL SIGNS  Patient Vitals for the past 12 hrs:   Temp Pulse Resp BP SpO2   04/11/17 0300 98 °F (36.7 °C) 74 16 129/87 97 %   04/10/17 2300 98.6 °F (37 °C) 65 16 121/71 95 %       Pain Assessment  Pain Intensity 1: 8 (04/11/17 0715)  Pain Location 1: Abdomen  Pain Intervention(s) 1: Medication (see MAR)  Patient Stated Pain Goal: 0    Ambulating  Yes    Shift report given to oncoming nurse at the bedside.     Michelle Noble RN 18-Dec-2023 22:22

## 2023-12-18 NOTE — ED PROVIDER NOTE - PHYSICAL EXAMINATION
Gen: Well appearing in NAD   Head: NC/AT  Neck: trachea midline  Resp:  No distress  Ext: no deformities  ABD: non tender, non distended abdomen.   Neuro:  A&O appears non focal  Skin:  Warm and dry as visualized  Psych:  Normal affect and mood

## 2023-12-18 NOTE — ED ADULT NURSE NOTE - HOW OFTEN DO YOU HAVE A DRINK CONTAINING ALCOHOL?
Closure 3 Information: This tab is for additional flaps and grafts above and beyond our usual structured repairs.  Please note if you enter information here it will not currently bill and you will need to add the billing information manually. Never

## 2023-12-27 NOTE — ED PROVIDER NOTE - NSFOLLOWUPINSTRUCTIONS_ED_ALL_ED_FT
Spoke with the pt. Scheduled abdominal aortogram with Dr. Larisa Jose 1/19/24 at 9:30 a.m. She was instructed to report to [facility] ([facility] 1st floor registration at 7:30 a.m, be NPO after midnight the night before except heart and/or BP meds in the a.m with sips of water, hold Eliquis x 2 days and to have transportation. Cyclic Vomiting Syndrome, Adult      Cyclic vomiting syndrome (CVS) is a condition that causes episodes of severe nausea and vomiting. It can last for hours or even days. Attacks may occur several times a month or several times a year. Between episodes of CVS, you may be otherwise healthy.      What are the causes?    The cause of this condition is not known. Although many of the episodes can happen for no obvious reason, you may have specific CVS triggers. Episodes may be triggered by:  •An infection, especially colds and the flu.      •Emotional stress, including excitement or anxiety about upcoming events, such as school, parties, or travel.      •Certain foods or beverages, such as chocolate, cheese, alcohol, and food additives.      •Motion sickness.      •Eating a large meal before bed.      •Being very tired.      •Being too hot.        What increases the risk?    You are more likely to develop this condition if:  •You get migraine headaches.      •You have a family history of CVS or migraine headaches.        What are the signs or symptoms?    Symptoms tend to happen at the same time of day, and each episode tends to last about the same amount of time. Symptoms commonly start at night or when you wake up. Many people have warning signs (prodrome) before an episode, which may include slight nausea, sweating, and pale skin (pallor).    The most common symptoms of a CVS attack include:  •Severe vomiting. Vomiting may happen every 5–15 minutes.      •Severe nausea.      •Gagging (retching).      Other symptoms may include:  •Headache.      •Dizziness.      •Sensitivity to light.      •Extreme thirst.      •Abdominal pain. This can be severe.      •Loose stools or diarrhea.      •Fever.      •Pale skin (pallor), especially on the face.      •Weakness.      •Exhaustion.      •Sleepiness after a CVS episode.    •Dehydration. This can cause:  •Thirst.      •Dry mouth.      •Decreased urination.      •Fatigue.          How is this diagnosed?    This condition may be diagnosed based on your symptoms, medical history, and family history of CVS or migraine. Your health care provider will ask whether you have had:  •Episodes of severe nausea and vomiting that have happened a total of 5 or more times, or 3 or more times in the past 6 months.      •Episodes that last for 1 hour or more, and occur 1 week apart or farther apart.      •Episodes that are similar each time.      •Normal health between episodes.      Your health care provider will also do a physical exam. To rule out other conditions, you may have tests, such as:  •Blood tests.      •Urine tests.      •Imaging tests.        How is this treated?  A prescription pill bottle with an example of a pill.   There is no cure for this condition, but treatment can help manage or prevent CVS episodes. Work with your health care provider to find the best treatment for you. Treatment may include:  •Avoiding stress and CVS triggers.      •Eating smaller, more frequent meals.    •Taking medicines, such as:  •Over-the-counter pain medicine.      •Anti-nausea medicines.      •Antacids.      •Antihistamines.      •Medicines for migraines.      •Antidepressants.      •Antibiotics.        Severe nausea and vomiting may require you to stay at the hospital. You may need IV fluids to prevent or treat dehydration.      Follow these instructions at home:    During an episode     •Take over-the-counter and prescription medicines only as told by your health care provider.      •Stay in bed and rest in a dark, quiet room.        After an episode   Illustration of a person drinking a glass of water.   •Drink an oral rehydration solution (ORS), if directed by your health care provider. This is a drink that helps you replace fluids and the salts and minerals in your blood (electrolytes). It can be found at pharmacies and retail stores.    •Drink small amounts of clear fluids slowly and gradually add more.   •Drink clear fluids such as water or fruit juice that has water added (is diluted). You may also eat low-calorie popsicles.      •Avoid drinking fluids that contain a lot of sugar or caffeine, such as sports drinks and soda.        •Eat soft foods in small amounts every 3–4 hours. Eat your regular diet, but avoid spicy or fatty foods, such as french fries and pizza.      General instructions     •Monitor your condition for any changes.      •If you were prescribed an antibiotic medicine, take it as told by your health care provider. Do not stop taking the antibiotic even if you start to feel better.      •Keep track of your attacks and symptoms, and pay attention to any triggers. Avoid those triggers when you can.      •Keep all follow-up visits as told by your health care provider. This is important.        Contact a health care provider if:    •Your condition gets worse.      •You cannot drink fluids without vomiting.      •You have pain and trouble swallowing after an episode.        Get help right away if:    •You have blood in your vomit.      •Your vomit looks like coffee grounds.      •You have stools that are bloody or black, or stools that look like tar.    •You have signs of dehydration, such as:  •Sunken eyes.      •Not making tears while crying.      •Very dry mouth.      •Cracked lips.      •Decreased urine production.      •Dark urine. Urine may be the color of tea.      •Weakness.      •Sleepiness.          Summary    •Cyclic vomiting syndrome (CVS) causes episodes of severe nausea and vomiting that can last for hours or even days.      •Treatment can help you manage or prevent CVS episodes. Work with your health care provider to find the best treatment for you.      •Vomiting and diarrhea can make you feel weak and can lead to dehydration. If you notice signs of dehydration, call your health care provider right away.      •Keep all follow-up visits as told by your health care provider. This is important.      This information is not intended to replace advice given to you by your health care provider. Make sure you discuss any questions you have with your health care provider.

## 2024-01-01 NOTE — ED PROVIDER NOTE - CARE PROVIDER_API CALL
Attending and PA/NP shared services statement (NON-critical care):
Celestine Alejandra (MD)  Gastroenterology; Internal Medicine  [address] [place] [address]  Phone: [phone]  Fax: [phone]  Follow Up Time:

## 2024-01-05 NOTE — ED ADULT TRIAGE NOTE - AS O2 DELIVERY
HOSPITALIST SERVICE ADMISSION NOTE     Patient: Stevie King Date: 1/4/2024   YOB: 2001 Admission Date: 1/4/2024   MRN: [id number] Attending: Jim Mathis MD     PMD: No primary care provider on file.    Chief Complaint:   Chief Complaint   Patient presents with    Penis/Scrotum Problem        HISTORY AND PHYSICAL     HPI:  This is a 23yo male under police custody who presented to ED after found inserting pieces of plastic fork into his penis via the urethra. He denies any urinary complaints or blood in urine, he continues to urinate without difficulty. He initially reported that he did this \"to make the CO work\" however on my assessment he stated he \"wanted to self harm.\" When I asked if he has attempted suicide before he reported yes, by overdosing on pills. When asked if it is his intention to commit suicide now, he stated yes. Denies fever, chills, penile pain, painful urination at this time. He has been in police custody for a year    In the ED, afebrile, 154/82, CBC and CMP normal. UA normal    Urology consulted from the ED, patient to be NPO.     No past medical history on file.    No past surgical history on file.    (Not in a hospital admission)      ALLERGIES:  No Known Allergies    No family history on file.      SOCIAL HISTORY:     Tobacco use  Recreational substance use  Alcohol use  denies    REVIEW OF SYSTEMS     GEN: Denies weight changes, Denies fever, Denies chills, Denies fatigue, Denies changes in appetite  HEENT: Denies change in vision, Denies headache, Denies hearing impairment, Denies rhinorrhea, Denies sore throat, Denies difficulty swallowing  CV: Denies chest pain, Denies dyspnea on exertion, Denies lower extremity swelling, Denies syncope, Denies palpitations, Denies orthopnea, Denies PND  PULM: Denies cough, Denies shortness of breath, Denies hemoptysis  GI: Denies nausea, Denies vomiting, Denies diarrhea, Denies constipation  : Denies urinary urgency, Denies increase  frequency, Denies hematuria, Denies flank pain, Denies suprapubic pain, Denies dysuria  PSYCH: + suicidal ideation, Denies auditory/visual/tactile hallucinations    PHYSICAL EXAM     Vital Signs (most recent): Visit Vitals  BP (!) 154/82 (BP Location: LUE - Left upper extremity, Patient Position: Sitting/High-Bah's)   Pulse 94   Temp 98.9 °F (37.2 °C) (Oral)   Resp 16   SpO2 98%       General: Patient is in no acute distress. Patient is comfortable and conversant  HEENT: Pupils equally round and reactive to light and accommodation. No scleral icterus. No conjunctival pallor. Oropharyngeal mucous membranes are moist. No pharyngeal erythema/exudate. Neck is soft and supple. No palpable cervical lymphadenopathy  CV: Regular rate and rhythm, normal S1 and S2. No murmur. No pericardial rub. No JVD. No carotid bruit.   Pulm: Speaks in full sentences. No conversational dyspnea. Lungs are clear to auscultation bilaterally. No wheezes, rales or rhonchi. Air entry normal and equal bilaterally.   GI: Soft, nontender and nondistended. No palpable hepatic/splenic enlargement. No palpable mass. Bowel sounds are present, normoactive. No guarding, no rebound tenderness.   : No costovertebral angle tenderness, no suprapubic tenderness. Penis appears normal, no rash, skin breaks, abrasions, inspection of the urethra did not reveal any foreign body  Psych: Awake, alert, and oriented to person, place, time, and medical condition.    LABS       CBC:  Lab Results   Component Value Date/Time    WBC 6.7 01/04/2024 10:07 PM    HGB 15.8 01/04/2024 10:07 PM    HCT 46.3 01/04/2024 10:07 PM     01/04/2024 10:07 PM       CMP:  Lab Results   Component Value Date/Time    SODIUM 140 01/04/2024 10:07 PM    POTASSIUM 4.4 01/04/2024 10:07 PM    CHLORIDE 103 01/04/2024 10:07 PM    CO2 29 01/04/2024 10:07 PM    BUN 12 01/04/2024 10:07 PM    CREATININE 0.81 01/04/2024 10:07 PM    GLUCOSE 99 01/04/2024 10:07 PM    CALCIUM 9.1 01/04/2024 10:07  PM    ALBUMIN 4.0 01/04/2024 10:07 PM    AST 25 01/04/2024 10:07 PM    GPT 30 01/04/2024 10:07 PM    ALKPT 91 01/04/2024 10:07 PM    BILIRUBIN 0.8 01/04/2024 10:07 PM     Cardiac Markers:  No results found for: \"MYOGLOBIN\", \"RAPDTR\", \"CPK\", \"MMB\", \"BNP\"    IMAGING      No orders to display       EKG interpretation       ASSESSMENT AND PLAN     Foreign body insertion into penis  SI  Afebrile, no leukocytosis  UA reviewed, normal  Urology consulted from the ED  NPO  Psychiatry consult  1:1 SI precautions    Elevated BP, likely essential vs secondary hypertension   EKG  IV hydralazine prn  Telemetry    Under police custody  SW consult    DVT prophylaxis: scd  POA/ Next of Kin: none  Code Status: full code    DISPOSITION: urology eval, BP control     I certify that the patient requires admission for observation, with expected length of stay of less than two midnights.      Above assessment and plan discussed with the patient and/or patient's family at bedside. All of their questions were answered.    Jim Mathis MD  Internal Medicine  Hospitalist, [facility]    From 7PM to 7AM, please call the Hospitalist-On-Call at [phone]     room air

## 2024-02-11 ENCOUNTER — NON-APPOINTMENT (OUTPATIENT)
Age: 39
End: 2024-02-11

## 2024-02-23 NOTE — ED ADULT NURSE NOTE - NS ED NURSE DISCH DISPOSITION
Patient received a new CPAP device recently via fullface mask.  I reviewed compliance data from 1/24/2024 through 2/22/2024.  Patient use the device every night for an average of 9 hours and 54 minutes.  Average AHI 0.3.  He is demonstrating compliance and ongoing use is medically necessary.  I would like to perform overnight pulse oximetry while on CPAP to ensure the oxygen noted on original sleep study is resolved with current settings.  He is on auto titrating ranging between 7 and 15 cmH2O.   Discharged

## 2024-04-02 ENCOUNTER — EMERGENCY (EMERGENCY)
Facility: HOSPITAL | Age: 39
LOS: 1 days | Discharge: HOME | End: 2024-04-02
Attending: EMERGENCY MEDICINE | Admitting: EMERGENCY MEDICINE
Payer: COMMERCIAL

## 2024-04-02 VITALS
DIASTOLIC BLOOD PRESSURE: 58 MMHG | HEART RATE: 80 BPM | SYSTOLIC BLOOD PRESSURE: 105 MMHG | OXYGEN SATURATION: 97 % | RESPIRATION RATE: 15 BRPM

## 2024-04-02 VITALS
DIASTOLIC BLOOD PRESSURE: 61 MMHG | SYSTOLIC BLOOD PRESSURE: 109 MMHG | OXYGEN SATURATION: 97 % | TEMPERATURE: 98 F | WEIGHT: 138.01 LBS | RESPIRATION RATE: 18 BRPM | HEART RATE: 86 BPM

## 2024-04-02 DIAGNOSIS — Z98.891 HISTORY OF UTERINE SCAR FROM PREVIOUS SURGERY: Chronic | ICD-10-CM

## 2024-04-02 LAB
ALBUMIN SERPL ELPH-MCNC: 3.9 G/DL — SIGNIFICANT CHANGE UP (ref 3.3–5)
ALP SERPL-CCNC: 68 U/L — SIGNIFICANT CHANGE UP (ref 40–120)
ALT FLD-CCNC: 15 U/L — SIGNIFICANT CHANGE UP (ref 10–45)
ANION GAP SERPL CALC-SCNC: 10 MMOL/L — SIGNIFICANT CHANGE UP (ref 5–17)
AST SERPL-CCNC: 40 U/L — SIGNIFICANT CHANGE UP (ref 10–40)
BASOPHILS # BLD AUTO: 0.04 K/UL — SIGNIFICANT CHANGE UP (ref 0–0.2)
BASOPHILS NFR BLD AUTO: 0.4 % — SIGNIFICANT CHANGE UP (ref 0–2)
BILIRUB SERPL-MCNC: 0.6 MG/DL — SIGNIFICANT CHANGE UP (ref 0.2–1.2)
BUN SERPL-MCNC: 12 MG/DL — SIGNIFICANT CHANGE UP (ref 7–23)
CALCIUM SERPL-MCNC: 8.9 MG/DL — SIGNIFICANT CHANGE UP (ref 8.4–10.5)
CHLORIDE SERPL-SCNC: 104 MMOL/L — SIGNIFICANT CHANGE UP (ref 96–108)
CO2 SERPL-SCNC: 24 MMOL/L — SIGNIFICANT CHANGE UP (ref 22–31)
CREAT SERPL-MCNC: 0.72 MG/DL — SIGNIFICANT CHANGE UP (ref 0.5–1.3)
EGFR: 110 ML/MIN/1.73M2 — SIGNIFICANT CHANGE UP
EOSINOPHIL # BLD AUTO: 0.04 K/UL — SIGNIFICANT CHANGE UP (ref 0–0.5)
EOSINOPHIL NFR BLD AUTO: 0.4 % — SIGNIFICANT CHANGE UP (ref 0–6)
GLUCOSE SERPL-MCNC: 103 MG/DL — HIGH (ref 70–99)
HCT VFR BLD CALC: 40.1 % — SIGNIFICANT CHANGE UP (ref 34.5–45)
HGB BLD-MCNC: 13.1 G/DL — SIGNIFICANT CHANGE UP (ref 11.5–15.5)
IMM GRANULOCYTES NFR BLD AUTO: 0.3 % — SIGNIFICANT CHANGE UP (ref 0–0.9)
LIDOCAIN IGE QN: 37 U/L — SIGNIFICANT CHANGE UP (ref 16–77)
LYMPHOCYTES # BLD AUTO: 1.98 K/UL — SIGNIFICANT CHANGE UP (ref 1–3.3)
LYMPHOCYTES # BLD AUTO: 18.4 % — SIGNIFICANT CHANGE UP (ref 13–44)
MCHC RBC-ENTMCNC: 28.4 PG — SIGNIFICANT CHANGE UP (ref 27–34)
MCHC RBC-ENTMCNC: 32.7 GM/DL — SIGNIFICANT CHANGE UP (ref 32–36)
MCV RBC AUTO: 87 FL — SIGNIFICANT CHANGE UP (ref 80–100)
MONOCYTES # BLD AUTO: 0.57 K/UL — SIGNIFICANT CHANGE UP (ref 0–0.9)
MONOCYTES NFR BLD AUTO: 5.3 % — SIGNIFICANT CHANGE UP (ref 2–14)
NEUTROPHILS # BLD AUTO: 8.09 K/UL — HIGH (ref 1.8–7.4)
NEUTROPHILS NFR BLD AUTO: 75.2 % — SIGNIFICANT CHANGE UP (ref 43–77)
NRBC # BLD: 0 /100 WBCS — SIGNIFICANT CHANGE UP (ref 0–0)
PLATELET # BLD AUTO: 356 K/UL — SIGNIFICANT CHANGE UP (ref 150–400)
POTASSIUM SERPL-MCNC: 3.4 MMOL/L — LOW (ref 3.5–5.3)
POTASSIUM SERPL-MCNC: 5.7 MMOL/L — HIGH (ref 3.5–5.3)
POTASSIUM SERPL-SCNC: 3.4 MMOL/L — LOW (ref 3.5–5.3)
POTASSIUM SERPL-SCNC: 5.7 MMOL/L — HIGH (ref 3.5–5.3)
PROT SERPL-MCNC: 8.1 G/DL — SIGNIFICANT CHANGE UP (ref 6–8.3)
RBC # BLD: 4.61 M/UL — SIGNIFICANT CHANGE UP (ref 3.8–5.2)
RBC # FLD: 13.6 % — SIGNIFICANT CHANGE UP (ref 10.3–14.5)
SODIUM SERPL-SCNC: 138 MMOL/L — SIGNIFICANT CHANGE UP (ref 135–145)
WBC # BLD: 10.75 K/UL — HIGH (ref 3.8–10.5)
WBC # FLD AUTO: 10.75 K/UL — HIGH (ref 3.8–10.5)

## 2024-04-02 PROCEDURE — 99284 EMERGENCY DEPT VISIT MOD MDM: CPT | Mod: 25

## 2024-04-02 PROCEDURE — 96361 HYDRATE IV INFUSION ADD-ON: CPT

## 2024-04-02 PROCEDURE — 96365 THER/PROPH/DIAG IV INF INIT: CPT

## 2024-04-02 PROCEDURE — 96375 TX/PRO/DX INJ NEW DRUG ADDON: CPT

## 2024-04-02 PROCEDURE — 80053 COMPREHEN METABOLIC PANEL: CPT

## 2024-04-02 PROCEDURE — 99284 EMERGENCY DEPT VISIT MOD MDM: CPT

## 2024-04-02 PROCEDURE — 85025 COMPLETE CBC W/AUTO DIFF WBC: CPT

## 2024-04-02 PROCEDURE — 36415 COLL VENOUS BLD VENIPUNCTURE: CPT

## 2024-04-02 PROCEDURE — 84132 ASSAY OF SERUM POTASSIUM: CPT

## 2024-04-02 PROCEDURE — 83690 ASSAY OF LIPASE: CPT

## 2024-04-02 PROCEDURE — 96376 TX/PRO/DX INJ SAME DRUG ADON: CPT

## 2024-04-02 RX ORDER — ONDANSETRON 8 MG/1
1 TABLET, FILM COATED ORAL
Qty: 1 | Refills: 0
Start: 2024-04-02 | End: 2024-04-04

## 2024-04-02 RX ORDER — DIPHENHYDRAMINE HCL 50 MG
25 CAPSULE ORAL ONCE
Refills: 0 | Status: COMPLETED | OUTPATIENT
Start: 2024-04-02 | End: 2024-04-02

## 2024-04-02 RX ORDER — ONDANSETRON 8 MG/1
1 TABLET, FILM COATED ORAL
Qty: 1 | Refills: 0 | DISCHARGE
Start: 2024-04-02 | End: 2024-04-04

## 2024-04-02 RX ORDER — SODIUM CHLORIDE 9 MG/ML
1000 INJECTION INTRAMUSCULAR; INTRAVENOUS; SUBCUTANEOUS ONCE
Refills: 0 | Status: COMPLETED | OUTPATIENT
Start: 2024-04-02 | End: 2024-04-02

## 2024-04-02 RX ORDER — ONDANSETRON 8 MG/1
1 TABLET, FILM COATED ORAL
Qty: 30 | Refills: 0
Start: 2024-04-02 | End: 2024-04-21

## 2024-04-02 RX ORDER — ONDANSETRON 8 MG/1
4 TABLET, FILM COATED ORAL ONCE
Refills: 0 | Status: COMPLETED | OUTPATIENT
Start: 2024-04-02 | End: 2024-04-02

## 2024-04-02 RX ORDER — FAMOTIDINE 10 MG/ML
20 INJECTION INTRAVENOUS ONCE
Refills: 0 | Status: COMPLETED | OUTPATIENT
Start: 2024-04-02 | End: 2024-04-02

## 2024-04-02 RX ADMIN — FAMOTIDINE 20 MILLIGRAM(S): 10 INJECTION INTRAVENOUS at 09:10

## 2024-04-02 RX ADMIN — SODIUM CHLORIDE 1000 MILLILITER(S): 9 INJECTION INTRAMUSCULAR; INTRAVENOUS; SUBCUTANEOUS at 09:50

## 2024-04-02 RX ADMIN — ONDANSETRON 4 MILLIGRAM(S): 8 TABLET, FILM COATED ORAL at 08:49

## 2024-04-02 RX ADMIN — FAMOTIDINE 100 MILLIGRAM(S): 10 INJECTION INTRAVENOUS at 08:51

## 2024-04-02 RX ADMIN — ONDANSETRON 4 MILLIGRAM(S): 8 TABLET, FILM COATED ORAL at 10:06

## 2024-04-02 RX ADMIN — Medication 1 MILLIGRAM(S): at 08:49

## 2024-04-02 RX ADMIN — Medication 25 MILLIGRAM(S): at 11:03

## 2024-04-02 RX ADMIN — SODIUM CHLORIDE 1000 MILLILITER(S): 9 INJECTION INTRAMUSCULAR; INTRAVENOUS; SUBCUTANEOUS at 08:52

## 2024-04-02 NOTE — ED PROVIDER NOTE - OBJECTIVE STATEMENT
38-year-old female presents to the emergency department complaining of nausea vomiting.  Patient has history of cyclical vomiting.  She has medications at home such as Zofran and Pepcid however tried taking it and could not tolerate it.  Patient states it was not helping.  She had onset of symptoms last night.  Extended into this morning.  Came to the ED due to feeling weak tired and continued nausea with upper abdominal pain.  No fevers or chills.  No diarrhea.  patient has strong history of similar.  No unique symptoms at this time in comparison to patient prior episodes.

## 2024-04-02 NOTE — ED PROVIDER NOTE - CLINICAL SUMMARY MEDICAL DECISION MAKING FREE TEXT BOX
38-year-old female presents to the emergency department complaining of nausea vomiting.  Patient has history of cyclical vomiting.  She has medications at home such as Zofran and Pepcid however tried taking it and could not tolerate it.  Patient states it was not helping.  She had onset of symptoms last night.  Extended into this morning.  Came to the ED due to feeling weak tired and continued nausea with upper abdominal pain.  No fevers or chills.  No diarrhea.  patient has strong history of similar.  No unique symptoms at this time in comparison to patient prior episodes.   Exam as stated. Plan for IV fluids and meds prn symptoms. Reassess. 38-year-old female presents to the emergency department complaining of nausea vomiting.  Patient has history of cyclical vomiting.  She has medications at home such as Zofran and Pepcid however tried taking it and could not tolerate it.  Patient states it was not helping.  She had onset of symptoms last night.  Extended into this morning.  Came to the ED due to feeling weak tired and continued nausea with upper abdominal pain.  No fevers or chills.  No diarrhea.  patient has strong history of similar.  No unique symptoms at this time in comparison to patient prior episodes.   Exam as stated. Plan for IV fluids and meds prn symptoms. Reassess.    Pt sleeping; Saying she is still nauseous.   Given addtl meds.     Saying she is feeling anxious. We have already given ativan. Will give benadryl     Sleeping. No addtl meds required.   Stable for dc.

## 2024-04-02 NOTE — ED ADULT NURSE NOTE - OBJECTIVE STATEMENT
Pt presents to ED from home for vomiting. Pt states she has been feeling anxious and nausea/vomiting since yesterday. Reports a history of anxiety and cyclical vomiting. Denies abdominal pain.

## 2024-04-02 NOTE — ED PROVIDER NOTE - TOBACCO USE
Unknown if ever smoked
Initiate Treatment: \\nclobetasol 0.05 % scalp solution Scalp Sig: Mix 50/50 cerave moisturizing cream apply to affected areas BID PRN flares.\\nDupixent 300 mg/2 mL  SQ initiation \\nhydroxyzine HCl 10 mg tablet PO Sig: Take 1 tablet QPM PRM itch\\nmupirocin 2 % topical ointment TP Sig: Apply to affected open areas twice daily.\\n\\n
Detail Level: Zone

## 2024-04-02 NOTE — ED PROVIDER NOTE - PATIENT PORTAL LINK FT
You can access the FollowMyHealth Patient Portal offered by [facility] by registering at the following website: [email or web address]. By joining [employer]’s FollowMyHealth portal, you will also be able to view your health information using other applications (apps) compatible with our system.

## 2024-04-02 NOTE — ED ADULT TRIAGE NOTE - CHIEF COMPLAINT QUOTE
Patient to ED for complaints of nausea and vomiting. Denies fevers, abdominal pain, travel, sick contact.  PMHX: Anxiety.

## 2024-04-02 NOTE — ED ADULT NURSE NOTE - NSICDXPASTMEDICALHX_GEN_ALL_CORE_FT
Examination     Gen, well-appearing    Full skin exam performed except for underwear covered areas. Scars clear  trunk and extremities with scattered brown macules and papules   Chest with scattered erythematous roughly scaled macules  Left nasal tip with rough small tan macule  Cheeks, chin with moderate erythema and telangiectasias  FH and temples with stuck-on dull-surfaced brown papules  Back with 2 erythematous tan dull stuck on papules  Left frontal hairline with shiny pink ill-defined macule                Assessment and Plan     1. Hx of NMSC, no signs recurrence   1b. benign-appearing nevi and SKs  - educ re ABCD's of MM   educ sun protection   encouraged skin check yearly (sooner if indicated), self checks    2. AK - nasal tip - 1 and chest with 2  - 3 lesion(s) treated with liquid nitrogen x 2 cycles. Patient educated on risk of blister, hypopigmentation/scar and wound care. - plan for efudex for the chest again this winter    3. Rosacea - ET - moderate  - ed Vbeam - 200-250/trx x 3 trx    4. ISK's - back - 2  - lesion(s) on the above areas treated with liquid nitrogen x 2 cycles. Patient educated on risk of blister, hypopigmentation/scar and wound care. 5. L frontal hairline - r/o BCC  - Shave biopsy performed after verbal consent obtained. Patient educated regarding risk of bleeding, infection, scar and educated on wound care. Skin cleansed with alcohol pad and site anesthetized with lido + epi. Aluminum chloride applied to site for hemostasis. Petrolatum ointment and bandage applied. Specimen bottle labeled with patient information and site and specimen sent to dermpath. (85 -150 previously for other elective removal of SK's)     F/u 1 year for FSE.
PAST MEDICAL HISTORY:  Gastritis     Kidney stone     Urinary tract infection without hematuria, site unspecified frequent reoccuring UTIs as per patient

## 2024-04-02 NOTE — ED ADULT NURSE NOTE - CADM POA URETHRAL CATHETER
Spoke w/Ava and informed her, unfortunately, [facility] does not have any availability until November 2020. This will bring [facility] past the 8 week timeline for circumcisions. [facility]. Urology number given to check on their availability for Dr. Fridea Woody.   No

## 2024-04-02 NOTE — ED PROVIDER NOTE - PHYSICAL EXAMINATION
General:     NAD, well-nourished, well-appearing  Lungs:     CTA b/l  CVS:     RRR  Abd:     +BS, soft, tender epigastrum, nondistended.   Ext:   no deformities or edema.   Skin: no rash

## 2024-04-06 ENCOUNTER — EMERGENCY (EMERGENCY)
Facility: HOSPITAL | Age: 39
LOS: 1 days | Discharge: HOME | End: 2024-04-06
Attending: EMERGENCY MEDICINE | Admitting: STUDENT IN AN ORGANIZED HEALTH CARE EDUCATION/TRAINING PROGRAM
Payer: COMMERCIAL

## 2024-04-06 VITALS
OXYGEN SATURATION: 92 % | RESPIRATION RATE: 18 BRPM | HEART RATE: 124 BPM | SYSTOLIC BLOOD PRESSURE: 142 MMHG | TEMPERATURE: 98 F | DIASTOLIC BLOOD PRESSURE: 82 MMHG | WEIGHT: 134.92 LBS | HEIGHT: 65 IN

## 2024-04-06 DIAGNOSIS — Z98.891 HISTORY OF UTERINE SCAR FROM PREVIOUS SURGERY: Chronic | ICD-10-CM

## 2024-04-06 LAB
ALBUMIN SERPL ELPH-MCNC: 3.8 G/DL — SIGNIFICANT CHANGE UP (ref 3.3–5)
ALP SERPL-CCNC: 62 U/L — SIGNIFICANT CHANGE UP (ref 40–120)
ALT FLD-CCNC: 18 U/L — SIGNIFICANT CHANGE UP (ref 10–45)
AMPHET UR-MCNC: NEGATIVE — SIGNIFICANT CHANGE UP
ANION GAP SERPL CALC-SCNC: 13 MMOL/L — SIGNIFICANT CHANGE UP (ref 5–17)
AST SERPL-CCNC: 15 U/L — SIGNIFICANT CHANGE UP (ref 10–40)
BARBITURATES UR SCN-MCNC: NEGATIVE — SIGNIFICANT CHANGE UP
BASOPHILS # BLD AUTO: 0.04 K/UL — SIGNIFICANT CHANGE UP (ref 0–0.2)
BASOPHILS NFR BLD AUTO: 0.5 % — SIGNIFICANT CHANGE UP (ref 0–2)
BENZODIAZ UR-MCNC: NEGATIVE — SIGNIFICANT CHANGE UP
BILIRUB SERPL-MCNC: 0.5 MG/DL — SIGNIFICANT CHANGE UP (ref 0.2–1.2)
BUN SERPL-MCNC: 12 MG/DL — SIGNIFICANT CHANGE UP (ref 7–23)
CALCIUM SERPL-MCNC: 8.9 MG/DL — SIGNIFICANT CHANGE UP (ref 8.4–10.5)
CHLORIDE SERPL-SCNC: 105 MMOL/L — SIGNIFICANT CHANGE UP (ref 96–108)
CO2 SERPL-SCNC: 24 MMOL/L — SIGNIFICANT CHANGE UP (ref 22–31)
COCAINE METAB.OTHER UR-MCNC: NEGATIVE — SIGNIFICANT CHANGE UP
CREAT SERPL-MCNC: 0.39 MG/DL — LOW (ref 0.5–1.3)
EGFR: 130 ML/MIN/1.73M2 — SIGNIFICANT CHANGE UP
EOSINOPHIL # BLD AUTO: 0.08 K/UL — SIGNIFICANT CHANGE UP (ref 0–0.5)
EOSINOPHIL NFR BLD AUTO: 0.9 % — SIGNIFICANT CHANGE UP (ref 0–6)
GLUCOSE SERPL-MCNC: 120 MG/DL — HIGH (ref 70–99)
HCT VFR BLD CALC: 41.8 % — SIGNIFICANT CHANGE UP (ref 34.5–45)
HGB BLD-MCNC: 13.9 G/DL — SIGNIFICANT CHANGE UP (ref 11.5–15.5)
IMM GRANULOCYTES NFR BLD AUTO: 0.3 % — SIGNIFICANT CHANGE UP (ref 0–0.9)
LIDOCAIN IGE QN: 32 U/L — SIGNIFICANT CHANGE UP (ref 16–77)
LYMPHOCYTES # BLD AUTO: 2.73 K/UL — SIGNIFICANT CHANGE UP (ref 1–3.3)
LYMPHOCYTES # BLD AUTO: 31 % — SIGNIFICANT CHANGE UP (ref 13–44)
MCHC RBC-ENTMCNC: 28.3 PG — SIGNIFICANT CHANGE UP (ref 27–34)
MCHC RBC-ENTMCNC: 33.3 GM/DL — SIGNIFICANT CHANGE UP (ref 32–36)
MCV RBC AUTO: 85.1 FL — SIGNIFICANT CHANGE UP (ref 80–100)
METHADONE UR-MCNC: NEGATIVE — SIGNIFICANT CHANGE UP
MONOCYTES # BLD AUTO: 0.65 K/UL — SIGNIFICANT CHANGE UP (ref 0–0.9)
MONOCYTES NFR BLD AUTO: 7.4 % — SIGNIFICANT CHANGE UP (ref 2–14)
NEUTROPHILS # BLD AUTO: 5.29 K/UL — SIGNIFICANT CHANGE UP (ref 1.8–7.4)
NEUTROPHILS NFR BLD AUTO: 59.9 % — SIGNIFICANT CHANGE UP (ref 43–77)
NRBC # BLD: 0 /100 WBCS — SIGNIFICANT CHANGE UP (ref 0–0)
OPIATES UR-MCNC: NEGATIVE — SIGNIFICANT CHANGE UP
PCP SPEC-MCNC: SIGNIFICANT CHANGE UP
PCP UR-MCNC: NEGATIVE — SIGNIFICANT CHANGE UP
PLATELET # BLD AUTO: 428 K/UL — HIGH (ref 150–400)
POTASSIUM SERPL-MCNC: 3.3 MMOL/L — LOW (ref 3.5–5.3)
POTASSIUM SERPL-SCNC: 3.3 MMOL/L — LOW (ref 3.5–5.3)
PROT SERPL-MCNC: 7.7 G/DL — SIGNIFICANT CHANGE UP (ref 6–8.3)
RBC # BLD: 4.91 M/UL — SIGNIFICANT CHANGE UP (ref 3.8–5.2)
RBC # FLD: 13.4 % — SIGNIFICANT CHANGE UP (ref 10.3–14.5)
SODIUM SERPL-SCNC: 142 MMOL/L — SIGNIFICANT CHANGE UP (ref 135–145)
THC UR QL: POSITIVE
WBC # BLD: 8.82 K/UL — SIGNIFICANT CHANGE UP (ref 3.8–10.5)
WBC # FLD AUTO: 8.82 K/UL — SIGNIFICANT CHANGE UP (ref 3.8–10.5)

## 2024-04-06 PROCEDURE — 99285 EMERGENCY DEPT VISIT HI MDM: CPT

## 2024-04-06 PROCEDURE — 99053 MED SERV 10PM-8AM 24 HR FAC: CPT

## 2024-04-06 PROCEDURE — 99223 1ST HOSP IP/OBS HIGH 75: CPT

## 2024-04-06 RX ORDER — ACETAMINOPHEN 500 MG
650 TABLET ORAL EVERY 6 HOURS
Refills: 0 | Status: DISCONTINUED | OUTPATIENT
Start: 2024-04-06 | End: 2024-04-07

## 2024-04-06 RX ORDER — PANTOPRAZOLE SODIUM 20 MG/1
40 TABLET, DELAYED RELEASE ORAL DAILY
Refills: 0 | Status: DISCONTINUED | OUTPATIENT
Start: 2024-04-06 | End: 2024-04-07

## 2024-04-06 RX ORDER — LANOLIN ALCOHOL/MO/W.PET/CERES
3 CREAM (GRAM) TOPICAL AT BEDTIME
Refills: 0 | Status: DISCONTINUED | OUTPATIENT
Start: 2024-04-06 | End: 2024-04-09

## 2024-04-06 RX ORDER — ONDANSETRON 8 MG/1
4 TABLET, FILM COATED ORAL ONCE
Refills: 0 | Status: COMPLETED | OUTPATIENT
Start: 2024-04-06 | End: 2024-04-06

## 2024-04-06 RX ORDER — TRAZODONE HCL 50 MG
50 TABLET ORAL AT BEDTIME
Refills: 0 | Status: DISCONTINUED | OUTPATIENT
Start: 2024-04-06 | End: 2024-04-06

## 2024-04-06 RX ORDER — SODIUM CHLORIDE 9 MG/ML
1000 INJECTION INTRAMUSCULAR; INTRAVENOUS; SUBCUTANEOUS ONCE
Refills: 0 | Status: COMPLETED | OUTPATIENT
Start: 2024-04-06 | End: 2024-04-06

## 2024-04-06 RX ORDER — CAPSAICIN 0.025 %
1 CREAM (GRAM) TOPICAL ONCE
Refills: 0 | Status: COMPLETED | OUTPATIENT
Start: 2024-04-06 | End: 2024-04-06

## 2024-04-06 RX ORDER — DEXTROSE MONOHYDRATE, SODIUM CHLORIDE, AND POTASSIUM CHLORIDE 50; .745; 4.5 G/1000ML; G/1000ML; G/1000ML
1000 INJECTION, SOLUTION INTRAVENOUS
Refills: 0 | Status: DISCONTINUED | OUTPATIENT
Start: 2024-04-06 | End: 2024-04-08

## 2024-04-06 RX ORDER — ONDANSETRON 8 MG/1
4 TABLET, FILM COATED ORAL EVERY 4 HOURS
Refills: 0 | Status: DISCONTINUED | OUTPATIENT
Start: 2024-04-06 | End: 2024-04-09

## 2024-04-06 RX ORDER — FAMOTIDINE 10 MG/ML
20 INJECTION INTRAVENOUS ONCE
Refills: 0 | Status: COMPLETED | OUTPATIENT
Start: 2024-04-06 | End: 2024-04-06

## 2024-04-06 RX ADMIN — SODIUM CHLORIDE 1000 MILLILITER(S): 9 INJECTION INTRAMUSCULAR; INTRAVENOUS; SUBCUTANEOUS at 09:56

## 2024-04-06 RX ADMIN — Medication 1 MILLIGRAM(S): at 07:25

## 2024-04-06 RX ADMIN — ONDANSETRON 4 MILLIGRAM(S): 8 TABLET, FILM COATED ORAL at 07:59

## 2024-04-06 RX ADMIN — ONDANSETRON 4 MILLIGRAM(S): 8 TABLET, FILM COATED ORAL at 13:26

## 2024-04-06 RX ADMIN — Medication 0.5 MILLIGRAM(S): at 18:47

## 2024-04-06 RX ADMIN — ONDANSETRON 4 MILLIGRAM(S): 8 TABLET, FILM COATED ORAL at 09:33

## 2024-04-06 RX ADMIN — Medication 0.5 MILLIGRAM(S): at 10:20

## 2024-04-06 RX ADMIN — Medication 1 APPLICATION(S): at 09:32

## 2024-04-06 RX ADMIN — FAMOTIDINE 20 MILLIGRAM(S): 10 INJECTION INTRAVENOUS at 08:40

## 2024-04-06 RX ADMIN — ONDANSETRON 4 MILLIGRAM(S): 8 TABLET, FILM COATED ORAL at 17:10

## 2024-04-06 RX ADMIN — PANTOPRAZOLE SODIUM 40 MILLIGRAM(S): 20 TABLET, DELAYED RELEASE ORAL at 13:26

## 2024-04-06 RX ADMIN — DEXTROSE MONOHYDRATE, SODIUM CHLORIDE, AND POTASSIUM CHLORIDE 100 MILLILITER(S): 50; .745; 4.5 INJECTION, SOLUTION INTRAVENOUS at 21:17

## 2024-04-06 RX ADMIN — Medication 1 MILLIGRAM(S): at 21:16

## 2024-04-06 RX ADMIN — FAMOTIDINE 100 MILLIGRAM(S): 10 INJECTION INTRAVENOUS at 08:07

## 2024-04-06 RX ADMIN — SODIUM CHLORIDE 2000 MILLILITER(S): 9 INJECTION INTRAMUSCULAR; INTRAVENOUS; SUBCUTANEOUS at 07:27

## 2024-04-06 RX ADMIN — ONDANSETRON 4 MILLIGRAM(S): 8 TABLET, FILM COATED ORAL at 07:26

## 2024-04-06 RX ADMIN — DEXTROSE MONOHYDRATE, SODIUM CHLORIDE, AND POTASSIUM CHLORIDE 100 MILLILITER(S): 50; .745; 4.5 INJECTION, SOLUTION INTRAVENOUS at 10:57

## 2024-04-06 NOTE — BH CONSULTATION LIAISON ASSESSMENT NOTE - NSBHMSEAFFQUAL_PSY_A_CORE
-- DO NOT REPLY / DO NOT REPLY ALL --  -- Message is from Engagement Center Operations ([facility]) --    General Patient Message: Patient was seen in the ER Friday 7/28 and put on medications. She still is not feeling well and says she feels sad and depressed. Patient began crying over the phone. She needs to be seen right away so she can stay on top of medications. She is only able to come in on Thursday's after 430 due to transportation. Please call patient to schedule writer unable to find renzo      Caller Information       Type Contact Phone/Fax    07/31/2023 03:25 PM CDT Phone (Incoming) [facility] (Self) [phone] (H)        Alternative phone number: N/A    Can a detailed message be left? Yes    Message Turnaround:     Is it Working Hours? Yes - Working Hours     [place]:    Please give this turnaround time to the caller:   \"This message will be sent to [state Provider's name]. The clinical team will fulfill your request as soon as they review your message.\"                 Anxious

## 2024-04-06 NOTE — ED PROVIDER NOTE - CONSIDERATION OF ADMISSION OBSERVATION
Consideration of Admission/Observation Plan to keep patient in the hospital as she is having continued emesis here in the ER.

## 2024-04-06 NOTE — H&P ADULT - NSHPREVIEWOFSYSTEMS_GEN_ALL_CORE
CONSTITUTIONAL: No fever, weight loss, or fatigue  EYES: No eye pain, visual disturbances, or discharge  ENMT:  No difficulty hearing, tinnitus, vertigo; No sinus or throat pain  NECK: No pain or stiffness  RESPIRATORY: No cough, wheezing, chills or hemoptysis; No shortness of breath  CARDIOVASCULAR: No chest pain, palpitations, dizziness, or leg swelling  GASTROINTESTINAL:  No  hematemesis; No diarrhea or constipation. No melena or hematochezia.  GENITOURINARY: No dysuria, frequency, hematuria, or incontinence  NEUROLOGICAL: No headaches, memory loss, loss of strength, numbness, or tremors  SKIN: No itching, burning, rashes, or lesions   MUSCULOSKELETAL: No joint pain or swelling; No muscle, back, or extremity pain  PSYCHIATRIC: No depression, anxiety, mood swings, or difficulty sleeping  ALLERGY AND IMMUNOLOGIC: No hives or eczema    ALL ROS REVIEWED AND NORMAL EXCEPT AS STATED ABOVE

## 2024-04-06 NOTE — ED ADULT NURSE NOTE - NSFALLUNIVINTERV_ED_ALL_ED
(3) Not Aware of Limitations
show
Bed/Stretcher in lowest position, wheels locked, appropriate side rails in place/Call bell, personal items and telephone in reach/Instruct patient to call for assistance before getting out of bed/chair/stretcher/Non-slip footwear applied when patient is off stretcher/[place] to call system/Physically safe environment - no spills, clutter or unnecessary equipment/Purposeful proactive rounding/Room/bathroom lighting operational, light cord in reach

## 2024-04-06 NOTE — ED PROVIDER NOTE - NS ED ROS FT
Review of Systems:  	•	CONSTITUTIONAL - no fever, no diaphoresis, no weight change  	•	SKIN - no rash  	•	HEMATOLOGIC - no bleeding, no bruising  	•	EYES - no eye pain, no blurred vision  	•	ENT - no change in hearing, no pain  	•	RESPIRATORY - no shortness of breath, no cough  	•	CARDIAC - no chest pain, no palpitations  	•	GI - no abd pain, +nausea, +vomiting, no diarrhea, no constipation, no bleeding  	•	GENITO-URINARY -no dysuria; no hematuria   	•	MUSCULOSKELETAL - no joint paint, no swelling, no redness  	•	NEUROLOGIC - no weakness, no headache, no anesthesia, no paresthesias  	•	PSYCH - no anxiety, non suicidal, non homicidal, no hallucination, no depression

## 2024-04-06 NOTE — ED PROVIDER NOTE - PROGRESS NOTE DETAILS
Patient with recurrent episodes of emesis, failed p.o. challenge, will  administer additional Zofran at this time and place an inpatient. Will admit patient to medicine on Dr. Smith's service.

## 2024-04-06 NOTE — H&P ADULT - NSHPPHYSICALEXAM_GEN_ALL_CORE
T(C): 37.2 (04-06-24 @ 09:55), Max: 37.2 (04-06-24 @ 09:55)  HR: 72 (04-06-24 @ 09:55) (72 - 124)  BP: 118/85 (04-06-24 @ 09:55) (118/85 - 142/82)  RR: 17 (04-06-24 @ 09:55) (17 - 18)  SpO2: 99% (04-06-24 @ 09:55) (92% - 100%)  Wt(kg): --Vital Signs Last 24 Hrs  T(C): 37.2 (06 Apr 2024 09:55), Max: 37.2 (06 Apr 2024 09:55)  T(F): 98.9 (06 Apr 2024 09:55), Max: 98.9 (06 Apr 2024 09:55)  HR: 72 (06 Apr 2024 09:55) (72 - 124)  BP: 118/85 (06 Apr 2024 09:55) (118/85 - 142/82)  BP(mean): --  RR: 17 (06 Apr 2024 09:55) (17 - 18)  SpO2: 99% (06 Apr 2024 09:55) (92% - 100%)    Parameters below as of 06 Apr 2024 09:55  Patient On (Oxygen Delivery Method): room air        PHYSICAL EXAM:  GENERAL: NAD  HENT:  Atraumatic, Normocephalic; No tonsillar erythema, exudates, or enlargement; Moist mucous membranes;   EYES: EOMI, PERRLA, conjunctiva and sclera clear, no lid-lag  NECK: Supple, No JVD, Normal thyroid  NERVOUS SYSTEM:  CN II - XII intact; Sensation intact; Motor Strength 5/5 B/L upper and lower extremities. Alert and oriented x 3  CHEST/LUNG:  No rales, rhonchi, wheezing, or rubs; normal respiratory effort, no intercostal retractions; No pitting edema  HEART: Regular rate and rhythm; No murmurs, rubs, or gallops  ABDOMEN: Soft, Nontender, Nondistended; Bowel sounds present; No HSM  MUSCULOSKELETAL/EXTREMITIES:  2+ Peripheral Pulses, No clubbing, or digital cyanosis  Examination of the joints, bones, and muscles of one or more of the following six areas:  1. head and neck 2. spine, ribs, and pelvis 3. right upper extremity 4. left upper extremity 5. right lower extremity 6. left lower extremity   ROM (pain, crepitation or contracture)  SKIN: No rashes or lesions; normal texture and temperature  PSYCH: Appropriate affect

## 2024-04-06 NOTE — BH CONSULTATION LIAISON ASSESSMENT NOTE - SUMMARY
37yo female with PMHx of kidney stone, gastritis, anxiety, cyclical vomiting presents to the ED with recurrent episodes of vomiting. Pt states that around 4am had recurrence of severe nausea and vomiting. Vomitus was non-bloody. Afterwards had continued dry heaving. Came to the ED for further evaluation. Denies any recent alcohol use. Took a marijuana gummy last evening to help with her anxiety and nausea. After that did not work, she phoned her psychiatrist who recommended her trazodone. Pt took trazodone 50mg and went to bed. Woke up with current symptoms.   Patient is suffering from Anxiety disorder , that manifests in nausea and vomiting. She had good response to symptoms in the  past  with ativan .

## 2024-04-06 NOTE — H&P ADULT - HISTORY OF PRESENT ILLNESS
37yo female with PMHx of kidney stone, gastritis, anxiety, cyclical vomiting presents to the ED with recurrent episodes of vomiting. Pt states that around 4am had recurrence of severe nausea and vomiting. Vomitus was non-bloody. Afterwards had continued dry heaving. Came to the ED for further evaluation. Denies any recent alcohol use. Took a marijuana gummy last evening to help with her anxiety and nausea. After that did not work, she phoned her psychiatrist who recommended her trazodone. Pt took trazodone 50mg and went to bed. Woke up with current symptoms.     Of note, was recently in the  ED for similar symptoms on 4/2/24.

## 2024-04-06 NOTE — ED PROVIDER NOTE - NSFOLLOWUPINSTRUCTIONS_ED_ALL_ED_FT
English    Cyclic Vomiting Syndrome, Adult  Cyclic vomiting syndrome (CVS) is a condition that causes episodes of severe nausea and vomiting. It can last for hours or even days. Attacks may occur several times a month or several times a year. Between episodes of CVS, you may be otherwise healthy.    What are the causes?  The cause of this condition is not known. Although many of the episodes can happen for no obvious reason, you may have specific CVS triggers. Episodes may be triggered by:  An infection, especially colds and the flu.  Emotional stress, including excitement or anxiety about finances, relationships, or moving.  Certain foods or beverages, such as chocolate, cheese, alcohol, and food additives.  Food allergies.  Motion sickness.  Eating a large meal before bed.  Being very tired.  Being overheated.  Menstruation.  Long-term cannabis use.  What increases the risk?  You are more likely to develop this condition if:  You get migraine headaches.  You have a family history of CVS or migraine headaches.  What are the signs or symptoms?  Symptoms tend to happen at the same time of day, and each episode tends to last about the same amount of time. Symptoms commonly start at night or when you wake up. Many people have warning signs (prodrome) before an episode, which may include slight nausea, sweating, and pale skin (pallor).    The most common symptoms of a CVS attack include:  Severe vomiting. Vomiting may happen every 5–15 minutes.  Severe nausea.  Gagging (retching).  Other symptoms may include:  Headache.  Dizziness.  Sensitivity to light or sound.  Abdominal pain. This can be severe.  Loose stools or diarrhea.  Weakness or exhaustion.  Dehydration. This can cause:  Thirst.  Dry mouth.  Decreased urination.  Fatigue.  How is this diagnosed?  This condition may be diagnosed based on your symptoms, medical history, and family history of CVS or migraine. Your health care provider will ask whether you have had:  Episodes of severe nausea and vomiting that have happened a total of 5 or more times, or 3 or more times in the past 6 months.  Episodes that last for 1 hour or more, and occur 1 week apart or further apart.  Episodes that are similar each time.  Normal health between episodes.  Your health care provider will also do a physical exam. To rule out other conditions, you may have tests, such as:  Blood tests.  Urine tests.  Imaging tests.  How is this treated?  A prescription pill bottle with an example of a pill.  There is no cure for this condition, but treatment can help manage or prevent CVS episodes. Work with your health care provider to find the best treatment for you. Treatment may include:  Avoiding stress and CVS triggers.  Eating smaller, more frequent meals.  Taking medicines, such as:  Anti-nausea medicines.  Antacids.  Antidepressants.  Antihistamines.  Medicines for migraines.  Over-the-counter pain medicine.  Over-the counter diet supplements.  Severe nausea and vomiting may require you to stay at the hospital. You may need IV fluids to prevent or treat dehydration.    Follow these instructions at home:  During an episode    Take over-the-counter and prescription medicines only as told by your health care provider.  Stay in bed and rest in a dark, quiet room.  After an episode    Illustration of a person drinking a glass of water.  Drink an oral rehydration solution (ORS), if directed by your health care provider. This is a drink that helps you replace fluids and the salts and minerals in your blood (electrolytes). It can be found at pharmacies and retail stores.  Drink small amounts of clear fluids slowly and gradually add more.  Drink clear fluids such as water or fruit juice that has water added (is diluted). You may also eat low-calorie popsicles.  Avoid drinking fluids that contain a lot of sugar or caffeine, such as sports drinks and soda.  Eat soft foods in small amounts every 3–4 hours. Eat your regular diet, but avoid spicy or fatty foods, such as french fries and pizza.  General instructions    Monitor your condition for any changes.  Keep track of your attacks and symptoms, and pay attention to any triggers. Avoid those triggers when you can.  If you use cannabis, stop using it right away. Ending cannabis use can reduce or even stop your cyclic vomiting syndrome.  Keep all follow-up visits. This is important.  Where to find more information  Cyclic Vomiting Syndrome Association: cvsaonline.org  Contact a health care provider if:  Your condition gets worse.  You cannot drink fluids without vomiting.  You have pain and trouble swallowing after an episode.  Get help right away if:  You have blood in your vomit.  Your vomit looks like coffee grounds.  You have stools that are bloody or black, or stools that look like tar.  You have signs of dehydration, such as:  Sunken eyes.  Not making tears while crying.  Very dry mouth or cracked lips.  Decreased urine production.  Dark urine. Urine may be the color of tea.  Weakness.  Sleepiness.  These symptoms may be an emergency. Get help right away. Call 911.  Do not wait to see if the symptoms will go away.  Do not drive yourself to the hospital.  Summary  Cyclic vomiting syndrome (CVS) causes episodes of severe nausea and vomiting that can last for hours or even days.  Treatment can help you manage or prevent CVS episodes. Work with your health care provider to find the best treatment for you.  Vomiting and diarrhea can make you feel weak and can lead to dehydration. If you notice signs of dehydration, call your health care provider right away.  Keep all follow-up visits This is important.  This information is not intended to replace advice given to you by your health care provider. Make sure you discuss any questions you have with your health care provider.    Document Revised: 07/27/2022 Document Reviewed: 07/27/2022  [employer] Patient Education © 2024 [employer]  [employer] logo  Terms and Conditions  Privacy Policy  Editorial Policy  All content on this site: Copyright © 2024 [employer], its licensors, and contributors. All rights are reserved, including those for text and data mining, AI training, and similar technologies. For all open access content, the Creative Commons licensing terms apply.  Cookies are used by this site. To decline or learn more, visit our Cookies page.  [employer]

## 2024-04-06 NOTE — BH CONSULTATION LIAISON ASSESSMENT NOTE - PATIENT'S CHIEF COMPLAINT
I have this problem for a long time and only ativan was helpful, but my psychiatrist is not giving it ti me,

## 2024-04-06 NOTE — BH CONSULTATION LIAISON ASSESSMENT NOTE - HPI (INCLUDE ILLNESS QUALITY, SEVERITY, DURATION, TIMING, CONTEXT, MODIFYING FACTORS, ASSOCIATED SIGNS AND SYMPTOMS)
39yo female with PMHx of kidney stone, gastritis, anxiety, cyclical vomiting presents to the ED with recurrent episodes of vomiting. Pt states that around 4am had recurrence of severe nausea and vomiting. Vomitus was non-bloody. Afterwards had continued dry heaving. Came to the ED for further evaluation. Denies any recent alcohol use. Took a marijuana gummy last evening to help with her anxiety and nausea. After that did not work, she phoned her psychiatrist who recommended her trazodone. Pt took trazodone 50mg and went to bed. Woke up with current symptoms.     Of note, was recently in the  ED for similar symptoms on 4/2/24. Patient seen in the presence of her . Patient   works 5 d a week  , has 2 children , seen psychiatrist DR Lau for 8 month , feels that not beeing helped, ." I am not an addictive person, have no drugs or alcohol  problems and  only ativan was helpful ,but  does not want to give it to me"

## 2024-04-06 NOTE — BH CONSULTATION LIAISON ASSESSMENT NOTE - NSBHCHARTREVIEWVS_PSY_A_CORE FT
Vital Signs Last 24 Hrs  T(C): 36.7 (06 Apr 2024 10:03), Max: 37.2 (06 Apr 2024 09:55)  T(F): 98.1 (06 Apr 2024 10:03), Max: 98.9 (06 Apr 2024 09:55)  HR: 70 (06 Apr 2024 10:03) (70 - 124)  BP: 151/94 (06 Apr 2024 10:03) (118/85 - 151/94)  BP(mean): --  RR: 18 (06 Apr 2024 10:03) (17 - 18)  SpO2: 98% (06 Apr 2024 10:03) (92% - 100%)    Parameters below as of 06 Apr 2024 10:03  Patient On (Oxygen Delivery Method): room air

## 2024-04-06 NOTE — ED PROVIDER NOTE - CLINICAL SUMMARY MEDICAL DECISION MAKING FREE TEXT BOX
This patient with nausea and vomiting which is likely secondary to  exacerbation of cyclical vomiting syndrome. Considered but low risk for SBO (normal BM, passing flatus, no abdominal surgeries), no signs of DKA in labs. Patient BMP with normal electrolytes and no sign of dehydration causing prerenal KOLBY. Low suspicion for gastric or esophageal dysmotility as cause. Patient with no chest pain. Based on history, exam, and work up low suspicion for pancreatitis, appendicitis, biliary pathology, or other emergent problem. Patient given zofran and Ativan tolerated PO here. Patient to be discharged with zofran and to follow up with PMD.

## 2024-04-06 NOTE — ED PROVIDER NOTE - PHYSICAL EXAMINATION
ATTENDING PHYSICAL EXAM DR. MÉNDEZ ***GEN -   Mild distress and is yelling crying and screaming  otherwise well appearing; A+O x3 ***HEAD - NC/AT ***EYES/NOSE - PERRL, EOMI, mucous membranes moist, no discharge ***THROAT: Oral cavity and pharynx normal. No inflammation, swelling, exudate, or lesions.  ***NECK: Neck supple, non-tender without lymphadenopathy, no masses, no thyromegaly.   ***PULMONARY - CTA b/l, symmetric breath sounds. ***CARDIAC -s1s2, RRR, no M,G,R  ***ABDOMEN - +BS, ND, NT, soft, no guarding, no rebound, no masses   ***BACK - no CVA tenderness, Normal  spine ***EXTREMITIES - symmetric pulses, 2+ dp, capillary refill < 2 seconds, no clubbing, no cyanosis, no edema ***SKIN - no rash or bruising   ***NEUROLOGIC - alert

## 2024-04-06 NOTE — H&P ADULT - NSICDXPASTMEDICALHX_GEN_ALL_CORE_FT
PAST MEDICAL HISTORY:  Anxiety     Cyclical vomiting     Gastritis     Kidney stone     Urinary tract infection without hematuria, site unspecified frequent reoccuring UTIs as per patient

## 2024-04-06 NOTE — BH CONSULTATION LIAISON ASSESSMENT NOTE - CURRENT MEDICATION
MEDICATIONS  (STANDING):  dextrose 5% + sodium chloride 0.45% with potassium chloride 20 mEq/L 1000 milliLiter(s) (100 mL/Hr) IV Continuous <Continuous>  LORazepam     Tablet 0.5 milliGRAM(s) Oral two times a day  LORazepam     Tablet 1 milliGRAM(s) Oral at bedtime  pantoprazole  Injectable 40 milliGRAM(s) IV Push daily  traZODone 50 milliGRAM(s) Oral at bedtime    MEDICATIONS  (PRN):  acetaminophen     Tablet .. 650 milliGRAM(s) Oral every 6 hours PRN Mild Pain (1 - 3), Moderate Pain (4 - 6), Severe Pain (7 - 10)  LORazepam   Injectable 0.5 milliGRAM(s) IV Push every 8 hours PRN Anxiety  melatonin 3 milliGRAM(s) Oral at bedtime PRN Insomnia  ondansetron Injectable 4 milliGRAM(s) IV Push every 4 hours PRN Nausea and/or Vomiting

## 2024-04-06 NOTE — H&P ADULT - ASSESSMENT
37yo female with PMHx of kidney stone, gastritis, anxiety, cyclical vomiting being admitted with intractable vomiting.     #nausea/vomiting  #cyclical vomiting syndrome  --likely 2/2 marijuana use  -IVFs  -zofran PRN  -PPI  -clear liquids for now  -control underlying anxiety that seems to be the trigger  -f/u am labs    #hypokalemia  -K in IVFs  -monitor on am labs  -likely 2/2 poor PO intake and vomiting    #anxiety  -psych  consult  -continue trazodone  -ativan PRN    Full Code     Delbert at bedside, all questions answered    Pt will need referral to establish with PCP upon discharge.  39yo female with PMHx of kidney stone, gastritis, anxiety, cyclical vomiting being admitted with intractable vomiting.     #nausea/vomiting  #cyclical vomiting syndrome  --likely 2/2 marijuana use  -IVFs  -zofran PRN  -PPI  -clear liquids for now  -control underlying anxiety that seems to be the trigger  -f/u am labs    #hypokalemia  -K in IVFs  -monitor on am labs  -likely 2/2 poor PO intake and vomiting    #anxiety  -psych consult, d/w Dr. Bettencourt. Will see patient later today/  -continue trazodone  -ativan PRN    Full Code     Delbert at bedside, all questions answered    Pt will need referral to establish with PCP upon discharge.  37yo female with PMHx of kidney stone, gastritis, anxiety, cyclical vomiting being admitted with intractable vomiting.     #nausea/vomiting  #cyclical vomiting syndrome  --likely 2/2 marijuana use  -IVFs  -zofran PRN  -PPI  -clear liquids for now  -control underlying anxiety that seems to be the trigger  -f/u am labs    #hypokalemia  -K in IVFs  -monitor on am labs  -likely 2/2 poor PO intake and vomiting    #anxiety - uncontrolled  -psych consult, d/w Dr. Bettencourt. Will see patient later today.  -continue trazodone  -ativan PRN    Full Code     Delbert at bedside, all questions answered    Pt will need referral to establish with PCP upon discharge.  39yo female with PMHx of kidney stone, gastritis, anxiety, cyclical vomiting being admitted with intractable vomiting.     #nausea/vomiting  #cyclical vomiting syndrome  --likely 2/2 marijuana use  -IVFs  -zofran PRN  -PPI  -clear liquids for now  -control underlying anxiety that seems to be the trigger  -f/u am labs  -Utox ordered, +THC.    #hypokalemia  -K in IVFs  -monitor on am labs  -likely 2/2 poor PO intake and vomiting    #anxiety - uncontrolled  -psych consult, d/w Dr. Bettencourt. Will see patient later today.  -continue trazodone  -ativan PRN    Full Code     Delbert at bedside, all questions answered    Pt will need referral to establish with PCP upon discharge.  37yo female with PMHx of kidney stone, gastritis, anxiety, cyclical vomiting being admitted with intractable vomiting.     #nausea/vomiting  #cyclical vomiting syndrome  --likely 2/2 marijuana use  -IVFs  -zofran PRN  -PPI  -clear liquids for now  -control underlying anxiety that seems to be the trigger  -f/u am labs  -Utox ordered, +THC.    #hypokalemia  -K in IVFs  -monitor on am labs  -likely 2/2 poor PO intake and vomiting    #anxiety - uncontrolled  -psych consult, d/w Dr. Bettencourt. Will see patient later today.  -continue trazodone  -ativan PRN    Full Code     Delbert at bedside, all questions answered    Pt will need to be given new PCP upon discharge.

## 2024-04-06 NOTE — ED PROVIDER NOTE - OBJECTIVE STATEMENT
38-year-old female presents to the emergency department with exacerbation of cyclical vomiting syndrome.  Patient states that she has had multiple episodes of similar presentations in the past.  Today's presentation is no different from her usual vomiting.  She is essentially dry heaving without much emesis.    Emesis was nonbloody in nature.  No fever no chills no abdominal pain last bowel movement yesterday, no diarrhea no black or bloody stools.  The patient states her psychiatrist manages her cyclical vomiting.  She states she has an intolerance to Haldol and Reglan.  She is requesting Ativan for her symptoms as that is the only thing that will help her.

## 2024-04-06 NOTE — H&P ADULT - NSHPLABSRESULTS_GEN_ALL_CORE
LABS:                        13.9   8.82  )-----------( 428      ( 06 Apr 2024 07:30 )             41.8     04-06    142  |  105  |  12  ----------------------------<  120<H>  3.3<L>   |  24  |  0.39<L>    Ca    8.9      06 Apr 2024 07:30    TPro  7.7  /  Alb  3.8  /  TBili  0.5  /  DBili  x   /  AST  15  /  ALT  18  /  AlkPhos  62  04-06      Urinalysis Basic - ( 06 Apr 2024 07:30 )    Color: x / Appearance: x / SG: x / pH: x  Gluc: 120 mg/dL / Ketone: x  / Bili: x / Urobili: x   Blood: x / Protein: x / Nitrite: x   Leuk Esterase: x / RBC: x / WBC x   Sq Epi: x / Non Sq Epi: x / Bacteria: x       CAPILLARY BLOOD GLUCOSE            Urinalysis Basic - ( 06 Apr 2024 07:30 )    Color: x / Appearance: x / SG: x / pH: x  Gluc: 120 mg/dL / Ketone: x  / Bili: x / Urobili: x   Blood: x / Protein: x / Nitrite: x   Leuk Esterase: x / RBC: x / WBC x   Sq Epi: x / Non Sq Epi: x / Bacteria: x        RADIOLOGY & ADDITIONAL TESTS:    Consultant(s) Notes Reviewed:  [x ] YES  [ ] NO  Care Discussed with Consultants/Other Providers [ x] YES  [ ] NO  Imaging Personally Reviewed:  [x ] YES  [ ] NO

## 2024-04-06 NOTE — ED ADULT NURSE NOTE - OBJECTIVE STATEMENT
Pt presents to ED with c/o cyclical vomiting.  Hx of cyclical vomiting, reports exacerbation of symptoms.  Pt reports similar symptoms to past episodes.  On arrival, pt is anxious, dry heaving with some nonbloody emesis.  Pt denies any abdominal pain or discomfort.  Denies any fevers, chills. Pt presents to ED with c/o cyclical vomiting.  Hx of cyclical vomiting, reports exacerbation of symptoms.  Pt reports similar symptoms to past episodes.  On arrival, pt is anxious, writhing in bed, and dry heaving with minimal nonbloody emesis.  Pt denies any abdominal pain or discomfort.  Denies any fevers, chills, urinary complaints.  Reports use of edible gummies.

## 2024-04-06 NOTE — PATIENT PROFILE ADULT - FALL HARM RISK - RISK INTERVENTIONS

## 2024-04-07 LAB
ANION GAP SERPL CALC-SCNC: 9 MMOL/L — SIGNIFICANT CHANGE UP (ref 5–17)
BUN SERPL-MCNC: 3 MG/DL — LOW (ref 7–23)
CALCIUM SERPL-MCNC: 8.3 MG/DL — LOW (ref 8.4–10.5)
CHLORIDE SERPL-SCNC: 108 MMOL/L — SIGNIFICANT CHANGE UP (ref 96–108)
CO2 SERPL-SCNC: 25 MMOL/L — SIGNIFICANT CHANGE UP (ref 22–31)
CREAT SERPL-MCNC: 0.71 MG/DL — SIGNIFICANT CHANGE UP (ref 0.5–1.3)
EGFR: 111 ML/MIN/1.73M2 — SIGNIFICANT CHANGE UP
GLUCOSE SERPL-MCNC: 116 MG/DL — HIGH (ref 70–99)
HCT VFR BLD CALC: 37.1 % — SIGNIFICANT CHANGE UP (ref 34.5–45)
HGB BLD-MCNC: 12.1 G/DL — SIGNIFICANT CHANGE UP (ref 11.5–15.5)
MAGNESIUM SERPL-MCNC: 1.8 MG/DL — SIGNIFICANT CHANGE UP (ref 1.6–2.6)
MCHC RBC-ENTMCNC: 28.5 PG — SIGNIFICANT CHANGE UP (ref 27–34)
MCHC RBC-ENTMCNC: 32.6 GM/DL — SIGNIFICANT CHANGE UP (ref 32–36)
MCV RBC AUTO: 87.5 FL — SIGNIFICANT CHANGE UP (ref 80–100)
NRBC # BLD: 0 /100 WBCS — SIGNIFICANT CHANGE UP (ref 0–0)
PLATELET # BLD AUTO: 382 K/UL — SIGNIFICANT CHANGE UP (ref 150–400)
POTASSIUM SERPL-MCNC: 3 MMOL/L — LOW (ref 3.5–5.3)
POTASSIUM SERPL-SCNC: 3 MMOL/L — LOW (ref 3.5–5.3)
RBC # BLD: 4.24 M/UL — SIGNIFICANT CHANGE UP (ref 3.8–5.2)
RBC # FLD: 13.5 % — SIGNIFICANT CHANGE UP (ref 10.3–14.5)
SODIUM SERPL-SCNC: 142 MMOL/L — SIGNIFICANT CHANGE UP (ref 135–145)
WBC # BLD: 7.78 K/UL — SIGNIFICANT CHANGE UP (ref 3.8–10.5)
WBC # FLD AUTO: 7.78 K/UL — SIGNIFICANT CHANGE UP (ref 3.8–10.5)

## 2024-04-07 PROCEDURE — 99233 SBSQ HOSP IP/OBS HIGH 50: CPT

## 2024-04-07 RX ORDER — IBUPROFEN 200 MG
400 TABLET ORAL EVERY 6 HOURS
Refills: 0 | Status: DISCONTINUED | OUTPATIENT
Start: 2024-04-07 | End: 2024-04-09

## 2024-04-07 RX ORDER — PANTOPRAZOLE SODIUM 20 MG/1
40 TABLET, DELAYED RELEASE ORAL
Refills: 0 | Status: DISCONTINUED | OUTPATIENT
Start: 2024-04-07 | End: 2024-04-09

## 2024-04-07 RX ORDER — POTASSIUM CHLORIDE 20 MEQ
40 PACKET (EA) ORAL EVERY 4 HOURS
Refills: 0 | Status: COMPLETED | OUTPATIENT
Start: 2024-04-07 | End: 2024-04-07

## 2024-04-07 RX ORDER — ACETAMINOPHEN 500 MG
650 TABLET ORAL EVERY 6 HOURS
Refills: 0 | Status: DISCONTINUED | OUTPATIENT
Start: 2024-04-07 | End: 2024-04-09

## 2024-04-07 RX ADMIN — Medication 0.5 MILLIGRAM(S): at 08:14

## 2024-04-07 RX ADMIN — Medication 40 MILLIEQUIVALENT(S): at 12:26

## 2024-04-07 RX ADMIN — Medication 650 MILLIGRAM(S): at 05:02

## 2024-04-07 RX ADMIN — DEXTROSE MONOHYDRATE, SODIUM CHLORIDE, AND POTASSIUM CHLORIDE 100 MILLILITER(S): 50; .745; 4.5 INJECTION, SOLUTION INTRAVENOUS at 22:32

## 2024-04-07 RX ADMIN — Medication 650 MILLIGRAM(S): at 05:59

## 2024-04-07 RX ADMIN — Medication 0.5 MILLIGRAM(S): at 16:28

## 2024-04-07 RX ADMIN — Medication 0.5 MILLIGRAM(S): at 05:01

## 2024-04-07 RX ADMIN — Medication 1 MILLIGRAM(S): at 22:20

## 2024-04-07 RX ADMIN — Medication 40 MILLIEQUIVALENT(S): at 08:14

## 2024-04-07 RX ADMIN — DEXTROSE MONOHYDRATE, SODIUM CHLORIDE, AND POTASSIUM CHLORIDE 100 MILLILITER(S): 50; .745; 4.5 INJECTION, SOLUTION INTRAVENOUS at 08:26

## 2024-04-07 RX ADMIN — ONDANSETRON 4 MILLIGRAM(S): 8 TABLET, FILM COATED ORAL at 05:01

## 2024-04-07 NOTE — DIETITIAN INITIAL EVALUATION ADULT - ORAL INTAKE PTA/DIET HISTORY
Pt reports she normally follows a healthy diet but in the past week has been having nausea/vomiting with suboptimal PO intake/appetite

## 2024-04-07 NOTE — DIETITIAN INITIAL EVALUATION ADULT - PERTINENT MEDS FT
MEDICATIONS  (STANDING):  dextrose 5% + sodium chloride 0.45% with potassium chloride 20 mEq/L 1000 milliLiter(s) (100 mL/Hr) IV Continuous <Continuous>  LORazepam     Tablet 1 milliGRAM(s) Oral at bedtime  LORazepam     Tablet 0.5 milliGRAM(s) Oral <User Schedule>  pantoprazole    Tablet 40 milliGRAM(s) Oral before breakfast  potassium chloride    Tablet ER 40 milliEquivalent(s) Oral every 4 hours    MEDICATIONS  (PRN):  acetaminophen     Tablet .. 650 milliGRAM(s) Oral every 6 hours PRN Mild Pain (1 - 3)  ibuprofen  Tablet. 400 milliGRAM(s) Oral every 6 hours PRN Moderate Pain (4 - 6)  LORazepam   Injectable 0.5 milliGRAM(s) IV Push every 8 hours PRN Anxiety  melatonin 3 milliGRAM(s) Oral at bedtime PRN Insomnia  ondansetron Injectable 4 milliGRAM(s) IV Push every 4 hours PRN Nausea and/or Vomiting

## 2024-04-07 NOTE — DIETITIAN INITIAL EVALUATION ADULT - PERTINENT LABORATORY DATA
04-07    142  |  108  |  3<L>  ----------------------------<  116<H>  3.0<L>   |  25  |  0.71    Ca    8.3<L>      07 Apr 2024 06:00  Mg     1.8     04-07    TPro  7.7  /  Alb  3.8  /  TBili  0.5  /  DBili  x   /  AST  15  /  ALT  18  /  AlkPhos  62  04-06

## 2024-04-07 NOTE — BH CONSULTATION LIAISON PROGRESS NOTE - CURRENT MEDICATION
MEDICATIONS  (STANDING):  dextrose 5% + sodium chloride 0.45% with potassium chloride 20 mEq/L 1000 milliLiter(s) (100 mL/Hr) IV Continuous <Continuous>  LORazepam     Tablet 0.5 milliGRAM(s) Oral <User Schedule>  LORazepam     Tablet 1 milliGRAM(s) Oral at bedtime  pantoprazole    Tablet 40 milliGRAM(s) Oral before breakfast    MEDICATIONS  (PRN):  acetaminophen     Tablet .. 650 milliGRAM(s) Oral every 6 hours PRN Mild Pain (1 - 3)  ibuprofen  Tablet. 400 milliGRAM(s) Oral every 6 hours PRN Moderate Pain (4 - 6)  LORazepam   Injectable 0.5 milliGRAM(s) IV Push every 8 hours PRN Anxiety  melatonin 3 milliGRAM(s) Oral at bedtime PRN Insomnia  ondansetron Injectable 4 milliGRAM(s) IV Push every 4 hours PRN Nausea and/or Vomiting

## 2024-04-07 NOTE — DIETITIAN INITIAL EVALUATION ADULT - OTHER INFO
37 y/o F with PMHx of kidney stone, gastritis, anxiety, cyclical vomiting being admitted with intractable vomiting.     Pt tolerated clear liquids, reports that her symptoms have improved and she is feeling slightly better + feels hungry today. Recommend advancing diet to regular, pt feels she can tolerate solid foods. Discussed w/ MD. Pt reports UBW of 135 lbs. Current weight is 134 lbs. No physical signs of muscle wasting or fat loss observed. Pt denies constipation/diarrhea. Allergy to tomato noted.

## 2024-04-07 NOTE — BH CONSULTATION LIAISON PROGRESS NOTE - NSBHFUPINTERVALHXFT_PSY_A_CORE
39yo female with PMHx of kidney stone, gastritis, anxiety, cyclical vomiting presents to the ED with recurrent episodes of vomiting. Pt states that around 4am had recurrence of severe nausea and vomiting. Vomitus was non-bloody. Afterwards had continued dry heaving. Came to the ED for further evaluation. Denies any recent alcohol use. Took a marijuana gummy last evening to help with her anxiety and nausea. After that did not work, she phoned her psychiatrist who recommended her trazodone. Pt took trazodone 50mg and went to bed. Woke up with current symptoms.     She was recently in the  ED for similar symptoms on 4/2/24. Patient seen  today in her bed, . pleasant , smiling , feeling calm and  happy , that slept last night.

## 2024-04-07 NOTE — BH CONSULTATION LIAISON PROGRESS NOTE - NSBHCHARTREVIEWVS_PSY_A_CORE FT
Pt filled out release of info form. Work note was given to pt.     Release form in scan bin Vital Signs Last 24 Hrs  T(C): 36.7 (07 Apr 2024 12:53), Max: 36.9 (07 Apr 2024 05:33)  T(F): 98 (07 Apr 2024 12:53), Max: 98.4 (07 Apr 2024 05:33)  HR: 94 (07 Apr 2024 12:53) (91 - 94)  BP: 110/77 (07 Apr 2024 12:53) (101/74 - 110/77)  BP(mean): --  RR: 16 (07 Apr 2024 12:53) (14 - 16)  SpO2: 98% (07 Apr 2024 12:53) (97% - 98%)    Parameters below as of 07 Apr 2024 12:53  Patient On (Oxygen Delivery Method): room air

## 2024-04-07 NOTE — PROGRESS NOTE ADULT - ASSESSMENT
39 y/o F with PMHx of kidney stone, gastritis, anxiety, cyclical vomiting being admitted with intractable vomiting.     #Cyclical vomiting syndrome  -Likely 2/2 marijuana use  -Continue IVF  -CLD, advance as tolerated  -Zofran PRN  -PPI  -Utox +THC  -Psych appreciated    #Hypokalemia  -Replete and monitor    #Anxiety - uncontrolled  -Psych appreciated - start ativan 0.5mg BID and 1 mg qhs   -Continue trazodone  -Ativan PRN    Full Code  Patient prefers to update  Delbert    Pt will need to be given new PCP upon discharge    Anticipate discharge 4/8 pending improvement in PO intake

## 2024-04-07 NOTE — PROGRESS NOTE ADULT - SUBJECTIVE AND OBJECTIVE BOX
Patient is a 38y old  Female who presents with a chief complaint of vomiting (06 Apr 2024 09:22)      Patient seen and examined at bedside. endorses nausea, vomiting, anxiety improved since admission. trying to increase PO hydration. denies headache, fever, chills, cp, sob, abd pain.      ALLERGIES:  latex (Unknown)  No Known Drug Allergies  Haldol (Urticaria)  Reglan (Unknown)    MEDICATIONS  (STANDING):  dextrose 5% + sodium chloride 0.45% with potassium chloride 20 mEq/L 1000 milliLiter(s) (100 mL/Hr) IV Continuous <Continuous>  LORazepam     Tablet 1 milliGRAM(s) Oral at bedtime  LORazepam     Tablet 0.5 milliGRAM(s) Oral <User Schedule>  pantoprazole  Injectable 40 milliGRAM(s) IV Push daily  potassium chloride    Tablet ER 40 milliEquivalent(s) Oral every 4 hours    MEDICATIONS  (PRN):  acetaminophen     Tablet .. 650 milliGRAM(s) Oral every 6 hours PRN Mild Pain (1 - 3), Moderate Pain (4 - 6), Severe Pain (7 - 10)  LORazepam   Injectable 0.5 milliGRAM(s) IV Push every 8 hours PRN Anxiety  melatonin 3 milliGRAM(s) Oral at bedtime PRN Insomnia  ondansetron Injectable 4 milliGRAM(s) IV Push every 4 hours PRN Nausea and/or Vomiting    Vital Signs Last 24 Hrs  T(F): 98.4 (07 Apr 2024 05:33), Max: 98.9 (06 Apr 2024 09:55)  HR: 92 (07 Apr 2024 05:33) (70 - 92)  BP: 101/74 (07 Apr 2024 05:33) (101/74 - 151/94)  RR: 14 (07 Apr 2024 05:33) (14 - 18)  SpO2: 97% (07 Apr 2024 05:33) (97% - 99%)  I&O's Summary    06 Apr 2024 07:01  -  07 Apr 2024 07:00  --------------------------------------------------------  IN: 1100 mL / OUT: 0 mL / NET: 1100 mL      BMI (kg/m2): 22.5 (04-06-24 @ 06:55)  PHYSICAL EXAM:  General: NAD, A/O x 3  ENT: MMM, no scleral icterus  Neck: Supple, No JVD  Lungs: Clear to auscultation bilaterally, no wheezes, rales, rhonchi  Cardio: RRR, S1/S2  Abdomen: Soft, Nontender, Nondistended; Bowel sounds present  Extremities: No calf tenderness, No pitting edema    LABS:                        12.1   7.78  )-----------( 382      ( 07 Apr 2024 06:00 )             37.1       04-07    142  |  108  |  3   ----------------------------<  116  3.0   |  25  |  0.71    Ca    8.3      07 Apr 2024 06:00  Mg     1.8     04-07    TPro  7.7  /  Alb  3.8  /  TBili  0.5  /  DBili  x   /  AST  15  /  ALT  18  /  AlkPhos  62  04-06                                  Urinalysis Basic - ( 07 Apr 2024 06:00 )    Color: x / Appearance: x / SG: x / pH: x  Gluc: 116 mg/dL / Ketone: x  / Bili: x / Urobili: x   Blood: x / Protein: x / Nitrite: x   Leuk Esterase: x / RBC: x / WBC x   Sq Epi: x / Non Sq Epi: x / Bacteria: x            RADIOLOGY & ADDITIONAL TESTS:      Care Discussed with Consultants/Other Providers:

## 2024-04-08 ENCOUNTER — TRANSCRIPTION ENCOUNTER (OUTPATIENT)
Age: 39
End: 2024-04-08

## 2024-04-08 VITALS
RESPIRATION RATE: 15 BRPM | SYSTOLIC BLOOD PRESSURE: 113 MMHG | HEART RATE: 87 BPM | TEMPERATURE: 98 F | OXYGEN SATURATION: 100 % | DIASTOLIC BLOOD PRESSURE: 83 MMHG

## 2024-04-08 LAB
ANION GAP SERPL CALC-SCNC: 10 MMOL/L — SIGNIFICANT CHANGE UP (ref 5–17)
BUN SERPL-MCNC: 4 MG/DL — LOW (ref 7–23)
CALCIUM SERPL-MCNC: 8.8 MG/DL — SIGNIFICANT CHANGE UP (ref 8.4–10.5)
CHLORIDE SERPL-SCNC: 108 MMOL/L — SIGNIFICANT CHANGE UP (ref 96–108)
CO2 SERPL-SCNC: 24 MMOL/L — SIGNIFICANT CHANGE UP (ref 22–31)
CREAT SERPL-MCNC: 0.66 MG/DL — SIGNIFICANT CHANGE UP (ref 0.5–1.3)
EGFR: 115 ML/MIN/1.73M2 — SIGNIFICANT CHANGE UP
GLUCOSE SERPL-MCNC: 101 MG/DL — HIGH (ref 70–99)
HCT VFR BLD CALC: 41.6 % — SIGNIFICANT CHANGE UP (ref 34.5–45)
HGB BLD-MCNC: 13.5 G/DL — SIGNIFICANT CHANGE UP (ref 11.5–15.5)
MAGNESIUM SERPL-MCNC: 2 MG/DL — SIGNIFICANT CHANGE UP (ref 1.6–2.6)
MCHC RBC-ENTMCNC: 28.4 PG — SIGNIFICANT CHANGE UP (ref 27–34)
MCHC RBC-ENTMCNC: 32.5 GM/DL — SIGNIFICANT CHANGE UP (ref 32–36)
MCV RBC AUTO: 87.6 FL — SIGNIFICANT CHANGE UP (ref 80–100)
NRBC # BLD: 0 /100 WBCS — SIGNIFICANT CHANGE UP (ref 0–0)
PLATELET # BLD AUTO: 401 K/UL — HIGH (ref 150–400)
POTASSIUM SERPL-MCNC: 4.1 MMOL/L — SIGNIFICANT CHANGE UP (ref 3.5–5.3)
POTASSIUM SERPL-SCNC: 4.1 MMOL/L — SIGNIFICANT CHANGE UP (ref 3.5–5.3)
RBC # BLD: 4.75 M/UL — SIGNIFICANT CHANGE UP (ref 3.8–5.2)
RBC # FLD: 13.5 % — SIGNIFICANT CHANGE UP (ref 10.3–14.5)
SODIUM SERPL-SCNC: 142 MMOL/L — SIGNIFICANT CHANGE UP (ref 135–145)
WBC # BLD: 7.16 K/UL — SIGNIFICANT CHANGE UP (ref 3.8–10.5)
WBC # FLD AUTO: 7.16 K/UL — SIGNIFICANT CHANGE UP (ref 3.8–10.5)

## 2024-04-08 PROCEDURE — 85027 COMPLETE CBC AUTOMATED: CPT

## 2024-04-08 PROCEDURE — 99284 EMERGENCY DEPT VISIT MOD MDM: CPT | Mod: 25

## 2024-04-08 PROCEDURE — 99239 HOSP IP/OBS DSCHRG MGMT >30: CPT

## 2024-04-08 PROCEDURE — 85025 COMPLETE CBC W/AUTO DIFF WBC: CPT

## 2024-04-08 PROCEDURE — 80048 BASIC METABOLIC PNL TOTAL CA: CPT

## 2024-04-08 PROCEDURE — 83735 ASSAY OF MAGNESIUM: CPT

## 2024-04-08 PROCEDURE — 80307 DRUG TEST PRSMV CHEM ANLYZR: CPT

## 2024-04-08 PROCEDURE — 83690 ASSAY OF LIPASE: CPT

## 2024-04-08 PROCEDURE — 96365 THER/PROPH/DIAG IV INF INIT: CPT

## 2024-04-08 PROCEDURE — G0378: CPT

## 2024-04-08 PROCEDURE — 96361 HYDRATE IV INFUSION ADD-ON: CPT

## 2024-04-08 PROCEDURE — 96375 TX/PRO/DX INJ NEW DRUG ADDON: CPT

## 2024-04-08 PROCEDURE — 96376 TX/PRO/DX INJ SAME DRUG ADON: CPT

## 2024-04-08 PROCEDURE — 80053 COMPREHEN METABOLIC PANEL: CPT

## 2024-04-08 PROCEDURE — 36415 COLL VENOUS BLD VENIPUNCTURE: CPT

## 2024-04-08 RX ORDER — LANOLIN ALCOHOL/MO/W.PET/CERES
1 CREAM (GRAM) TOPICAL
Qty: 0 | Refills: 0 | DISCHARGE
Start: 2024-04-08

## 2024-04-08 RX ORDER — TRAZODONE HCL 50 MG
1 TABLET ORAL
Refills: 0 | DISCHARGE

## 2024-04-08 RX ADMIN — Medication 0.5 MILLIGRAM(S): at 08:15

## 2024-04-08 RX ADMIN — ONDANSETRON 4 MILLIGRAM(S): 8 TABLET, FILM COATED ORAL at 09:43

## 2024-04-08 NOTE — PROGRESS NOTE ADULT - SUBJECTIVE AND OBJECTIVE BOX
Patient is a 38y old  Female who presents with a chief complaint of vomiting (08 Apr 2024 07:47)      Patient seen and examined at bedside. feeling improved, tolerating diet. denies headache, fever, chills, cp, sob, n/v, abd pain.      ALLERGIES:  latex (Unknown)  No Known Drug Allergies  Haldol (Urticaria)  Reglan (Unknown)  Tomatoes (Unknown)    MEDICATIONS  (STANDING):  LORazepam     Tablet 1 milliGRAM(s) Oral at bedtime  LORazepam     Tablet 0.5 milliGRAM(s) Oral <User Schedule>  pantoprazole    Tablet 40 milliGRAM(s) Oral before breakfast    MEDICATIONS  (PRN):  acetaminophen     Tablet .. 650 milliGRAM(s) Oral every 6 hours PRN Mild Pain (1 - 3)  ibuprofen  Tablet. 400 milliGRAM(s) Oral every 6 hours PRN Moderate Pain (4 - 6)  LORazepam   Injectable 0.5 milliGRAM(s) IV Push every 8 hours PRN Anxiety  melatonin 3 milliGRAM(s) Oral at bedtime PRN Insomnia  ondansetron Injectable 4 milliGRAM(s) IV Push every 4 hours PRN Nausea and/or Vomiting    Vital Signs Last 24 Hrs  T(F): 98.3 (07 Apr 2024 20:30), Max: 98.3 (07 Apr 2024 20:30)  HR: 83 (07 Apr 2024 20:30) (83 - 94)  BP: 112/98 (07 Apr 2024 20:30) (110/77 - 112/98)  RR: 17 (07 Apr 2024 20:30) (16 - 17)  SpO2: 99% (07 Apr 2024 20:30) (98% - 99%)  I&O's Summary    07 Apr 2024 07:01  -  08 Apr 2024 07:00  --------------------------------------------------------  IN: 1200 mL / OUT: 0 mL / NET: 1200 mL    08 Apr 2024 07:01  -  08 Apr 2024 10:20  --------------------------------------------------------  IN: 0 mL / OUT: 400 mL / NET: -400 mL      BMI (kg/m2): 22.5 (04-06-24 @ 06:55)  PHYSICAL EXAM:  General: NAD, A/O x 3  ENT: MMM, no scleral icterus  Neck: Supple, No JVD  Lungs: Clear to auscultation bilaterally, no wheezes, rales, rhonchi  Cardio: RRR, S1/S2  Abdomen: Soft, Nontender, Nondistended; Bowel sounds present  Extremities: No calf tenderness, No pitting edema    LABS:                        13.5   7.16  )-----------( 401      ( 08 Apr 2024 06:38 )             41.6       04-08    142  |  108  |  4   ----------------------------<  101  4.1   |  24  |  0.66    Ca    8.8      08 Apr 2024 06:38  Mg     2.0     04-08    TPro  7.7  /  Alb  3.8  /  TBili  0.5  /  DBili  x   /  AST  15  /  ALT  18  /  AlkPhos  62  04-06                                  Urinalysis Basic - ( 08 Apr 2024 06:38 )    Color: x / Appearance: x / SG: x / pH: x  Gluc: 101 mg/dL / Ketone: x  / Bili: x / Urobili: x   Blood: x / Protein: x / Nitrite: x   Leuk Esterase: x / RBC: x / WBC x   Sq Epi: x / Non Sq Epi: x / Bacteria: x            RADIOLOGY & ADDITIONAL TESTS:    Care Discussed with Consultants/Other Providers:

## 2024-04-08 NOTE — DISCHARGE NOTE PROVIDER - NSDCCPCAREPLAN_GEN_ALL_CORE_FT
PRINCIPAL DISCHARGE DIAGNOSIS  Diagnosis: Intractable cyclical vomiting  Assessment and Plan of Treatment: You improved with IV fluids and supportive care. Trazodone was discontinued. Continue to take ativan as presribed for anxiety. Follow up with your primary care provider within 1 week of discharge.

## 2024-04-08 NOTE — DISCHARGE NOTE PROVIDER - NSDCMRMEDTOKEN_GEN_ALL_CORE_FT
ondansetron 4 mg oral tablet, disintegratin tab(s) orally every 8 hours as needed for  nausea  traZODone HCl 50MG Tablet: 1 tab(s) orally once a day (at bedtime)   LORazepam 1 mg oral tablet: 0.5 tab(s) orally 2 times a day TAKE 0.5 TABS TWICE DAILY AS NEEDED FOR ANXIETY. TAKE ADDITIONAL 1 TAB AT BEDTIME  AS NEEDED FOR ANXIETY. MDD: 2  melatonin 3 mg oral tablet: 1 tab(s) orally once a day (at bedtime) As needed Insomnia  ondansetron 4 mg oral tablet, disintegratin tab(s) orally every 8 hours as needed for  nausea

## 2024-04-08 NOTE — DISCHARGE NOTE NURSING/CASE MANAGEMENT/SOCIAL WORK - NSDCVIVACCINE_GEN_ALL_CORE_FT
Tdap; 07-Sep-2019 06:53; Beulah Galvez (RN); [employer]; [id number] (Exp. Date: 04-Jul-2021); IntraMuscular; Deltoid Left.; 0.5 milliLiter(s); VIS (VIS Published: 09-May-2013, VIS Presented: 07-Sep-2019);

## 2024-04-08 NOTE — DISCHARGE NOTE PROVIDER - PROVIDER TOKENS
Airway patent, Nasal mucosa clear. Mouth with normal mucosa. Throat has no vesicles, no oropharyngeal exudates and uvula is midline.
PROVIDER:[TOKEN:[[id number]],SCHEDULEDAPPT:[04/11/2024],SCHEDULEDAPPTTIME:[02:00 PM]]

## 2024-04-08 NOTE — DISCHARGE NOTE PROVIDER - HOSPITAL COURSE
Hospital Course  HPI:  37yo female with PMHx of kidney stone, gastritis, anxiety, cyclical vomiting presents to the ED with recurrent episodes of vomiting. Pt states that around 4am had recurrence of severe nausea and vomiting. Vomitus was non-bloody. Afterwards had continued dry heaving. Came to the ED for further evaluation. Denies any recent alcohol use. Took a marijuana gummy last evening to help with her anxiety and nausea. After that did not work, she phoned her psychiatrist who recommended her trazodone. Pt took trazodone 50mg and went to bed. Woke up with current symptoms. Of note, was recently in the  ED for similar symptoms on 4/2/24.  (06 Apr 2024 09:22)    Admitted for cyclical vomiting, improved with IVF. Psych consulted for underlying anxiety, recommended ativan 0.5mg BID and 1mg qhs.  Patient improved clinically and tolerated regular diet. Medically optimized for discharge home.    Discharging Provider: Licha Marcus DO  Contact Info: [phone]- Please call with any questions or concerns.    Outpatient Provider:    Signout given to  SNF Provider:       Hospital Course  HPI:  39yo female with PMHx of kidney stone, gastritis, anxiety, cyclical vomiting presents to the ED with recurrent episodes of vomiting. Pt states that around 4am had recurrence of severe nausea and vomiting. Vomitus was non-bloody. Afterwards had continued dry heaving. Came to the ED for further evaluation. Denies any recent alcohol use. Took a marijuana gummy last evening to help with her anxiety and nausea. After that did not work, she phoned her psychiatrist who recommended her trazodone. Pt took trazodone 50mg and went to bed. Woke up with current symptoms. Of note, was recently in the  ED for similar symptoms on 4/2/24.  (06 Apr 2024 09:22)    Admitted for cyclical vomiting, improved with IVF. Psych consulted for underlying anxiety, recommended ativan 0.5mg BID and 1mg qhs. Endorses worsening anxiety symptoms and restless on home med trazodone - now discontinued.    Patient improved clinically and tolerated regular diet. Medically optimized for discharge home.    Discharging Provider: Licha Marcus DO  Contact Info: [phone]- Please call with any questions or concerns.    Time Spent: 45 minutes   Hospital Course  HPI:  39yo female with PMHx of kidney stone, gastritis, anxiety, cyclical vomiting presents to the ED with recurrent episodes of vomiting. Pt states that around 4am had recurrence of severe nausea and vomiting. Vomitus was non-bloody. Afterwards had continued dry heaving. Came to the ED for further evaluation. Denies any recent alcohol use. Took a marijuana gummy last evening to help with her anxiety and nausea. After that did not work, she phoned her psychiatrist who recommended her trazodone. Pt took trazodone 50mg and went to bed. Woke up with current symptoms. Of note, was recently in the  ED for similar symptoms on 4/2/24.  (06 Apr 2024 09:22)    Admitted for cyclical vomiting, improved with IVF. Psych consulted for underlying anxiety, recommended ativan 0.5mg BID and 1mg qhs. Endorses worsening anxiety symptoms and restless on home med trazodone - now discontinued.    Patient improved clinically and tolerated regular diet. Medically optimized for discharge home.  Discussed with patient's outpatient psych Dr. Lau  at time of discharge.    Discharging Provider: Licha Marcus DO  Contact Info: [phone]- Please call with any questions or concerns.    Time Spent: 45 minutes

## 2024-04-08 NOTE — PROGRESS NOTE ADULT - ASSESSMENT
39 y/o F with PMHx of kidney stone, gastritis, anxiety, cyclical vomiting being admitted with intractable vomiting.     #Cyclical vomiting syndrome - improved  -Likely 2/2 marijuana use  -Tolerating regular diet, DC IVF  -Zofran PRN  -PPI  -Utox +THC  -Psych appreciated    #Hypokalemia  -Replete and monitor    #Anxiety - uncontrolled  -Psych appreciated - cont ativan 0.5mg BID and 1 mg qhs   -Continue trazodone  -Ativan PRN    Full Code  Patient prefers to update  Delbert    Pt will need to be given new PCP upon discharge    Anticipate discharge home today     39 y/o F with PMHx of kidney stone, gastritis, anxiety, cyclical vomiting being admitted with intractable vomiting.     #Cyclical vomiting syndrome - improved  -Likely 2/2 marijuana use  -Tolerating regular diet, DC IVF  -Zofran PRN  -PPI  -Utox +THC  -Psych appreciated    #Hypokalemia - resolved    #Anxiety - improved  -Psych appreciated - cont ativan 0.5mg BID and 1 mg qhs   -DC trazodone - pt reports intolerance - worsening anxiety    Full Code  Patient prefers to update  Delbert    Pt will need to be given new PCP upon discharge    Anticipate discharge home today

## 2024-04-08 NOTE — DISCHARGE NOTE NURSING/CASE MANAGEMENT/SOCIAL WORK - PATIENT PORTAL LINK FT
You can access the FollowMyHealth Patient Portal offered by [facility] by registering at the following website: [email or web address]. By joining [email or web address]’s FollowMyHealth portal, you will also be able to view your health information using other applications (apps) compatible with our system.

## 2024-04-13 NOTE — ED ADULT TRIAGE NOTE - BSA (M2)
1.64 37 y/o female w/ a PMHx of ectropic s/p methotrexate presents to the ED 5 weeks pregnant  c/o right sided abd pain. Pt saw her OB yesterday, had an US done and was told that the sac couldn't be fully visualized, advised to come to ED for eval. Denies vaginal bleeding, fevers, chills, or falls/injures. Pt did not have labs drawn yesterday. No other complaints at this time. LMP 3.

## 2024-09-11 ENCOUNTER — NON-APPOINTMENT (OUTPATIENT)
Age: 39
End: 2024-09-11

## 2024-10-07 ENCOUNTER — NON-APPOINTMENT (OUTPATIENT)
Age: 39
End: 2024-10-07

## 2024-10-09 NOTE — ED ADULT NURSE NOTE - CAS DISCH TRANSFER METHOD
How Severe Are Your Spot(S)?: mild
What Type Of Note Output Would You Prefer (Optional)?: Bullet Format
What Is The Reason For Today's Visit?: Full Body Skin Examination
What Is The Reason For Today's Visit? (Being Monitored For X): surveillance against the recurrence of atypical nevi
Private car

## 2024-10-23 NOTE — ED PROVIDER NOTE - DISCHARGE REVIEW MATERIAL PRESENTED
The clinical goals for the shift include pt will maintain safety, free from falls and injuries throughout this shift    Over the shift, the patient did make progress toward the following goals.       Problem: Pain - Adult  Goal: Verbalizes/displays adequate comfort level or baseline comfort level  Outcome: Progressing     Problem: Safety - Adult  Goal: Free from fall injury  Outcome: Progressing     Problem: Discharge Planning  Goal: Discharge to home or other facility with appropriate resources  Outcome: Progressing     Problem: Chronic Conditions and Co-morbidities  Goal: Patient's chronic conditions and co-morbidity symptoms are monitored and maintained or improved  Outcome: Progressing     Problem: Pain  Goal: Turns in bed with improved pain control throughout the shift  Outcome: Progressing  Goal: Walks with improved pain control throughout the shift  Outcome: Progressing  Goal: Performs ADL's with improved pain control throughout shift  Outcome: Progressing  Goal: Free from opioid side effects throughout the shift  Outcome: Progressing      .

## 2024-12-04 NOTE — ED ADULT NURSE NOTE - CAS DISCH BELONGINGS RETURNED
Quality 431: Preventive Care And Screening: Unhealthy Alcohol Use - Screening: Patient not identified as an unhealthy alcohol user when screened for unhealthy alcohol use using a systematic screening method Detail Level: Detailed Quality 226: Preventive Care And Screening: Tobacco Use: Screening And Cessation Intervention: Patient screened for tobacco use and is an ex/non-smoker Quality 130: Documentation Of Current Medications In The Medical Record: Current Medications Documented Not applicable

## 2024-12-16 NOTE — ED PROVIDER NOTE - TEMPLATE, MLM
"Progress Note - Hospitalist   Name: Ileana Seaman 66 y.o. female I MRN: [id number]  Unit/Bed#: -01 I Date of Admission: 12/8/2024   Date of Service: 12/16/2024 I Hospital Day: 7    Assessment & Plan  Epigastric pain  Recurrent epigastric abdominal pain and diarrhea, admitted 12/6 and discharged 12/7 for same.  Returns to ED states pain returned at 0300 on 12/8.  Thoroughly evaluated by ED, found to have elevated liver enzymes and discussed with gastroenterology, abdominal ultrasound obtained with reassuring findings no liver abnormality  Normal lactic acid level  Patient refused to leave from the emergency department continuing to say she is having severe burning of her insides   no further narcotic medications as this patient is suspected of drug-seeking behavior  Will continue PPI, Carafate, Pepcid outpatient regimen for abdominal pain  Avoid Tylenol due to elevated liver enzymes suspected secondary to MARIS treatment  Per ED note  \"patient with persistent recurrence of pain, requiring repeat medication dosing including IV narcotic medications. Recommendation made for attempt at oral pain medication. Patient expressed concerns about \"waking up in the middle of the night with my insides of burning, and if they admit me, they could figure out why that is happening.\"   Keep stool record at bedside, monitor episodes of diarrhea occurring in 24-hour  Patient has recent EGD done on November in this hospital, reviewed  Discussed the case with gastroenterology team, we will proceed with mesenteric arterial duplex-spoke to vascular surgery and recommend outpt follow up  Will trial bentyl,simethicone for now  Mri abd reviewed. Cont trial of food and monitor pain symptoms . Also placed on stool softeners for constipation  cta abd Abdominal aorta and branches are patent with no aneurysm or dissection.Celiac artery with moderate origin stenosis with patent branches. Replaced right hepatic artery from the superior " mesenteric artery.Mild to moderate atherosclerotic changes in the pelvic vessels with tortuous left external iliac artery.Stool impaction in the ascending and transverse colon.  Placed on tap water enema and bowel prep.so far has had multiple small loose bm. Cont miralax and colace.  Patient is quite anxious and complaining of abdominal pain again today and burning.  Will ask psychiatry to evaluate her and placed on low-dose Ativan to help her relax.  Mycobacterial infection, non-TB  Hx MARIS infection and 3 drug regimen managed by ID (Dr. Landrum )  Azithromycin, rifabutin and ethambutol placed on hold due to elevated liver enzymes.lfts now resolving  Consult inf disease for reviewing medications and recommendations . Will adjust regimen further tomorrow  Start graded medication challenge with zithromax 250 mg q48 hrs.  Has a televisit with her regular infectious disease doctor tomorrow which she wants to do.  discussed with Dr. Carter patient has had 3 hospitalizations since starting the 3 drug regimen and starting a graded approach might be better for long-term compliance.monitor cbc and cmp  COPD (chronic obstructive pulmonary disease) (HCC)  No exacerbation  Continue PTA regimen  Atrial fibrillation (HCC)  Continue metoprolol  Not chronically anticoagulated  Chronic hypoxic respiratory failure (HCC)  Maintained on 2 L nasal cannula  Moderate protein-calorie malnutrition (HCC)  Malnutrition Findings: Loss of subcutaneous fat in orbital, triceps, ribcage areas.  Loss of muscle at temples, clavicles, scapula, extremities.  Consultation to dietitian      Adult Malnutrition type: Chronic illnessBMI Findings:     Body mass index is 18.71 kg/m².     Elevated liver enzymes  Suspect DILI  ALT/AST > 3 x nomal, T bili >2 x normal  Azithromycin, ethambutol and rifabutin on hold due to elevated liver enzymes   Will need to follow up with ID for treatment alteration - MARIS infection and 3 drug regimen managed by ID (  Diallo)   neg Tylenol level and normal PT/INR   Ultrasound RUQ - S/P cholecystectomy, liver imaging unrevealing of abnormality  Will need outpatient follow-up  Celiac artery stenosis (HCC)  Outpt follow up with vascular    VTE Pharmacologic Prophylaxis:   Moderate Risk (Score 3-4) - Pharmacological DVT Prophylaxis Contraindicated. Sequential Compression Devices Ordered.    Mobility:   Basic Mobility Inpatient Raw Score: 24  JH-HLM Goal: 8: Walk 250 feet or more  JH-HLM Achieved: 8: Walk 250 feet ot more  JH-HLM Goal achieved. Continue to encourage appropriate mobility.    Patient Centered Rounds: I performed bedside rounds with nursing staff today.   Discussions with Specialists or Other Care Team Provider: alison gi and inf disease    Education and Discussions with Family / Patient: will update daughter    Current Length of Stay: 7 day(s)  Current Patient Status: Inpatient   Certification Statement: The patient will continue to require additional inpatient hospital stay due to abd pain  Discharge Plan: Anticipate discharge tomorrow to home.    Code Status: Level 1 - Full Code    Subjective   Patient appears anxious . States she is still having gas pain and burning and also mid abd pain and feels lousy today    Objective :  Temp:  [97.2 °F (36.2 °C)-97.7 °F (36.5 °C)] 97.2 °F (36.2 °C)  HR:  [72-86] 86  BP: (109-119)/(63-72) 119/63  Resp:  [16-19] 16  SpO2:  [95 %-99 %] 98 %  O2 Device: Nasal cannula  Nasal Cannula O2 Flow Rate (L/min):  [2 L/min] 2 L/min    Body mass index is 18.71 kg/m².     Input and Output Summary (last 24 hours):     Intake/Output Summary (Last 24 hours) at 12/16/2024 1146  Last data filed at 12/16/2024 0601  Gross per 24 hour   Intake 120 ml   Output --   Net 120 ml       Physical Exam  Vitals and nursing note reviewed.   Constitutional:       Appearance: She is ill-appearing.   HENT:      Head: Normocephalic and atraumatic.      Right Ear: External ear normal.      Left Ear: External ear normal.       Nose: Nose normal.      Mouth/Throat:      Pharynx: Oropharynx is clear.   Cardiovascular:      Rate and Rhythm: Normal rate and regular rhythm.      Heart sounds: Normal heart sounds.   Pulmonary:      Effort: Pulmonary effort is normal.      Breath sounds: Normal breath sounds.   Abdominal:      General: Bowel sounds are normal.      Palpations: Abdomen is soft.      Tenderness: There is abdominal tenderness.   Musculoskeletal:         General: Normal range of motion.      Cervical back: Normal range of motion and neck supple.   Skin:     General: Skin is warm and dry.      Capillary Refill: Capillary refill takes less than 2 seconds.   Neurological:      General: No focal deficit present.      Mental Status: She is alert and oriented to person, place, and time.   Psychiatric:      Comments: anxious           Lines/Drains:              Lab Results: I have reviewed the following results:       Results from last 7 days   Lab Units 12/16/24  0611   SODIUM mmol/L 139   POTASSIUM mmol/L 3.8   CHLORIDE mmol/L 102   CO2 mmol/L 31   BUN mg/dL 7   CREATININE mg/dL 0.56*   ANION GAP mmol/L 6   CALCIUM mg/dL 9.1   ALBUMIN g/dL 3.5   TOTAL BILIRUBIN mg/dL 0.43   ALK PHOS U/L 125*   ALT U/L 40   AST U/L 12*   GLUCOSE RANDOM mg/dL 92                       Recent Cultures (last 7 days):         Imaging Results Review: I reviewed radiology reports from this admission including: CT abdomen/pelvis and xray(s).  Other Study Results Review: EKG was reviewed.     Last 24 Hours Medication List:     Current Facility-Administered Medications:     albuterol inhalation solution 2.5 mg, Q4H PRN    azithromycin (ZITHROMAX) tablet 500 mg, Every Other Day    calcium carbonate (TUMS) chewable tablet 500 mg, Daily PRN    dicyclomine (BENTYL) capsule 10 mg, TID AC    docusate sodium (COLACE) capsule 100 mg, BID    famotidine (PEPCID) tablet 20 mg, BID    fluconazole (DIFLUCAN) tablet 200 mg, Daily    Fluticasone Furoate-Vilanterol 100-25  mcg/actuation 1 puff, Daily **AND** umeclidinium 62.5 mcg/actuation inhaler AEPB 1 puff, Daily    hydrOXYzine HCL (ATARAX) tablet 25 mg, Q6H PRN    ipratropium-albuterol (DUO-NEB) 0.5-2.5 mg/3 mL inhalation solution 3 mL, TID    LORazepam (ATIVAN) tablet 0.25 mg, Q8H PRN    metoprolol tartrate (LOPRESSOR) tablet 25 mg, Q12H MAYCO    ondansetron (ZOFRAN) injection 4 mg, Q8H PRN    pantoprazole (PROTONIX) EC tablet 40 mg, BID AC    polyethylene glycol (MIRALAX) packet 17 g, BID    simethicone (MYLICON) chewable tablet 80 mg, Q6H PRN    witch hazel-glycerin (TUCKS) topical pad 1 Pad, Q4H PRN    Administrative Statements   Today, Patient Was Seen By: Modesta Santizo MD      **Please Note: This note may have been constructed using a voice recognition system.**   Abdominal Pain, N/V/D

## 2025-01-15 ENCOUNTER — NON-APPOINTMENT (OUTPATIENT)
Age: 40
End: 2025-01-15

## 2025-01-15 PROBLEM — F41.9 ANXIETY DISORDER, UNSPECIFIED: Chronic | Status: ACTIVE | Noted: 2024-04-06

## 2025-01-15 PROBLEM — R11.15 CYCLICAL VOMITING SYNDROME UNRELATED TO MIGRAINE: Chronic | Status: ACTIVE | Noted: 2024-04-06

## 2025-02-04 ENCOUNTER — APPOINTMENT (OUTPATIENT)
Dept: UROLOGY | Facility: CLINIC | Age: 40
End: 2025-02-04

## 2025-02-13 NOTE — ED ADULT NURSE NOTE - MODE OF DISCHARGE
Microcytic with history of SEA from heavy menses. Ongoing Menses at this time, unchanged from usual.     - Continue Oral iron.   - Monitor for signs of extensive bleeding.
Ambulatory

## 2025-03-10 NOTE — ED ADULT TRIAGE NOTE - CHIEF COMPLAINT QUOTE
"Burning/pain on urination, lower back pain" Anesthesia Post-op Note    Patient: Katharina M Day  Procedure(s) Performed: COLONOSCOPY, WITH POLYPECTOMY  Anesthesia type: MAC    Vitals Value Taken Time   Temp 36.1 °C (97 °F) 03/10/25 1100   Pulse 53 03/10/25 1100   Resp 19 03/10/25 1100   SpO2 100 % 03/10/25 1100   /64 03/10/25 1100            Anesthesia Post-op Note      Patient: Katharina M Day      Mental status recovered.  VS noted and are stable.  Respiratory function satisfactory; airway patent.  Cardiovascular function and hydration status satisfactory.  Adequate pain control.  Nausea and vomiting control satisfactory.  No apparent anesthetic complications.

## 2025-04-08 ENCOUNTER — NON-APPOINTMENT (OUTPATIENT)
Age: 40
End: 2025-04-08

## 2025-04-11 ENCOUNTER — EMERGENCY (EMERGENCY)
Facility: HOSPITAL | Age: 40
LOS: 1 days | End: 2025-04-11
Attending: INTERNAL MEDICINE | Admitting: INTERNAL MEDICINE
Payer: COMMERCIAL

## 2025-04-11 VITALS
TEMPERATURE: 98 F | SYSTOLIC BLOOD PRESSURE: 112 MMHG | DIASTOLIC BLOOD PRESSURE: 80 MMHG | HEART RATE: 80 BPM | RESPIRATION RATE: 18 BRPM | OXYGEN SATURATION: 99 %

## 2025-04-11 VITALS
OXYGEN SATURATION: 99 % | HEART RATE: 74 BPM | DIASTOLIC BLOOD PRESSURE: 81 MMHG | HEIGHT: 66 IN | RESPIRATION RATE: 18 BRPM | SYSTOLIC BLOOD PRESSURE: 118 MMHG | TEMPERATURE: 98 F | WEIGHT: 119.93 LBS

## 2025-04-11 DIAGNOSIS — Z98.891 HISTORY OF UTERINE SCAR FROM PREVIOUS SURGERY: Chronic | ICD-10-CM

## 2025-04-11 LAB
APPEARANCE UR: CLEAR — SIGNIFICANT CHANGE UP
BACTERIA # UR AUTO: ABNORMAL /HPF
BASOPHILS # BLD AUTO: 0.04 K/UL — SIGNIFICANT CHANGE UP (ref 0–0.2)
BASOPHILS NFR BLD AUTO: 0.5 % — SIGNIFICANT CHANGE UP (ref 0–2)
BILIRUB UR-MCNC: NEGATIVE — SIGNIFICANT CHANGE UP
COLOR SPEC: SIGNIFICANT CHANGE UP
DIFF PNL FLD: NEGATIVE — SIGNIFICANT CHANGE UP
EOSINOPHIL # BLD AUTO: 0.07 K/UL — SIGNIFICANT CHANGE UP (ref 0–0.5)
EOSINOPHIL NFR BLD AUTO: 0.8 % — SIGNIFICANT CHANGE UP (ref 0–6)
EPI CELLS # UR: PRESENT
GLUCOSE UR QL: NEGATIVE MG/DL — SIGNIFICANT CHANGE UP
HCT VFR BLD CALC: 38.8 % — SIGNIFICANT CHANGE UP (ref 34.5–45)
HGB BLD-MCNC: 12.6 G/DL — SIGNIFICANT CHANGE UP (ref 11.5–15.5)
IMM GRANULOCYTES NFR BLD AUTO: 0.2 % — SIGNIFICANT CHANGE UP (ref 0–0.9)
KETONES UR-MCNC: NEGATIVE MG/DL — SIGNIFICANT CHANGE UP
LACTATE SERPL-SCNC: 1.1 MMOL/L — SIGNIFICANT CHANGE UP (ref 0.7–2)
LEUKOCYTE ESTERASE UR-ACNC: NEGATIVE — SIGNIFICANT CHANGE UP
LYMPHOCYTES # BLD AUTO: 2.78 K/UL — SIGNIFICANT CHANGE UP (ref 1–3.3)
LYMPHOCYTES # BLD AUTO: 33.1 % — SIGNIFICANT CHANGE UP (ref 13–44)
MCHC RBC-ENTMCNC: 28.8 PG — SIGNIFICANT CHANGE UP (ref 27–34)
MCHC RBC-ENTMCNC: 32.5 G/DL — SIGNIFICANT CHANGE UP (ref 32–36)
MCV RBC AUTO: 88.6 FL — SIGNIFICANT CHANGE UP (ref 80–100)
MONOCYTES # BLD AUTO: 0.68 K/UL — SIGNIFICANT CHANGE UP (ref 0–0.9)
MONOCYTES NFR BLD AUTO: 8.1 % — SIGNIFICANT CHANGE UP (ref 2–14)
NEUTROPHILS # BLD AUTO: 4.8 K/UL — SIGNIFICANT CHANGE UP (ref 1.8–7.4)
NEUTROPHILS NFR BLD AUTO: 57.3 % — SIGNIFICANT CHANGE UP (ref 43–77)
NITRITE UR-MCNC: POSITIVE
NRBC BLD AUTO-RTO: 0 /100 WBCS — SIGNIFICANT CHANGE UP (ref 0–0)
PH UR: 6.5 — SIGNIFICANT CHANGE UP (ref 5–8)
PLATELET # BLD AUTO: 324 K/UL — SIGNIFICANT CHANGE UP (ref 150–400)
PROT UR-MCNC: NEGATIVE MG/DL — SIGNIFICANT CHANGE UP
RBC # BLD: 4.38 M/UL — SIGNIFICANT CHANGE UP (ref 3.8–5.2)
RBC # FLD: 13.2 % — SIGNIFICANT CHANGE UP (ref 10.3–14.5)
RBC CASTS # UR COMP ASSIST: 0 /HPF — SIGNIFICANT CHANGE UP (ref 0–4)
SP GR SPEC: 1.02 — SIGNIFICANT CHANGE UP (ref 1–1.03)
UROBILINOGEN FLD QL: 1 MG/DL — SIGNIFICANT CHANGE UP (ref 0.2–1)
WBC # BLD: 8.39 K/UL — SIGNIFICANT CHANGE UP (ref 3.8–10.5)
WBC # FLD AUTO: 8.39 K/UL — SIGNIFICANT CHANGE UP (ref 3.8–10.5)
WBC UR QL: 3 /HPF — SIGNIFICANT CHANGE UP (ref 0–5)

## 2025-04-11 PROCEDURE — 87040 BLOOD CULTURE FOR BACTERIA: CPT

## 2025-04-11 PROCEDURE — 84702 CHORIONIC GONADOTROPIN TEST: CPT

## 2025-04-11 PROCEDURE — 96374 THER/PROPH/DIAG INJ IV PUSH: CPT

## 2025-04-11 PROCEDURE — 99284 EMERGENCY DEPT VISIT MOD MDM: CPT | Mod: 25

## 2025-04-11 PROCEDURE — 83605 ASSAY OF LACTIC ACID: CPT

## 2025-04-11 PROCEDURE — 96375 TX/PRO/DX INJ NEW DRUG ADDON: CPT

## 2025-04-11 PROCEDURE — 85025 COMPLETE CBC W/AUTO DIFF WBC: CPT

## 2025-04-11 PROCEDURE — 36415 COLL VENOUS BLD VENIPUNCTURE: CPT

## 2025-04-11 PROCEDURE — 80053 COMPREHEN METABOLIC PANEL: CPT

## 2025-04-11 PROCEDURE — 99284 EMERGENCY DEPT VISIT MOD MDM: CPT

## 2025-04-11 PROCEDURE — 81001 URINALYSIS AUTO W/SCOPE: CPT

## 2025-04-11 RX ORDER — ACETAMINOPHEN 500 MG/5ML
1000 LIQUID (ML) ORAL ONCE
Refills: 0 | Status: COMPLETED | OUTPATIENT
Start: 2025-04-11 | End: 2025-04-11

## 2025-04-11 RX ORDER — CEFTRIAXONE 500 MG/1
1000 INJECTION, POWDER, FOR SOLUTION INTRAMUSCULAR; INTRAVENOUS ONCE
Refills: 0 | Status: COMPLETED | OUTPATIENT
Start: 2025-04-11 | End: 2025-04-11

## 2025-04-11 RX ADMIN — Medication 400 MILLIGRAM(S): at 22:44

## 2025-04-11 RX ADMIN — CEFTRIAXONE 100 MILLIGRAM(S): 500 INJECTION, POWDER, FOR SOLUTION INTRAMUSCULAR; INTRAVENOUS at 21:44

## 2025-04-11 RX ADMIN — Medication 1000 MILLILITER(S): at 21:44

## 2025-04-11 NOTE — ED ADULT NURSE NOTE - OBJECTIVE STATEMENT
Pt presents to the ED c/o B/L flank pain. Pt has current UTI, on meds with no relief. Reports fevers and lethargy. IV established in left arm with a 20G, labs drawn and sent, call bell in reach, warm blanket provided, bed in lowest position, side rails up x2, MD evaluation in progress, pt on telemetry. Hx kidney stones, cyclic vomiting, frequent UTIs.

## 2025-04-11 NOTE — ED PROVIDER NOTE - SIGNIFICANT NEGATIVE FINDINGS
no headache, no fever, no rash no toxemia, no chest pain, no SOB, no palpitations, no abdominal pain, no n/v no GYN no urinary bleeding. no neuro changes.

## 2025-04-11 NOTE — ED PROVIDER NOTE - NSFOLLOWUPINSTRUCTIONS_ED_ALL_ED_FT
Follow up with your primary care doctor tomorrow.  Continue taking the Macrobid that was prescribed for you.   Return to the hospital if you have fever, chills, nausea, vomiting or back pain.

## 2025-04-11 NOTE — ED PROVIDER NOTE - CLINICAL SUMMARY MEDICAL DECISION MAKING FREE TEXT BOX
40y/o female,  diagnosed with recent UTI, w/ burning sensation, and mild pain, she was prescribed Macrobid by . no headache, no fever, no rash no toxemia, no chest pain, no SOB, no palpitations, no abdominal pain, no n/v no GYN no urinary bleeding. no neuro changes. VSS exam stable labs and U/A are non acute patient declining CT, stable for DC to OP care, continue with Macrobid

## 2025-04-11 NOTE — ED PROVIDER NOTE - OBJECTIVE STATEMENT
Four days or diagnosed UTI, w/ burning sensation, and pain radiating up towards bilateral flank areas w/ lethargy.  urinary symptoms 40y/o female,  diagnosed with recent UTI, w/ burning sensation, and mild back pain, she was prescribed Macrobid by UC. no headache, no fever, no rash no toxemia, no chest pain, no SOB, no palpitations, no abdominal pain, no n/v no GYN no urinary bleeding. no neuro changes.

## 2025-04-11 NOTE — ED PROVIDER NOTE - CARE PROVIDER_API CALL
Sam Birch  Urology  [address], [address]  [place] [address]  Phone: [phone]  Fax: [phone]  Follow Up Time: 1-3 Days

## 2025-04-11 NOTE — ED ADULT TRIAGE NOTE - CHIEF COMPLAINT QUOTE
Four days or diagnosed UTI, w/ burning sensation, and pain radiating up towards bilateral flank areas w/ lethargy.

## 2025-04-11 NOTE — ED ADULT NURSE NOTE - NS ED NURSE LEVEL OF CONSCIOUSNESS ORIENTATION
Patient and FOB arrived at [phone] for cervidil induction. Patient is  one loss and two term vaginal deliveries without complication. EDC 2019 39 weeks gestation. EFM tested and applied. Patient and  orientated to room.  Questions answered Oriented - self; Oriented - place; Oriented - time

## 2025-04-11 NOTE — ED ADULT NURSE REASSESSMENT NOTE - NS ED NURSE REASSESS COMMENT FT1
received pt. from previous RN - Ansley, pt. is on bed , still awaiting for ultrasound, alert and oriented, safety maintained and applied.

## 2025-04-11 NOTE — ED PROVIDER NOTE - PATIENT PORTAL LINK FT
You can access the FollowMyHealth Patient Portal offered by [facility] by registering at the following website: [email or web address]. By joining [employer]’s FollowMyHealth portal, you will also be able to view your health information using other applications (apps) compatible with our system.

## 2025-04-11 NOTE — ED PROVIDER NOTE - CPE EDP NEURO NORM
Occupational Therapy    Visit Type: treatment  Precautions:  Medical precautions:  fall risk; standard precautions.  10/07/2022: Patient presents from home with HA, N&V, HTN; CTH shows SAH, no shift; CTA shows no aneurysmal bleed > NCCU  PT/OT/ST orders, AAT    10/7: scheduled for dx cerebral angiogram, local anesthesia, no aneurysms, AVMs, or IVFs  10/8:  MRI Brain - Reidentification of diffuse subarachnoid, cisternal, and  intraventricular hemorrhage. Mild cerebral atrophy  Therapist wearing surgical mask during entire session.    Patient was NOT wearing mask throughout duration of therapy session due to ADL .     Lines:     Basic Lines: BP cuff   Hearing: hard of hearing  Safety Measures: chair alarm and bed alarm  SUBJECTIVE  Patient agreed to participate in therapy this date.  I have pain in my head.    Pain   RN informed on pain level     OBJECTIVE     Cognitive Status   Level of Consciousness   - alert  Orientation    - Oriented to: person, place, time and situation  Functional Communication   - Overall Status: impaired   - Forms of Communication: verbal  Attention Span    - Attention: appears intact  Following Direction   - follows one step commands with repetition and follows one step commands with increased time  Memory   - impaired - decreased short term memory  Safety Awareness/Insight   - impulsive and impaired  Awareness of Deficits   - decreased awareness of deficits        Bed Mobility  - Supine to sit: supervision  Bed to chair at a [facility] with a gaitbelt and RW  Transfers  Assistive devices: gait belt  - Sit to stand: contact guard/touching/steadying assist  - Stand to sit: contact guard/touching/steadying assist      Activities of Daily Living (ADLs)  Grooming/Oral Hygiene:   - Grooming assist: supervision       - Oral hygiene assist: supervision  - Position: wheelchair  - Equipment: electric razor  Lower Body Dressing:   - Footwear:       - Assistance: stand by assist (put his foot on a chair to tie  his shoes)       - Position: edge of bed       - Type: tie shoe  Toileting:   - Toilet transfer:        - Assist: contact guard/touching/steadying assist       - Device: gait belt and 2-wheeled walker       - Equipment: toilet safety frame and grab bar use  - Assist: contact guard/touching/steadying assist  - Assist needed for: steadying and increased time to complete  - Equipment: grab bar use and toilet safety frame         Therapeutic Exercise  UE strengthening exercises with a dowel candelario 3 sets 15 with a 2# dowel candelario.         ASSESSMENT  Impairments: activity tolerance, balance and strength  Functional Limitations: toileting, dressing, bathing and functional transfers           Discharge Recommendations:  Recommendations for Discharge: OT IL: Patient requires  intermittent assistance to perform mobility and/or ADLs safely, Patient is appropriate for Occupational Therapy 1-3 times per week     Progress: improving as expected    • Skilled therapy is required to address these limitations in attempt to maximize the patient's independence.    Education Provided:   Learning Assessment:  - Primary learner: patient  - Are they ready to learn: yes  - Preferred learning style: verbal  - Barriers to learning: no barriers apparent at this time  Education provided during session:  - Receiving Education: patient  - Results of above outlined education: Needs reinforcement    Patient at End of Session:   Location: in wheelchair  Safety measures: alarm system in place/re-engaged and call light within reach  Handoff to: nurse    PLAN  Suggestions for next session as indicated: Frequency: 5-7 days per week  Frequency Comments: 1-1.5hrs/day   Duration:  11/2/2022   Interventions: therapeutic exercise, therapeutic activity, ADL retraining, balance and upper extremity strengthening/ROM  Agreement to plan and goals: patient agrees with goals and treatment plan      GOALS  Review Date: 10/27/2022  Short Term Goals (STGs): to be met 7  days from date established, unless otherwise stated.  - Patient will complete lower body dressing at minimal assist level with adaptive equipment as deemed appropriate.  - Patient will complete toileting at moderate assist level with adaptive equipment as deemed appropriate.  - Patient will complete toilet transfer at contact guard assist level with adaptive equipment as deemed appropriate.  - Patient will complete walk-in shower transfer at minimal assist level with adaptive equipment as deemed appropriate.  Long Term Goals (LTGs): to be met by discharge from rehab program.  - Patient will complete toilet transfer at supervision level with adaptive equipment as deemed appropriate.  - Patient will complete walk-in shower transfer at supervision level with adaptive equipment as deemed appropriate.  - Patient will complete lower body dressing at supervision level with adaptive equipment as deemed appropriate.  - Patient will complete toileting at supervision level with adaptive equipment as deemed appropriate.    Documented in the chart in the following areas: Assessment. Plan.      Therapy procedure time and total treatment time can be found documented on the Time Entry flowsheet   normal...

## 2025-04-17 LAB
CULTURE RESULTS: SIGNIFICANT CHANGE UP
CULTURE RESULTS: SIGNIFICANT CHANGE UP
SPECIMEN SOURCE: SIGNIFICANT CHANGE UP
SPECIMEN SOURCE: SIGNIFICANT CHANGE UP

## 2025-05-03 ENCOUNTER — NON-APPOINTMENT (OUTPATIENT)
Age: 40
End: 2025-05-03

## 2025-05-07 ENCOUNTER — NON-APPOINTMENT (OUTPATIENT)
Age: 40
End: 2025-05-07

## 2025-05-08 ENCOUNTER — APPOINTMENT (OUTPATIENT)
Dept: INTERNAL MEDICINE | Facility: CLINIC | Age: 40
End: 2025-05-08

## 2025-05-08 ENCOUNTER — NON-APPOINTMENT (OUTPATIENT)
Age: 40
End: 2025-05-08

## 2025-05-08 ENCOUNTER — APPOINTMENT (OUTPATIENT)
Dept: INTERNAL MEDICINE | Facility: CLINIC | Age: 40
End: 2025-05-08
Payer: COMMERCIAL

## 2025-05-08 VITALS
RESPIRATION RATE: 14 BRPM | OXYGEN SATURATION: 98 % | TEMPERATURE: 97.8 F | WEIGHT: 123 LBS | SYSTOLIC BLOOD PRESSURE: 125 MMHG | HEIGHT: 66 IN | HEART RATE: 70 BPM | BODY MASS INDEX: 19.77 KG/M2 | DIASTOLIC BLOOD PRESSURE: 86 MMHG

## 2025-05-08 DIAGNOSIS — Z01.818 ENCOUNTER FOR OTHER PREPROCEDURAL EXAMINATION: ICD-10-CM

## 2025-05-08 PROCEDURE — 85610 PROTHROMBIN TIME: CPT | Mod: QW

## 2025-05-08 PROCEDURE — 93000 ELECTROCARDIOGRAM COMPLETE: CPT

## 2025-05-08 PROCEDURE — 36415 COLL VENOUS BLD VENIPUNCTURE: CPT

## 2025-05-08 PROCEDURE — 99213 OFFICE O/P EST LOW 20 MIN: CPT

## 2025-05-08 RX ORDER — LORAZEPAM 0.5 MG/1
0.5 TABLET ORAL
Refills: 0 | Status: ACTIVE | COMMUNITY
Start: 2025-05-08

## 2025-05-08 RX ORDER — ONDANSETRON 4 MG/1
4 TABLET ORAL
Refills: 0 | Status: ACTIVE | COMMUNITY
Start: 2025-05-08

## 2025-05-08 NOTE — PHYSICAL EXAM
[No Acute Distress] : no acute distress [Well Nourished] : well nourished [Well Developed] : well developed [Normal Sclera/Conjunctiva] : normal sclera/conjunctiva [PERRL] : pupils equal round and reactive to light [EOMI] : extraocular movements intact [Normal Outer Ear/Nose] : the outer ears and nose were normal in appearance [Normal Oropharynx] : the oropharynx was normal [No JVD] : no jugular venous distention [No Lymphadenopathy] : no lymphadenopathy [No Respiratory Distress] : no respiratory distress  [No Accessory Muscle Use] : no accessory muscle use [Clear to Auscultation] : lungs were clear to auscultation bilaterally [Normal Rate] : normal rate  [Regular Rhythm] : with a regular rhythm [Normal S1, S2] : normal S1 and S2 [No Edema] : there was no peripheral edema [Soft] : abdomen soft [Non Tender] : non-tender [Normal Posterior Cervical Nodes] : no posterior cervical lymphadenopathy [Normal Anterior Cervical Nodes] : no anterior cervical lymphadenopathy [No CVA Tenderness] : no CVA  tenderness [No Spinal Tenderness] : no spinal tenderness [Grossly Normal Strength/Tone] : grossly normal strength/tone [No Rash] : no rash [Coordination Grossly Intact] : coordination grossly intact [No Focal Deficits] : no focal deficits [Normal Gait] : normal gait [Normal Affect] : the affect was normal [Normal Insight/Judgement] : insight and judgment were intact

## 2025-05-09 ENCOUNTER — TRANSCRIPTION ENCOUNTER (OUTPATIENT)
Age: 40
End: 2025-05-09

## 2025-05-09 PROBLEM — Z01.818 PREOP EXAMINATION: Status: ACTIVE | Noted: 2025-05-08

## 2025-05-09 LAB
ALBUMIN SERPL ELPH-MCNC: 4.3 G/DL
ALP BLD-CCNC: 64 U/L
ALT SERPL-CCNC: 15 U/L
ANION GAP SERPL CALC-SCNC: 13 MMOL/L
APTT BLD: 25.1 SEC
AST SERPL-CCNC: 15 U/L
BASOPHILS # BLD AUTO: 0.05 K/UL
BASOPHILS NFR BLD AUTO: 0.5 %
BILIRUB SERPL-MCNC: 0.3 MG/DL
BUN SERPL-MCNC: 14 MG/DL
CALCIUM SERPL-MCNC: 9.4 MG/DL
CHLORIDE SERPL-SCNC: 100 MMOL/L
CO2 SERPL-SCNC: 23 MMOL/L
CREAT SERPL-MCNC: 0.55 MG/DL
EGFRCR SERPLBLD CKD-EPI 2021: 120 ML/MIN/1.73M2
EOSINOPHIL # BLD AUTO: 0.07 K/UL
EOSINOPHIL NFR BLD AUTO: 0.7 %
ESTIMATED AVERAGE GLUCOSE: 105 MG/DL
GLUCOSE SERPL-MCNC: 86 MG/DL
HBA1C MFR BLD HPLC: 5.3 %
HCG SERPL-MCNC: <1 MIU/ML
HCT VFR BLD CALC: 40.2 %
HGB BLD-MCNC: 12.8 G/DL
IMM GRANULOCYTES NFR BLD AUTO: 0.2 %
INR PPP: 0.97 RATIO
LYMPHOCYTES # BLD AUTO: 3.4 K/UL
LYMPHOCYTES NFR BLD AUTO: 35.3 %
MAN DIFF?: NORMAL
MCHC RBC-ENTMCNC: 28.8 PG
MCHC RBC-ENTMCNC: 31.8 G/DL
MCV RBC AUTO: 90.3 FL
MONOCYTES # BLD AUTO: 0.65 K/UL
MONOCYTES NFR BLD AUTO: 6.8 %
NEUTROPHILS # BLD AUTO: 5.43 K/UL
NEUTROPHILS NFR BLD AUTO: 56.5 %
PLATELET # BLD AUTO: 402 K/UL
POTASSIUM SERPL-SCNC: 4.5 MMOL/L
PROT SERPL-MCNC: 7 G/DL
PT BLD: 11.6 SEC
RBC # BLD: 4.45 M/UL
RBC # FLD: 13.6 %
SODIUM SERPL-SCNC: 137 MMOL/L
WBC # FLD AUTO: 9.62 K/UL

## 2025-05-09 NOTE — HISTORY OF PRESENT ILLNESS
[No Pertinent Cardiac History] : no history of aortic stenosis, atrial fibrillation, coronary artery disease, recent myocardial infarction, or implantable device/pacemaker [No Pertinent Pulmonary History] : no history of asthma, COPD, sleep apnea, or smoking [No Adverse Anesthesia Reaction] : no adverse anesthesia reaction in self or family member [FreeTextEntry1] : breast augmentation [FreeTextEntry2] : 5/26/2025 [FreeTextEntry3] : Dr. Chowdary  [FreeTextEntry4] : 39F seen in the office as new patient for presurgical medical clearance. States on 5/26/2025 she is having breast lift and implant exchange. Had initial surgery in 2022. States she is unhappy with the appearance and wants revision. Denies any pain/masses to breasts. Had breast US in 12/2024 and all was WNL. Denies any shortness of breath, chest pain, dizziness, rashes, URI symptoms. Has had general anesthesia in the past and has had no issues. Takes lorazepam as needed for anxiety as well as zofran. Surgery is being done in [place]. She states she has presurgical appt with doctors office prior to procedure date. Denies smoking/alcohol use. Denies history of sleep apnea/snoring. No loose teeth. No history of asthma. Denies chance of pregnancy .A&Ox3 in no distress.

## 2025-05-09 NOTE — PLAN
[FreeTextEntry1] : patient well appearing labs and EKG WNL patient cleared medically for procedure information faxed to surgeon on 5/9/2025 patient to return for full physical after procedure all questions answered

## 2025-05-17 ENCOUNTER — APPOINTMENT (OUTPATIENT)
Dept: CT IMAGING | Facility: CLINIC | Age: 40
End: 2025-05-17
Payer: COMMERCIAL

## 2025-05-17 PROCEDURE — 74177 CT ABD & PELVIS W/CONTRAST: CPT

## 2025-06-02 ENCOUNTER — APPOINTMENT (OUTPATIENT)
Dept: SURGERY | Facility: CLINIC | Age: 40
End: 2025-06-02
Payer: COMMERCIAL

## 2025-06-02 DIAGNOSIS — Z98.82 BREAST IMPLANT STATUS: ICD-10-CM

## 2025-06-02 PROCEDURE — 99212 OFFICE O/P EST SF 10 MIN: CPT

## 2025-06-03 ENCOUNTER — INPATIENT (INPATIENT)
Facility: HOSPITAL | Age: 40
LOS: 3 days | Discharge: HOME | DRG: 584 | End: 2025-06-07
Attending: HOSPITALIST | Admitting: INTERNAL MEDICINE
Payer: COMMERCIAL

## 2025-06-03 VITALS
RESPIRATION RATE: 16 BRPM | TEMPERATURE: 99 F | HEART RATE: 88 BPM | WEIGHT: 123.9 LBS | OXYGEN SATURATION: 100 % | HEIGHT: 66 IN | SYSTOLIC BLOOD PRESSURE: 121 MMHG | DIASTOLIC BLOOD PRESSURE: 82 MMHG

## 2025-06-03 DIAGNOSIS — Z98.891 HISTORY OF UTERINE SCAR FROM PREVIOUS SURGERY: Chronic | ICD-10-CM

## 2025-06-03 LAB
ALBUMIN SERPL ELPH-MCNC: 3.4 G/DL — SIGNIFICANT CHANGE UP (ref 3.3–5)
ALP SERPL-CCNC: 60 U/L — SIGNIFICANT CHANGE UP (ref 40–120)
ALT FLD-CCNC: 31 U/L — SIGNIFICANT CHANGE UP (ref 10–45)
ANION GAP SERPL CALC-SCNC: 5 MMOL/L — SIGNIFICANT CHANGE UP (ref 5–17)
APTT BLD: 23.7 SEC — LOW (ref 26.1–36.8)
AST SERPL-CCNC: 28 U/L — SIGNIFICANT CHANGE UP (ref 10–40)
BASOPHILS # BLD AUTO: 0.07 K/UL — SIGNIFICANT CHANGE UP (ref 0–0.2)
BASOPHILS NFR BLD AUTO: 0.3 % — SIGNIFICANT CHANGE UP (ref 0–2)
BILIRUB SERPL-MCNC: 0.4 MG/DL — SIGNIFICANT CHANGE UP (ref 0.2–1.2)
BUN SERPL-MCNC: 11 MG/DL — SIGNIFICANT CHANGE UP (ref 7–23)
CALCIUM SERPL-MCNC: 9.1 MG/DL — SIGNIFICANT CHANGE UP (ref 8.4–10.5)
CHLORIDE SERPL-SCNC: 100 MMOL/L — SIGNIFICANT CHANGE UP (ref 96–108)
CO2 SERPL-SCNC: 30 MMOL/L — SIGNIFICANT CHANGE UP (ref 22–31)
CREAT SERPL-MCNC: 0.54 MG/DL — SIGNIFICANT CHANGE UP (ref 0.5–1.3)
EGFR: 120 ML/MIN/1.73M2 — SIGNIFICANT CHANGE UP
EGFR: 120 ML/MIN/1.73M2 — SIGNIFICANT CHANGE UP
EOSINOPHIL # BLD AUTO: 0.03 K/UL — SIGNIFICANT CHANGE UP (ref 0–0.5)
EOSINOPHIL NFR BLD AUTO: 0.1 % — SIGNIFICANT CHANGE UP (ref 0–6)
GLUCOSE SERPL-MCNC: 127 MG/DL — HIGH (ref 70–99)
HCG SERPL-ACNC: <1 MIU/ML — SIGNIFICANT CHANGE UP
HCT VFR BLD CALC: 34.2 % — LOW (ref 34.5–45)
HGB BLD-MCNC: 11 G/DL — LOW (ref 11.5–15.5)
IMM GRANULOCYTES NFR BLD AUTO: 0.5 % — SIGNIFICANT CHANGE UP (ref 0–0.9)
INR BLD: 1.03 RATIO — SIGNIFICANT CHANGE UP (ref 0.85–1.16)
LACTATE SERPL-SCNC: 2.6 MMOL/L — HIGH (ref 0.7–2)
LYMPHOCYTES # BLD AUTO: 10.6 % — LOW (ref 13–44)
LYMPHOCYTES # BLD AUTO: 2.51 K/UL — SIGNIFICANT CHANGE UP (ref 1–3.3)
MCHC RBC-ENTMCNC: 29 PG — SIGNIFICANT CHANGE UP (ref 27–34)
MCHC RBC-ENTMCNC: 32.2 G/DL — SIGNIFICANT CHANGE UP (ref 32–36)
MCV RBC AUTO: 90.2 FL — SIGNIFICANT CHANGE UP (ref 80–100)
MONOCYTES # BLD AUTO: 1.44 K/UL — HIGH (ref 0–0.9)
MONOCYTES NFR BLD AUTO: 6.1 % — SIGNIFICANT CHANGE UP (ref 2–14)
NEUTROPHILS # BLD AUTO: 19.6 K/UL — HIGH (ref 1.8–7.4)
NEUTROPHILS NFR BLD AUTO: 82.4 % — HIGH (ref 43–77)
NRBC BLD AUTO-RTO: 0 /100 WBCS — SIGNIFICANT CHANGE UP (ref 0–0)
PLATELET # BLD AUTO: 541 K/UL — HIGH (ref 150–400)
POTASSIUM SERPL-MCNC: 4.4 MMOL/L — SIGNIFICANT CHANGE UP (ref 3.5–5.3)
POTASSIUM SERPL-SCNC: 4.4 MMOL/L — SIGNIFICANT CHANGE UP (ref 3.5–5.3)
PROT SERPL-MCNC: 7.1 G/DL — SIGNIFICANT CHANGE UP (ref 6–8.3)
PROTHROM AB SERPL-ACNC: 12.1 SEC — SIGNIFICANT CHANGE UP (ref 9.9–13.4)
RBC # BLD: 3.79 M/UL — LOW (ref 3.8–5.2)
RBC # FLD: 13.7 % — SIGNIFICANT CHANGE UP (ref 10.3–14.5)
SODIUM SERPL-SCNC: 135 MMOL/L — SIGNIFICANT CHANGE UP (ref 135–145)
WBC # BLD: 23.78 K/UL — HIGH (ref 3.8–10.5)
WBC # FLD AUTO: 23.78 K/UL — HIGH (ref 3.8–10.5)

## 2025-06-03 PROCEDURE — 99285 EMERGENCY DEPT VISIT HI MDM: CPT

## 2025-06-03 PROCEDURE — 76641 ULTRASOUND BREAST COMPLETE: CPT | Mod: 26,LT

## 2025-06-03 RX ORDER — ACETAMINOPHEN 500 MG/5ML
1000 LIQUID (ML) ORAL ONCE
Refills: 0 | Status: COMPLETED | OUTPATIENT
Start: 2025-06-03 | End: 2025-06-03

## 2025-06-03 RX ORDER — KETOROLAC TROMETHAMINE 30 MG/ML
15 INJECTION, SOLUTION INTRAMUSCULAR; INTRAVENOUS ONCE
Refills: 0 | Status: DISCONTINUED | OUTPATIENT
Start: 2025-06-03 | End: 2025-06-03

## 2025-06-03 RX ORDER — VANCOMYCIN HCL IN 5 % DEXTROSE 1.5G/250ML
1000 PLASTIC BAG, INJECTION (ML) INTRAVENOUS ONCE
Refills: 0 | Status: COMPLETED | OUTPATIENT
Start: 2025-06-03 | End: 2025-06-03

## 2025-06-03 RX ORDER — ONDANSETRON HCL/PF 4 MG/2 ML
4 VIAL (ML) INJECTION ONCE
Refills: 0 | Status: COMPLETED | OUTPATIENT
Start: 2025-06-03 | End: 2025-06-03

## 2025-06-03 RX ADMIN — Medication 400 MILLIGRAM(S): at 22:13

## 2025-06-03 RX ADMIN — Medication 1000 MILLILITER(S): at 22:13

## 2025-06-03 RX ADMIN — Medication 4 MILLIGRAM(S): at 22:48

## 2025-06-03 RX ADMIN — Medication 1000 MILLILITER(S): at 22:12

## 2025-06-03 RX ADMIN — KETOROLAC TROMETHAMINE 15 MILLIGRAM(S): 30 INJECTION, SOLUTION INTRAMUSCULAR; INTRAVENOUS at 22:47

## 2025-06-04 DIAGNOSIS — A41.9 SEPSIS, UNSPECIFIED ORGANISM: ICD-10-CM

## 2025-06-04 LAB
ANION GAP SERPL CALC-SCNC: 9 MMOL/L — SIGNIFICANT CHANGE UP (ref 5–17)
BLD GP AB SCN SERPL QL: SIGNIFICANT CHANGE UP
BUN SERPL-MCNC: 5 MG/DL — LOW (ref 7–23)
CALCIUM SERPL-MCNC: 8.8 MG/DL — SIGNIFICANT CHANGE UP (ref 8.4–10.5)
CHLORIDE SERPL-SCNC: 106 MMOL/L — SIGNIFICANT CHANGE UP (ref 96–108)
CO2 SERPL-SCNC: 26 MMOL/L — SIGNIFICANT CHANGE UP (ref 22–31)
CREAT SERPL-MCNC: 0.58 MG/DL — SIGNIFICANT CHANGE UP (ref 0.5–1.3)
EGFR: 118 ML/MIN/1.73M2 — SIGNIFICANT CHANGE UP
EGFR: 118 ML/MIN/1.73M2 — SIGNIFICANT CHANGE UP
GLUCOSE SERPL-MCNC: 104 MG/DL — HIGH (ref 70–99)
HCT VFR BLD CALC: 24.8 % — LOW (ref 34.5–45)
HCT VFR BLD CALC: 26.6 % — LOW (ref 34.5–45)
HGB BLD-MCNC: 8.1 G/DL — LOW (ref 11.5–15.5)
HGB BLD-MCNC: 8.6 G/DL — LOW (ref 11.5–15.5)
LACTATE SERPL-SCNC: 1.3 MMOL/L — SIGNIFICANT CHANGE UP (ref 0.7–2)
MCHC RBC-ENTMCNC: 29.6 PG — SIGNIFICANT CHANGE UP (ref 27–34)
MCHC RBC-ENTMCNC: 29.6 PG — SIGNIFICANT CHANGE UP (ref 27–34)
MCHC RBC-ENTMCNC: 32.3 G/DL — SIGNIFICANT CHANGE UP (ref 32–36)
MCHC RBC-ENTMCNC: 32.7 G/DL — SIGNIFICANT CHANGE UP (ref 32–36)
MCV RBC AUTO: 90.5 FL — SIGNIFICANT CHANGE UP (ref 80–100)
MCV RBC AUTO: 91.4 FL — SIGNIFICANT CHANGE UP (ref 80–100)
NRBC BLD AUTO-RTO: 0 /100 WBCS — SIGNIFICANT CHANGE UP (ref 0–0)
NRBC BLD AUTO-RTO: 0 /100 WBCS — SIGNIFICANT CHANGE UP (ref 0–0)
PLATELET # BLD AUTO: 410 K/UL — HIGH (ref 150–400)
PLATELET # BLD AUTO: 452 K/UL — HIGH (ref 150–400)
POTASSIUM SERPL-MCNC: 3.8 MMOL/L — SIGNIFICANT CHANGE UP (ref 3.5–5.3)
POTASSIUM SERPL-SCNC: 3.8 MMOL/L — SIGNIFICANT CHANGE UP (ref 3.5–5.3)
PROCALCITONIN SERPL-MCNC: 0.04 NG/ML — SIGNIFICANT CHANGE UP
RBC # BLD: 2.74 M/UL — LOW (ref 3.8–5.2)
RBC # BLD: 2.91 M/UL — LOW (ref 3.8–5.2)
RBC # FLD: 13.9 % — SIGNIFICANT CHANGE UP (ref 10.3–14.5)
RBC # FLD: 14 % — SIGNIFICANT CHANGE UP (ref 10.3–14.5)
SODIUM SERPL-SCNC: 141 MMOL/L — SIGNIFICANT CHANGE UP (ref 135–145)
VANCOMYCIN TROUGH SERPL-MCNC: 11.3 UG/ML — SIGNIFICANT CHANGE UP (ref 10–20)
WBC # BLD: 12.84 K/UL — HIGH (ref 3.8–10.5)
WBC # BLD: 13.17 K/UL — HIGH (ref 3.8–10.5)
WBC # FLD AUTO: 12.84 K/UL — HIGH (ref 3.8–10.5)
WBC # FLD AUTO: 13.17 K/UL — HIGH (ref 3.8–10.5)

## 2025-06-04 PROCEDURE — 71260 CT THORAX DX C+: CPT | Mod: 26

## 2025-06-04 PROCEDURE — 99223 1ST HOSP IP/OBS HIGH 75: CPT

## 2025-06-04 PROCEDURE — 93010 ELECTROCARDIOGRAM REPORT: CPT

## 2025-06-04 DEVICE — CLIP APPLIER ETHICON LIGACLIP 9 3/8" SMALL
Type: IMPLANTABLE DEVICE | Site: LEFT | Status: NON-FUNCTIONAL
Removed: 2025-06-04

## 2025-06-04 RX ORDER — ONDANSETRON HCL/PF 4 MG/2 ML
4 VIAL (ML) INJECTION EVERY 8 HOURS
Refills: 0 | Status: DISCONTINUED | OUTPATIENT
Start: 2025-06-04 | End: 2025-06-05

## 2025-06-04 RX ORDER — PIPERACILLIN-TAZO-DEXTROSE,ISO 3.375G/5
3.38 IV SOLUTION, PIGGYBACK PREMIX FROZEN(ML) INTRAVENOUS EVERY 8 HOURS
Refills: 0 | Status: DISCONTINUED | OUTPATIENT
Start: 2025-06-04 | End: 2025-06-05

## 2025-06-04 RX ORDER — SENNA 187 MG
2 TABLET ORAL AT BEDTIME
Refills: 0 | Status: DISCONTINUED | OUTPATIENT
Start: 2025-06-04 | End: 2025-06-05

## 2025-06-04 RX ORDER — PIPERACILLIN-TAZO-DEXTROSE,ISO 3.375G/5
3.38 IV SOLUTION, PIGGYBACK PREMIX FROZEN(ML) INTRAVENOUS ONCE
Refills: 0 | Status: DISCONTINUED | OUTPATIENT
Start: 2025-06-04 | End: 2025-06-04

## 2025-06-04 RX ORDER — SODIUM CHLORIDE 9 G/1000ML
1000 INJECTION, SOLUTION INTRAVENOUS
Refills: 0 | Status: DISCONTINUED | OUTPATIENT
Start: 2025-06-04 | End: 2025-06-05

## 2025-06-04 RX ORDER — LORAZEPAM 4 MG/ML
0.5 VIAL (ML) INJECTION
Refills: 0 | Status: DISCONTINUED | OUTPATIENT
Start: 2025-06-04 | End: 2025-06-05

## 2025-06-04 RX ORDER — HYDROMORPHONE/SOD CHLOR,ISO/PF 2 MG/10 ML
0.5 SYRINGE (ML) INJECTION ONCE
Refills: 0 | Status: DISCONTINUED | OUTPATIENT
Start: 2025-06-04 | End: 2025-06-04

## 2025-06-04 RX ORDER — PIPERACILLIN-TAZO-DEXTROSE,ISO 3.375G/5
3.38 IV SOLUTION, PIGGYBACK PREMIX FROZEN(ML) INTRAVENOUS ONCE
Refills: 0 | Status: COMPLETED | OUTPATIENT
Start: 2025-06-04 | End: 2025-06-04

## 2025-06-04 RX ORDER — VANCOMYCIN HCL IN 5 % DEXTROSE 1.5G/250ML
1000 PLASTIC BAG, INJECTION (ML) INTRAVENOUS EVERY 8 HOURS
Refills: 0 | Status: DISCONTINUED | OUTPATIENT
Start: 2025-06-04 | End: 2025-06-05

## 2025-06-04 RX ORDER — ACETAMINOPHEN 500 MG/5ML
650 LIQUID (ML) ORAL EVERY 6 HOURS
Refills: 0 | Status: DISCONTINUED | OUTPATIENT
Start: 2025-06-04 | End: 2025-06-05

## 2025-06-04 RX ORDER — SODIUM CHLORIDE 9 G/1000ML
1000 INJECTION, SOLUTION INTRAVENOUS
Refills: 0 | Status: DISCONTINUED | OUTPATIENT
Start: 2025-06-04 | End: 2025-06-04

## 2025-06-04 RX ADMIN — Medication 0.5 MILLIGRAM(S): at 03:17

## 2025-06-04 RX ADMIN — Medication 250 MILLIGRAM(S): at 14:41

## 2025-06-04 RX ADMIN — Medication 25 GRAM(S): at 16:45

## 2025-06-04 RX ADMIN — Medication 2 MILLIGRAM(S): at 18:35

## 2025-06-04 RX ADMIN — Medication 250 MILLIGRAM(S): at 01:18

## 2025-06-04 RX ADMIN — SODIUM CHLORIDE 80 MILLILITER(S): 9 INJECTION, SOLUTION INTRAVENOUS at 09:22

## 2025-06-04 RX ADMIN — Medication 2 TABLET(S): at 22:13

## 2025-06-04 RX ADMIN — Medication 200 GRAM(S): at 04:53

## 2025-06-04 RX ADMIN — Medication 25 GRAM(S): at 22:13

## 2025-06-04 RX ADMIN — SODIUM CHLORIDE 80 MILLILITER(S): 9 INJECTION, SOLUTION INTRAVENOUS at 14:36

## 2025-06-04 RX ADMIN — Medication 2 MILLIGRAM(S): at 22:13

## 2025-06-04 RX ADMIN — Medication 250 MILLIGRAM(S): at 23:13

## 2025-06-04 RX ADMIN — Medication 650 MILLIGRAM(S): at 14:37

## 2025-06-04 RX ADMIN — Medication 0.5 MILLIGRAM(S): at 03:47

## 2025-06-04 RX ADMIN — Medication 2 MILLIGRAM(S): at 12:59

## 2025-06-04 RX ADMIN — Medication 250 MILLIGRAM(S): at 09:22

## 2025-06-04 RX ADMIN — Medication 2 MILLIGRAM(S): at 09:30

## 2025-06-04 RX ADMIN — SODIUM CHLORIDE 80 MILLILITER(S): 9 INJECTION, SOLUTION INTRAVENOUS at 04:53

## 2025-06-04 RX ADMIN — Medication 650 MILLIGRAM(S): at 15:00

## 2025-06-04 RX ADMIN — Medication 2 MILLIGRAM(S): at 22:50

## 2025-06-04 RX ADMIN — Medication 650 MILLIGRAM(S): at 20:54

## 2025-06-04 RX ADMIN — Medication 2 MILLIGRAM(S): at 14:00

## 2025-06-04 RX ADMIN — Medication 2 MILLIGRAM(S): at 17:51

## 2025-06-04 RX ADMIN — SODIUM CHLORIDE 100 MILLILITER(S): 9 INJECTION, SOLUTION INTRAVENOUS at 20:54

## 2025-06-04 RX ADMIN — Medication 2 MILLIGRAM(S): at 10:30

## 2025-06-04 NOTE — ASSESSMENT
[FreeTextEntry1] : * Surgery performed by Dr. Chowdary, in [facility] on 05/26/2025, as per patient. -Primary dressing removed, incisions appear clean and closed, except for scant bloody drainage yelena-areolar. -Subcuticular suture ends clipped, and steri-strips applied. -The patient tolerated the procedure well. I, Dr. Porfirio Connolly, personally performed the evaluation and management (E/M) services for this established patient who presents today with (a) new problem(s)/exacerbation of (an) existing condition(s). That E/M includes conducting the clinically appropriate interval history &/or exam, assessing all new/exacerbated conditions, and establishing a new plan of care. Today, my PA, Shae Stratton, was here to observe my evaluation and management service for this new problem/exacerbated condition and follow the plan of care established by me going forward.

## 2025-06-04 NOTE — HISTORY OF PRESENT ILLNESS
[FreeTextEntry1] : This 38 yo F s/p Breast Lift Surgery with Implantation of Silicone Implants Bilateral on 05/26/2025 in [place], [place] with Dr. Chowdary.  The patient states she saw her surgeon on POD # 1 and then she traveled back to [place].  The patient denies fever, chills or wound drainage.  The patient completed a post-op 5-day course of Augmentin yesterday.   The patient presents to the office for examination of incisions.  RISK FACTORS No FH of Breast CA [id number] yo First Birth 33 yo No h/o breast biopsies

## 2025-06-04 NOTE — PHYSICAL EXAM
[Normocephalic] : normocephalic [Atraumatic] : atraumatic [No Supraclavicular Adenopathy] : no supraclavicular adenopathy [Clear to Auscultation Bilat] : clear to auscultation bilaterally [Symmetrical] : symmetrical [No Axillary Lymphadenopathy] : no left axillary lymphadenopathy [No Edema] : no edema [No Rashes] : no rashes [No Ulceration] : no ulceration [de-identified] : Heavy tape covering incisions, removed with care, incisions bilateral breasts clean and closed mild bloody drainage from yelena-areolar incisions

## 2025-06-04 NOTE — ASSESSMENT
[FreeTextEntry1] : * Surgery performed by Dr. Chowdary, in [facility] on 05/26/2025, as per patient. -Primary dressing removed, incisions appear clean and closed, except for scant bloody drainage yelena-areolar. -Subcuticular suture ends clipped, and steri-strips applied. -The patient tolerated the procedure well. I, Dr. Porfirio Connolly, personally performed the evaluation and management (E/M) services for this established patient who presents today with (a) new problem(s)/exacerbation of (an) existing condition(s). That E/M includes conducting the clinically appropriate interval history &/or exam, assessing all new/exacerbated conditions, and establishing a new plan of care. Today, my PA, Shae Stratton, was here to observe my evaluation and management service for this new problem/exacerbated condition and follow the plan of care established by me going forward.

## 2025-06-04 NOTE — HISTORY OF PRESENT ILLNESS
[FreeTextEntry1] : This 40 yo F s/p Breast Lift Surgery with Implantation of Silicone Implants Bilateral on 05/26/2025 in [place], [place] with Dr. Chowdary.  The patient states she saw her surgeon on POD # 1 and then she traveled back to [place].  The patient denies fever, chills or wound drainage.  The patient completed a post-op 5-day course of Augmentin yesterday.   The patient presents to the office for examination of incisions.  RISK FACTORS No FH of Breast CA [id number] yo First Birth 33 yo No h/o breast biopsies

## 2025-06-04 NOTE — ADDENDUM
[FreeTextEntry1] : -Recommend the patient follow-up with a Plastic Surgeon, who is more familiar with the post-op management of breast augmentation. -Explain to patient that she can follow up at this office if she cannot find a Plastic Surgeon to see her during her post-op course.

## 2025-06-04 NOTE — PHYSICAL EXAM
[Normocephalic] : normocephalic [Atraumatic] : atraumatic [No Supraclavicular Adenopathy] : no supraclavicular adenopathy [Clear to Auscultation Bilat] : clear to auscultation bilaterally [Symmetrical] : symmetrical [No Axillary Lymphadenopathy] : no left axillary lymphadenopathy [No Edema] : no edema [No Rashes] : no rashes [No Ulceration] : no ulceration [de-identified] : Heavy tape covering incisions, removed with care, incisions bilateral breasts clean and closed mild bloody drainage from yelena-areolar incisions

## 2025-06-05 ENCOUNTER — TRANSCRIPTION ENCOUNTER (OUTPATIENT)
Age: 40
End: 2025-06-05

## 2025-06-05 LAB
ANION GAP SERPL CALC-SCNC: 8 MMOL/L — SIGNIFICANT CHANGE UP (ref 5–17)
BASOPHILS # BLD AUTO: 0.03 K/UL — SIGNIFICANT CHANGE UP (ref 0–0.2)
BASOPHILS NFR BLD AUTO: 0.3 % — SIGNIFICANT CHANGE UP (ref 0–2)
BUN SERPL-MCNC: 5 MG/DL — LOW (ref 7–23)
CALCIUM SERPL-MCNC: 8.6 MG/DL — SIGNIFICANT CHANGE UP (ref 8.4–10.5)
CHLORIDE SERPL-SCNC: 105 MMOL/L — SIGNIFICANT CHANGE UP (ref 96–108)
CO2 SERPL-SCNC: 28 MMOL/L — SIGNIFICANT CHANGE UP (ref 22–31)
CREAT SERPL-MCNC: 0.59 MG/DL — SIGNIFICANT CHANGE UP (ref 0.5–1.3)
EGFR: 117 ML/MIN/1.73M2 — SIGNIFICANT CHANGE UP
EGFR: 117 ML/MIN/1.73M2 — SIGNIFICANT CHANGE UP
EOSINOPHIL # BLD AUTO: 0.1 K/UL — SIGNIFICANT CHANGE UP (ref 0–0.5)
EOSINOPHIL NFR BLD AUTO: 1.1 % — SIGNIFICANT CHANGE UP (ref 0–6)
GLUCOSE SERPL-MCNC: 131 MG/DL — HIGH (ref 70–99)
HCT VFR BLD CALC: 24.9 % — LOW (ref 34.5–45)
HCT VFR BLD CALC: 31 % — LOW (ref 34.5–45)
HGB BLD-MCNC: 10.2 G/DL — LOW (ref 11.5–15.5)
HGB BLD-MCNC: 8 G/DL — LOW (ref 11.5–15.5)
IMM GRANULOCYTES NFR BLD AUTO: 0.3 % — SIGNIFICANT CHANGE UP (ref 0–0.9)
LYMPHOCYTES # BLD AUTO: 2.33 K/UL — SIGNIFICANT CHANGE UP (ref 1–3.3)
LYMPHOCYTES # BLD AUTO: 25.2 % — SIGNIFICANT CHANGE UP (ref 13–44)
MCHC RBC-ENTMCNC: 28.8 PG — SIGNIFICANT CHANGE UP (ref 27–34)
MCHC RBC-ENTMCNC: 29.4 PG — SIGNIFICANT CHANGE UP (ref 27–34)
MCHC RBC-ENTMCNC: 32.1 G/DL — SIGNIFICANT CHANGE UP (ref 32–36)
MCHC RBC-ENTMCNC: 32.9 G/DL — SIGNIFICANT CHANGE UP (ref 32–36)
MCV RBC AUTO: 87.6 FL — SIGNIFICANT CHANGE UP (ref 80–100)
MCV RBC AUTO: 91.5 FL — SIGNIFICANT CHANGE UP (ref 80–100)
MONOCYTES # BLD AUTO: 0.92 K/UL — HIGH (ref 0–0.9)
MONOCYTES NFR BLD AUTO: 10 % — SIGNIFICANT CHANGE UP (ref 2–14)
NEUTROPHILS # BLD AUTO: 5.82 K/UL — SIGNIFICANT CHANGE UP (ref 1.8–7.4)
NEUTROPHILS NFR BLD AUTO: 63.1 % — SIGNIFICANT CHANGE UP (ref 43–77)
NRBC BLD AUTO-RTO: 0 /100 WBCS — SIGNIFICANT CHANGE UP (ref 0–0)
NRBC BLD AUTO-RTO: 0 /100 WBCS — SIGNIFICANT CHANGE UP (ref 0–0)
PLATELET # BLD AUTO: 413 K/UL — HIGH (ref 150–400)
PLATELET # BLD AUTO: 441 K/UL — HIGH (ref 150–400)
POTASSIUM SERPL-MCNC: 3.5 MMOL/L — SIGNIFICANT CHANGE UP (ref 3.5–5.3)
POTASSIUM SERPL-SCNC: 3.5 MMOL/L — SIGNIFICANT CHANGE UP (ref 3.5–5.3)
RBC # BLD: 2.72 M/UL — LOW (ref 3.8–5.2)
RBC # BLD: 3.54 M/UL — LOW (ref 3.8–5.2)
RBC # FLD: 13.9 % — SIGNIFICANT CHANGE UP (ref 10.3–14.5)
RBC # FLD: 14.7 % — HIGH (ref 10.3–14.5)
SODIUM SERPL-SCNC: 141 MMOL/L — SIGNIFICANT CHANGE UP (ref 135–145)
WBC # BLD: 12.07 K/UL — HIGH (ref 3.8–10.5)
WBC # BLD: 9.23 K/UL — SIGNIFICANT CHANGE UP (ref 3.8–10.5)
WBC # FLD AUTO: 12.07 K/UL — HIGH (ref 3.8–10.5)
WBC # FLD AUTO: 9.23 K/UL — SIGNIFICANT CHANGE UP (ref 3.8–10.5)

## 2025-06-05 PROCEDURE — 99233 SBSQ HOSP IP/OBS HIGH 50: CPT

## 2025-06-05 RX ORDER — KETOROLAC TROMETHAMINE 30 MG/ML
15 INJECTION, SOLUTION INTRAMUSCULAR; INTRAVENOUS ONCE
Refills: 0 | Status: DISCONTINUED | OUTPATIENT
Start: 2025-06-05 | End: 2025-06-05

## 2025-06-05 RX ORDER — OXYCODONE HYDROCHLORIDE 30 MG/1
10 TABLET ORAL EVERY 6 HOURS
Refills: 0 | Status: DISCONTINUED | OUTPATIENT
Start: 2025-06-05 | End: 2025-06-06

## 2025-06-05 RX ORDER — HYDROMORPHONE/SOD CHLOR,ISO/PF 2 MG/10 ML
0.5 SYRINGE (ML) INJECTION EVERY 4 HOURS
Refills: 0 | Status: DISCONTINUED | OUTPATIENT
Start: 2025-06-05 | End: 2025-06-05

## 2025-06-05 RX ORDER — HYDROMORPHONE/SOD CHLOR,ISO/PF 2 MG/10 ML
0.5 SYRINGE (ML) INJECTION ONCE
Refills: 0 | Status: DISCONTINUED | OUTPATIENT
Start: 2025-06-05 | End: 2025-06-05

## 2025-06-05 RX ORDER — SODIUM CHLORIDE 9 G/1000ML
1000 INJECTION, SOLUTION INTRAVENOUS
Refills: 0 | Status: DISCONTINUED | OUTPATIENT
Start: 2025-06-05 | End: 2025-06-05

## 2025-06-05 RX ORDER — ACETAMINOPHEN 500 MG/5ML
1000 LIQUID (ML) ORAL ONCE
Refills: 0 | Status: DISCONTINUED | OUTPATIENT
Start: 2025-06-05 | End: 2025-06-05

## 2025-06-05 RX ORDER — HYDROMORPHONE/SOD CHLOR,ISO/PF 2 MG/10 ML
1 SYRINGE (ML) INJECTION
Refills: 0 | Status: DISCONTINUED | OUTPATIENT
Start: 2025-06-05 | End: 2025-06-05

## 2025-06-05 RX ORDER — CEFAZOLIN SODIUM IN 0.9 % NACL 3 G/100 ML
2000 INTRAVENOUS SOLUTION, PIGGYBACK (ML) INTRAVENOUS EVERY 8 HOURS
Refills: 0 | Status: DISCONTINUED | OUTPATIENT
Start: 2025-06-05 | End: 2025-06-07

## 2025-06-05 RX ORDER — ONDANSETRON HCL/PF 4 MG/2 ML
4 VIAL (ML) INJECTION ONCE
Refills: 0 | Status: DISCONTINUED | OUTPATIENT
Start: 2025-06-05 | End: 2025-06-05

## 2025-06-05 RX ORDER — HYDROMORPHONE/SOD CHLOR,ISO/PF 2 MG/10 ML
0.5 SYRINGE (ML) INJECTION
Refills: 0 | Status: DISCONTINUED | OUTPATIENT
Start: 2025-06-05 | End: 2025-06-05

## 2025-06-05 RX ORDER — ACETAMINOPHEN 500 MG/5ML
1000 LIQUID (ML) ORAL ONCE
Refills: 0 | Status: COMPLETED | OUTPATIENT
Start: 2025-06-05 | End: 2025-06-05

## 2025-06-05 RX ORDER — OXYCODONE HYDROCHLORIDE 30 MG/1
5 TABLET ORAL EVERY 6 HOURS
Refills: 0 | Status: DISCONTINUED | OUTPATIENT
Start: 2025-06-05 | End: 2025-06-06

## 2025-06-05 RX ORDER — SODIUM CHLORIDE 9 G/1000ML
1000 INJECTION, SOLUTION INTRAVENOUS
Refills: 0 | Status: DISCONTINUED | OUTPATIENT
Start: 2025-06-05 | End: 2025-06-06

## 2025-06-05 RX ORDER — ACETAMINOPHEN 500 MG/5ML
750 LIQUID (ML) ORAL EVERY 8 HOURS
Refills: 0 | Status: COMPLETED | OUTPATIENT
Start: 2025-06-06 | End: 2025-06-07

## 2025-06-05 RX ADMIN — Medication 0.5 MILLIGRAM(S): at 04:04

## 2025-06-05 RX ADMIN — Medication 400 MILLIGRAM(S): at 02:15

## 2025-06-05 RX ADMIN — Medication 250 MILLIGRAM(S): at 14:43

## 2025-06-05 RX ADMIN — Medication 1000 MILLIGRAM(S): at 02:50

## 2025-06-05 RX ADMIN — Medication 25 GRAM(S): at 06:49

## 2025-06-05 RX ADMIN — Medication 2 MILLIGRAM(S): at 11:54

## 2025-06-05 RX ADMIN — Medication 100 MILLIGRAM(S): at 22:06

## 2025-06-05 RX ADMIN — KETOROLAC TROMETHAMINE 15 MILLIGRAM(S): 30 INJECTION, SOLUTION INTRAMUSCULAR; INTRAVENOUS at 01:00

## 2025-06-05 RX ADMIN — Medication 2 MILLIGRAM(S): at 06:49

## 2025-06-05 RX ADMIN — Medication 4 MILLIGRAM(S): at 11:54

## 2025-06-05 RX ADMIN — Medication 0.5 MILLIGRAM(S): at 21:57

## 2025-06-05 RX ADMIN — Medication 0.5 MILLIGRAM(S): at 03:39

## 2025-06-05 RX ADMIN — Medication 250 MILLIGRAM(S): at 05:36

## 2025-06-05 RX ADMIN — Medication 0.5 MILLIGRAM(S): at 21:42

## 2025-06-05 RX ADMIN — Medication 1 APPLICATION(S): at 14:24

## 2025-06-05 RX ADMIN — SODIUM CHLORIDE 100 MILLILITER(S): 9 INJECTION, SOLUTION INTRAVENOUS at 10:21

## 2025-06-05 RX ADMIN — Medication 0.5 MILLIGRAM(S): at 19:34

## 2025-06-05 RX ADMIN — KETOROLAC TROMETHAMINE 15 MILLIGRAM(S): 30 INJECTION, SOLUTION INTRAMUSCULAR; INTRAVENOUS at 00:17

## 2025-06-05 RX ADMIN — Medication 0.5 MILLIGRAM(S): at 19:24

## 2025-06-05 RX ADMIN — Medication 2 MILLIGRAM(S): at 07:20

## 2025-06-05 RX ADMIN — Medication 25 GRAM(S): at 14:56

## 2025-06-05 NOTE — ED PROVIDER NOTE - NEURO NEGATIVE STATEMENT, MLM
Pt is on nurse schedule today. Spoke with dtr, Kindra, and she said pt is coughing, needs to check and see if she is retaining fluid in her lungs. History of pulmonary edema.  Dtr said pt has had the cough since Sunday, not productive. Dtr said pt is eating and drinking OK, pt has not gained weight, she is weighed daily, pt looks a little bloated.      Informed Kindra that nurse visit here is an inappropriate appt, this needs to be addressed by pt's cardiologist.  She said pt sees Dr. Gracia.  Pt has h/o HF and afib.    Dtr notified that this message is being sent to cardiology office, I am asking them to call dtr.      no loss of consciousness, no gait abnormality, no headache, no sensory deficits, and no weakness.

## 2025-06-06 DIAGNOSIS — N64.89 OTHER SPECIFIED DISORDERS OF BREAST: ICD-10-CM

## 2025-06-06 LAB
ANION GAP SERPL CALC-SCNC: 7 MMOL/L — SIGNIFICANT CHANGE UP (ref 5–17)
BUN SERPL-MCNC: 7 MG/DL — SIGNIFICANT CHANGE UP (ref 7–23)
CALCIUM SERPL-MCNC: 9 MG/DL — SIGNIFICANT CHANGE UP (ref 8.4–10.5)
CHLORIDE SERPL-SCNC: 104 MMOL/L — SIGNIFICANT CHANGE UP (ref 96–108)
CO2 SERPL-SCNC: 29 MMOL/L — SIGNIFICANT CHANGE UP (ref 22–31)
CREAT SERPL-MCNC: 0.47 MG/DL — LOW (ref 0.5–1.3)
EGFR: 124 ML/MIN/1.73M2 — SIGNIFICANT CHANGE UP
EGFR: 124 ML/MIN/1.73M2 — SIGNIFICANT CHANGE UP
GLUCOSE SERPL-MCNC: 100 MG/DL — HIGH (ref 70–99)
HCT VFR BLD CALC: 28.4 % — LOW (ref 34.5–45)
HCT VFR BLD CALC: 29.9 % — LOW (ref 34.5–45)
HGB BLD-MCNC: 9.3 G/DL — LOW (ref 11.5–15.5)
HGB BLD-MCNC: 9.8 G/DL — LOW (ref 11.5–15.5)
MCHC RBC-ENTMCNC: 28.8 PG — SIGNIFICANT CHANGE UP (ref 27–34)
MCHC RBC-ENTMCNC: 29.2 PG — SIGNIFICANT CHANGE UP (ref 27–34)
MCHC RBC-ENTMCNC: 32.7 G/DL — SIGNIFICANT CHANGE UP (ref 32–36)
MCHC RBC-ENTMCNC: 32.8 G/DL — SIGNIFICANT CHANGE UP (ref 32–36)
MCV RBC AUTO: 87.9 FL — SIGNIFICANT CHANGE UP (ref 80–100)
MCV RBC AUTO: 89.3 FL — SIGNIFICANT CHANGE UP (ref 80–100)
NRBC BLD AUTO-RTO: 0 /100 WBCS — SIGNIFICANT CHANGE UP (ref 0–0)
NRBC BLD AUTO-RTO: 0 /100 WBCS — SIGNIFICANT CHANGE UP (ref 0–0)
PLATELET # BLD AUTO: 448 K/UL — HIGH (ref 150–400)
PLATELET # BLD AUTO: 452 K/UL — HIGH (ref 150–400)
POTASSIUM SERPL-MCNC: 3.9 MMOL/L — SIGNIFICANT CHANGE UP (ref 3.5–5.3)
POTASSIUM SERPL-SCNC: 3.9 MMOL/L — SIGNIFICANT CHANGE UP (ref 3.5–5.3)
RBC # BLD: 3.18 M/UL — LOW (ref 3.8–5.2)
RBC # BLD: 3.4 M/UL — LOW (ref 3.8–5.2)
RBC # FLD: 14.7 % — HIGH (ref 10.3–14.5)
RBC # FLD: 14.7 % — HIGH (ref 10.3–14.5)
SODIUM SERPL-SCNC: 140 MMOL/L — SIGNIFICANT CHANGE UP (ref 135–145)
WBC # BLD: 12.01 K/UL — HIGH (ref 3.8–10.5)
WBC # BLD: 12.56 K/UL — HIGH (ref 3.8–10.5)
WBC # FLD AUTO: 12.01 K/UL — HIGH (ref 3.8–10.5)
WBC # FLD AUTO: 12.56 K/UL — HIGH (ref 3.8–10.5)

## 2025-06-06 PROCEDURE — 99232 SBSQ HOSP IP/OBS MODERATE 35: CPT

## 2025-06-06 RX ORDER — HYDROMORPHONE/SOD CHLOR,ISO/PF 2 MG/10 ML
0.5 SYRINGE (ML) INJECTION EVERY 4 HOURS
Refills: 0 | Status: DISCONTINUED | OUTPATIENT
Start: 2025-06-06 | End: 2025-06-06

## 2025-06-06 RX ORDER — LORAZEPAM 4 MG/ML
0.5 VIAL (ML) INJECTION
Refills: 0 | Status: DISCONTINUED | OUTPATIENT
Start: 2025-06-06 | End: 2025-06-07

## 2025-06-06 RX ORDER — MELATONIN 5 MG
3 TABLET ORAL AT BEDTIME
Refills: 0 | Status: DISCONTINUED | OUTPATIENT
Start: 2025-06-06 | End: 2025-06-07

## 2025-06-06 RX ORDER — HYDROMORPHONE/SOD CHLOR,ISO/PF 2 MG/10 ML
0.5 SYRINGE (ML) INJECTION EVERY 4 HOURS
Refills: 0 | Status: DISCONTINUED | OUTPATIENT
Start: 2025-06-06 | End: 2025-06-07

## 2025-06-06 RX ADMIN — Medication 0.5 MILLIGRAM(S): at 20:58

## 2025-06-06 RX ADMIN — Medication 0.5 MILLIGRAM(S): at 13:50

## 2025-06-06 RX ADMIN — Medication 100 MILLIGRAM(S): at 22:37

## 2025-06-06 RX ADMIN — Medication 0.5 MILLIGRAM(S): at 21:30

## 2025-06-06 RX ADMIN — Medication 750 MILLIGRAM(S): at 10:36

## 2025-06-06 RX ADMIN — Medication 100 MILLIGRAM(S): at 05:20

## 2025-06-06 RX ADMIN — Medication 0.5 MILLIGRAM(S): at 12:50

## 2025-06-06 RX ADMIN — Medication 750 MILLIGRAM(S): at 18:23

## 2025-06-06 RX ADMIN — Medication 400 MILLIGRAM(S): at 17:23

## 2025-06-06 RX ADMIN — Medication 400 MILLIGRAM(S): at 01:27

## 2025-06-06 RX ADMIN — Medication 0.5 MILLIGRAM(S): at 16:00

## 2025-06-06 RX ADMIN — Medication 100 MILLIGRAM(S): at 14:07

## 2025-06-06 RX ADMIN — Medication 0.5 MILLIGRAM(S): at 22:37

## 2025-06-06 RX ADMIN — Medication 0.5 MILLIGRAM(S): at 09:08

## 2025-06-06 RX ADMIN — Medication 400 MILLIGRAM(S): at 09:36

## 2025-06-06 RX ADMIN — Medication 0.5 MILLIGRAM(S): at 08:08

## 2025-06-06 RX ADMIN — Medication 750 MILLIGRAM(S): at 01:42

## 2025-06-06 RX ADMIN — Medication 0.5 MILLIGRAM(S): at 03:13

## 2025-06-06 RX ADMIN — Medication 0.5 MILLIGRAM(S): at 03:50

## 2025-06-07 ENCOUNTER — TRANSCRIPTION ENCOUNTER (OUTPATIENT)
Age: 40
End: 2025-06-07

## 2025-06-07 VITALS
SYSTOLIC BLOOD PRESSURE: 102 MMHG | OXYGEN SATURATION: 97 % | DIASTOLIC BLOOD PRESSURE: 73 MMHG | TEMPERATURE: 98 F | RESPIRATION RATE: 17 BRPM | HEART RATE: 73 BPM

## 2025-06-07 LAB
ALBUMIN SERPL ELPH-MCNC: 2.6 G/DL — LOW (ref 3.3–5)
ALP SERPL-CCNC: 47 U/L — SIGNIFICANT CHANGE UP (ref 40–120)
ALT FLD-CCNC: 17 U/L — SIGNIFICANT CHANGE UP (ref 10–45)
ANION GAP SERPL CALC-SCNC: 7 MMOL/L — SIGNIFICANT CHANGE UP (ref 5–17)
AST SERPL-CCNC: 13 U/L — SIGNIFICANT CHANGE UP (ref 10–40)
BILIRUB SERPL-MCNC: 0.2 MG/DL — SIGNIFICANT CHANGE UP (ref 0.2–1.2)
BUN SERPL-MCNC: 11 MG/DL — SIGNIFICANT CHANGE UP (ref 7–23)
CALCIUM SERPL-MCNC: 8.4 MG/DL — SIGNIFICANT CHANGE UP (ref 8.4–10.5)
CHLORIDE SERPL-SCNC: 104 MMOL/L — SIGNIFICANT CHANGE UP (ref 96–108)
CO2 SERPL-SCNC: 30 MMOL/L — SIGNIFICANT CHANGE UP (ref 22–31)
CREAT SERPL-MCNC: 0.59 MG/DL — SIGNIFICANT CHANGE UP (ref 0.5–1.3)
EGFR: 117 ML/MIN/1.73M2 — SIGNIFICANT CHANGE UP
EGFR: 117 ML/MIN/1.73M2 — SIGNIFICANT CHANGE UP
GLUCOSE SERPL-MCNC: 95 MG/DL — SIGNIFICANT CHANGE UP (ref 70–99)
HCT VFR BLD CALC: 27.6 % — LOW (ref 34.5–45)
HCT VFR BLD CALC: 29.1 % — LOW (ref 34.5–45)
HGB BLD-MCNC: 8.8 G/DL — LOW (ref 11.5–15.5)
HGB BLD-MCNC: 9.4 G/DL — LOW (ref 11.5–15.5)
MCHC RBC-ENTMCNC: 28.7 PG — SIGNIFICANT CHANGE UP (ref 27–34)
MCHC RBC-ENTMCNC: 28.8 PG — SIGNIFICANT CHANGE UP (ref 27–34)
MCHC RBC-ENTMCNC: 31.9 G/DL — LOW (ref 32–36)
MCHC RBC-ENTMCNC: 32.3 G/DL — SIGNIFICANT CHANGE UP (ref 32–36)
MCV RBC AUTO: 89.3 FL — SIGNIFICANT CHANGE UP (ref 80–100)
MCV RBC AUTO: 89.9 FL — SIGNIFICANT CHANGE UP (ref 80–100)
NRBC BLD AUTO-RTO: 0 /100 WBCS — SIGNIFICANT CHANGE UP (ref 0–0)
NRBC BLD AUTO-RTO: 0 /100 WBCS — SIGNIFICANT CHANGE UP (ref 0–0)
PLATELET # BLD AUTO: 441 K/UL — HIGH (ref 150–400)
PLATELET # BLD AUTO: 492 K/UL — HIGH (ref 150–400)
POTASSIUM SERPL-MCNC: 3.7 MMOL/L — SIGNIFICANT CHANGE UP (ref 3.5–5.3)
POTASSIUM SERPL-SCNC: 3.7 MMOL/L — SIGNIFICANT CHANGE UP (ref 3.5–5.3)
PROT SERPL-MCNC: 5.9 G/DL — LOW (ref 6–8.3)
RBC # BLD: 3.07 M/UL — LOW (ref 3.8–5.2)
RBC # BLD: 3.26 M/UL — LOW (ref 3.8–5.2)
RBC # FLD: 14.5 % — SIGNIFICANT CHANGE UP (ref 10.3–14.5)
RBC # FLD: 14.7 % — HIGH (ref 10.3–14.5)
SODIUM SERPL-SCNC: 141 MMOL/L — SIGNIFICANT CHANGE UP (ref 135–145)
WBC # BLD: 10.22 K/UL — SIGNIFICANT CHANGE UP (ref 3.8–10.5)
WBC # BLD: 9.54 K/UL — SIGNIFICANT CHANGE UP (ref 3.8–10.5)
WBC # FLD AUTO: 10.22 K/UL — SIGNIFICANT CHANGE UP (ref 3.8–10.5)
WBC # FLD AUTO: 9.54 K/UL — SIGNIFICANT CHANGE UP (ref 3.8–10.5)

## 2025-06-07 PROCEDURE — 85730 THROMBOPLASTIN TIME PARTIAL: CPT

## 2025-06-07 PROCEDURE — P9016: CPT

## 2025-06-07 PROCEDURE — C9399: CPT

## 2025-06-07 PROCEDURE — C1889: CPT

## 2025-06-07 PROCEDURE — 87040 BLOOD CULTURE FOR BACTERIA: CPT

## 2025-06-07 PROCEDURE — 99239 HOSP IP/OBS DSCHRG MGMT >30: CPT

## 2025-06-07 PROCEDURE — 86901 BLOOD TYPING SEROLOGIC RH(D): CPT

## 2025-06-07 PROCEDURE — 99285 EMERGENCY DEPT VISIT HI MDM: CPT

## 2025-06-07 PROCEDURE — 86923 COMPATIBILITY TEST ELECTRIC: CPT

## 2025-06-07 PROCEDURE — 93005 ELECTROCARDIOGRAM TRACING: CPT

## 2025-06-07 PROCEDURE — 96374 THER/PROPH/DIAG INJ IV PUSH: CPT

## 2025-06-07 PROCEDURE — 71260 CT THORAX DX C+: CPT

## 2025-06-07 PROCEDURE — 85025 COMPLETE CBC W/AUTO DIFF WBC: CPT

## 2025-06-07 PROCEDURE — 80202 ASSAY OF VANCOMYCIN: CPT

## 2025-06-07 PROCEDURE — 85610 PROTHROMBIN TIME: CPT

## 2025-06-07 PROCEDURE — 84145 PROCALCITONIN (PCT): CPT

## 2025-06-07 PROCEDURE — 36415 COLL VENOUS BLD VENIPUNCTURE: CPT

## 2025-06-07 PROCEDURE — 80053 COMPREHEN METABOLIC PANEL: CPT

## 2025-06-07 PROCEDURE — 80048 BASIC METABOLIC PNL TOTAL CA: CPT

## 2025-06-07 PROCEDURE — 85027 COMPLETE CBC AUTOMATED: CPT

## 2025-06-07 PROCEDURE — 86850 RBC ANTIBODY SCREEN: CPT

## 2025-06-07 PROCEDURE — 96375 TX/PRO/DX INJ NEW DRUG ADDON: CPT

## 2025-06-07 PROCEDURE — 86900 BLOOD TYPING SEROLOGIC ABO: CPT

## 2025-06-07 PROCEDURE — 84702 CHORIONIC GONADOTROPIN TEST: CPT

## 2025-06-07 PROCEDURE — 83605 ASSAY OF LACTIC ACID: CPT

## 2025-06-07 PROCEDURE — 76641 ULTRASOUND BREAST COMPLETE: CPT

## 2025-06-07 PROCEDURE — 36430 TRANSFUSION BLD/BLD COMPNT: CPT

## 2025-06-07 RX ORDER — OXYCODONE HYDROCHLORIDE 30 MG/1
1 TABLET ORAL
Qty: 20 | Refills: 0
Start: 2025-06-07 | End: 2025-06-11

## 2025-06-07 RX ORDER — ONDANSETRON HCL/PF 4 MG/2 ML
4 VIAL (ML) INJECTION ONCE
Refills: 0 | Status: COMPLETED | OUTPATIENT
Start: 2025-06-07 | End: 2025-06-07

## 2025-06-07 RX ORDER — CEPHALEXIN 250 MG/1
1 CAPSULE ORAL
Qty: 22 | Refills: 0
Start: 2025-06-07 | End: 2025-06-13

## 2025-06-07 RX ORDER — ONDANSETRON HCL/PF 4 MG/2 ML
1 VIAL (ML) INJECTION
Qty: 0 | Refills: 0 | DISCHARGE

## 2025-06-07 RX ORDER — OXYCODONE HYDROCHLORIDE 30 MG/1
5 TABLET ORAL EVERY 6 HOURS
Refills: 0 | Status: DISCONTINUED | OUTPATIENT
Start: 2025-06-07 | End: 2025-06-07

## 2025-06-07 RX ORDER — ACETAMINOPHEN 500 MG/5ML
650 LIQUID (ML) ORAL ONCE
Refills: 0 | Status: COMPLETED | OUTPATIENT
Start: 2025-06-07 | End: 2025-06-07

## 2025-06-07 RX ORDER — NALOXONE HYDROCHLORIDE 0.4 MG/ML
1 INJECTION, SOLUTION INTRAMUSCULAR; INTRAVENOUS; SUBCUTANEOUS
Qty: 1 | Refills: 0
Start: 2025-06-07 | End: 2025-06-13

## 2025-06-07 RX ADMIN — Medication 400 MILLIGRAM(S): at 02:19

## 2025-06-07 RX ADMIN — Medication 0.5 MILLIGRAM(S): at 10:15

## 2025-06-07 RX ADMIN — Medication 0.5 MILLIGRAM(S): at 06:05

## 2025-06-07 RX ADMIN — Medication 750 MILLIGRAM(S): at 02:50

## 2025-06-07 RX ADMIN — Medication 100 MILLIGRAM(S): at 06:01

## 2025-06-07 RX ADMIN — Medication 0.5 MILLIGRAM(S): at 06:30

## 2025-06-07 RX ADMIN — Medication 4 MILLIGRAM(S): at 11:39

## 2025-06-07 RX ADMIN — Medication 650 MILLIGRAM(S): at 15:19

## 2025-06-07 RX ADMIN — Medication 0.5 MILLIGRAM(S): at 09:54

## 2025-06-07 RX ADMIN — Medication 100 MILLIGRAM(S): at 13:22

## 2025-06-10 ENCOUNTER — TRANSCRIPTION ENCOUNTER (OUTPATIENT)
Age: 40
End: 2025-06-10

## 2025-06-11 NOTE — ED ADULT NURSE NOTE - PAIN RATING/NUMBER SCALE (0-10): ACTIVITY
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 Estevez ED&C 6/2014 Detail Level: Detailed Other (Free Text): Left anterior distal thigh- treated with Aldara 04/2017 [facility] Other (Free Text): Left anterior proximal upper arm- clear after biopsy 10/2018 Render Risk Assessment In Note?: no 6

## 2025-06-11 NOTE — ED PROVIDER NOTE - HEME LYMPH, MLM
Pt is unreachable for follow-up call. ED navigator plans to follow-up with patient again on/around 6/20/2025.    Franchesca Bates  ED Navigator  [phone]      1. Stop the eyeliner  2. Start prednisone today and take with food  3. Zyrtec 10mg daily (OTC) x 1 week  4. Use olopatadine eye drops as directed  5. Follow up if any worsening, persistent, or if new symptoms develop   No adenopathy or splenomegaly. No cervical or inguinal lymphadenopathy.

## 2025-06-16 ENCOUNTER — APPOINTMENT (OUTPATIENT)
Dept: PLASTIC SURGERY | Facility: CLINIC | Age: 40
End: 2025-06-16

## 2025-06-18 ENCOUNTER — TRANSCRIPTION ENCOUNTER (OUTPATIENT)
Age: 40
End: 2025-06-18

## 2025-06-26 ENCOUNTER — TRANSCRIPTION ENCOUNTER (OUTPATIENT)
Age: 40
End: 2025-06-26

## 2025-07-03 ENCOUNTER — APPOINTMENT (OUTPATIENT)
Dept: INTERNAL MEDICINE | Facility: CLINIC | Age: 40
End: 2025-07-03
Payer: COMMERCIAL

## 2025-07-03 VITALS
DIASTOLIC BLOOD PRESSURE: 72 MMHG | BODY MASS INDEX: 19.77 KG/M2 | SYSTOLIC BLOOD PRESSURE: 104 MMHG | OXYGEN SATURATION: 99 % | HEART RATE: 82 BPM | WEIGHT: 123 LBS | RESPIRATION RATE: 15 BRPM | TEMPERATURE: 98.1 F | HEIGHT: 66 IN

## 2025-07-03 PROBLEM — N64.89 HEMATOMA OF BREAST: Status: ACTIVE | Noted: 2025-07-03

## 2025-07-03 PROCEDURE — 36415 COLL VENOUS BLD VENIPUNCTURE: CPT

## 2025-07-03 PROCEDURE — 99213 OFFICE O/P EST LOW 20 MIN: CPT

## 2025-07-03 NOTE — PHYSICAL EXAM
[Normal] : normal rate, regular rhythm, normal S1 and S2 and no murmur heard [Pedal Pulses Present] : the pedal pulses are present [No Edema] : there was no peripheral edema [Soft] : abdomen soft [Non Tender] : non-tender [Non-distended] : non-distended [Normal Posterior Cervical Nodes] : no posterior cervical lymphadenopathy [Normal Anterior Cervical Nodes] : no anterior cervical lymphadenopathy [No CVA Tenderness] : no CVA  tenderness [No Spinal Tenderness] : no spinal tenderness [No Joint Swelling] : no joint swelling [Grossly Normal Strength/Tone] : grossly normal strength/tone [No Rash] : no rash [Coordination Grossly Intact] : coordination grossly intact [No Focal Deficits] : no focal deficits [Normal Gait] : normal gait [Normal Affect] : the affect was normal [Normal Insight/Judgement] : insight and judgment were intact [Normal Appearance] : normal in appearance [de-identified] : no redness, wound is well approximated

## 2025-07-03 NOTE — HISTORY OF PRESENT ILLNESS
[FreeTextEntry2] : 39F seen for follow up post hospital discharge. She noted left breast pain and swelling s/p breast augmentation 8 days post op. Initial surgery was in [place]. States she was seen in the ER and admitted for breast hematoma. She was taken to the OR for washout/implant exchange and to control bleeding. She was given IV antibiotics and and took outpatient antibiotics. Received 1 unit of blood while in the hospital. She has been seeing plastics every monday. Denies fever, chills, rashes, redness to breast, drainage, swelling to the breast. States she does still have pain to the left breast. Has sharp pain at times as well. Uses ice and tylenol. Seeing plastics again on Monday. Denies sternal chest pain, shortness of breath. A&Ox3.

## 2025-07-03 NOTE — REVIEW OF SYSTEMS
[Fatigue] : fatigue [Negative] : Heme/Lymph [Fever] : no fever [Chills] : no chills [Hot Flashes] : no hot flashes [Night Sweats] : no night sweats [Recent Change In Weight] : ~T no recent weight change [Joint Pain] : no joint pain [Joint Stiffness] : no joint stiffness [Joint Swelling] : no joint swelling [Muscle Weakness] : no muscle weakness [Muscle Pain] : no muscle pain [Back Pain] : no back pain [FreeTextEntry9] : left breast pain

## 2025-07-04 ENCOUNTER — TRANSCRIPTION ENCOUNTER (OUTPATIENT)
Age: 40
End: 2025-07-04

## 2025-07-04 LAB
ALBUMIN SERPL ELPH-MCNC: 4.6 G/DL
ALP BLD-CCNC: 84 U/L
ALT SERPL-CCNC: 17 U/L
ANION GAP SERPL CALC-SCNC: 14 MMOL/L
AST SERPL-CCNC: 21 U/L
BASOPHILS # BLD AUTO: 0.04 K/UL
BASOPHILS NFR BLD AUTO: 0.7 %
BILIRUB SERPL-MCNC: 0.2 MG/DL
BUN SERPL-MCNC: 14 MG/DL
CALCIUM SERPL-MCNC: 9.7 MG/DL
CHLORIDE SERPL-SCNC: 103 MMOL/L
CO2 SERPL-SCNC: 20 MMOL/L
CREAT SERPL-MCNC: 0.51 MG/DL
EGFRCR SERPLBLD CKD-EPI 2021: 122 ML/MIN/1.73M2
EOSINOPHIL # BLD AUTO: 0.11 K/UL
EOSINOPHIL NFR BLD AUTO: 2 %
FERRITIN SERPL-MCNC: 24 NG/ML
GLUCOSE SERPL-MCNC: 86 MG/DL
HCT VFR BLD CALC: 37.9 %
HGB BLD-MCNC: 11.9 G/DL
IMM GRANULOCYTES NFR BLD AUTO: 0.4 %
IRON SATN MFR SERPL: 12 %
IRON SERPL-MCNC: 40 UG/DL
LYMPHOCYTES # BLD AUTO: 2.12 K/UL
LYMPHOCYTES NFR BLD AUTO: 38.1 %
MAN DIFF?: NORMAL
MCHC RBC-ENTMCNC: 27.7 PG
MCHC RBC-ENTMCNC: 31.4 G/DL
MCV RBC AUTO: 88.3 FL
MONOCYTES # BLD AUTO: 0.41 K/UL
MONOCYTES NFR BLD AUTO: 7.4 %
NEUTROPHILS # BLD AUTO: 2.87 K/UL
NEUTROPHILS NFR BLD AUTO: 51.4 %
PLATELET # BLD AUTO: 405 K/UL
POTASSIUM SERPL-SCNC: 4.6 MMOL/L
PROT SERPL-MCNC: 7.5 G/DL
RBC # BLD: 4.29 M/UL
RBC # FLD: 13.8 %
SODIUM SERPL-SCNC: 138 MMOL/L
TIBC SERPL-MCNC: 334 UG/DL
UIBC SERPL-MCNC: 294 UG/DL
WBC # FLD AUTO: 5.57 K/UL

## 2025-07-28 NOTE — ED ADULT TRIAGE NOTE - NS ED NURSE BANDS TYPE
No care due was identified.  [facility] Embedded Care Due Messages. Reference number: [id number].   7/28/2025 9:21:02 AM CDT   Name band;

## 2025-07-31 NOTE — ED ADULT NURSE NOTE - PLAN OF CARE
Pt has a hx of lupus has been sick all week, fevert, headache, back pain sob. Pt denies any urine compalints   
Pt is also having chest pain  
Side rails/Call bell/Explanation of exam/test

## 2025-08-01 ENCOUNTER — TRANSCRIPTION ENCOUNTER (OUTPATIENT)
Age: 40
End: 2025-08-01

## 2025-08-04 ENCOUNTER — TRANSCRIPTION ENCOUNTER (OUTPATIENT)
Age: 40
End: 2025-08-04

## 2025-08-19 ENCOUNTER — NON-APPOINTMENT (OUTPATIENT)
Age: 40
End: 2025-08-19

## (undated) DEVICE — STAPLER SKIN PROXIMATE

## (undated) DEVICE — Device

## (undated) DEVICE — SUT MONOCRYL 3-0 27" PS-2 UNDYED

## (undated) DEVICE — BLADE SCALPEL SAFETYLOCK #15

## (undated) DEVICE — SOL IRR POUR H2O 250ML

## (undated) DEVICE — DRAPE 3/4 SHEET 52X76"

## (undated) DEVICE — VENODYNE/SCD SLEEVE CALF MEDIUM

## (undated) DEVICE — ELCTR GROUNDING PAD ADULT COVIDIEN

## (undated) DEVICE — PACK MINOR WITH LAP

## (undated) DEVICE — SUT PDS II 2-0 27" CT-1

## (undated) DEVICE — DRSG XEROFORM 5 X 9"

## (undated) DEVICE — WARMING BLANKET FULL ADULT

## (undated) DEVICE — BLADE SURGICAL #15 CARBON

## (undated) DEVICE — BLADE SCALPEL SAFETYLOCK #10

## (undated) DEVICE — DRAPE 1/2 SHEET 40X57"

## (undated) DEVICE — SUT VICRYL PLUS 4-0 18" PS-2 UNDYED

## (undated) DEVICE — ZIMMER PULSAVAC PLUS FAN KIT

## (undated) DEVICE — BLADE SURGICAL #10 CARBON

## (undated) DEVICE — SOL IRR POUR NS 0.9% 500ML

## (undated) DEVICE — DRAIN RESERVOIR FOR JACKSON PRATT 100CC CARDINAL

## (undated) DEVICE — WARMING BLANKET LOWER ADULT